# Patient Record
Sex: FEMALE | Race: WHITE | Employment: OTHER
[De-identification: names, ages, dates, MRNs, and addresses within clinical notes are randomized per-mention and may not be internally consistent; named-entity substitution may affect disease eponyms.]

---

## 2017-01-23 ENCOUNTER — OFFICE VISIT (OUTPATIENT)
Dept: FAMILY MEDICINE CLINIC | Facility: CLINIC | Age: 67
End: 2017-01-23

## 2017-01-23 VITALS
HEIGHT: 63 IN | BODY MASS INDEX: 26.4 KG/M2 | WEIGHT: 149 LBS | HEART RATE: 101 BPM | DIASTOLIC BLOOD PRESSURE: 73 MMHG | SYSTOLIC BLOOD PRESSURE: 114 MMHG | RESPIRATION RATE: 16 BRPM | TEMPERATURE: 97.8 F

## 2017-01-23 DIAGNOSIS — N39.0 RECURRENT UTI: ICD-10-CM

## 2017-01-23 DIAGNOSIS — C34.91 MALIGNANT NEOPLASM OF RIGHT LUNG, UNSPECIFIED PART OF LUNG (HCC): ICD-10-CM

## 2017-01-23 DIAGNOSIS — E03.9 HYPOTHYROIDISM, UNSPECIFIED TYPE: ICD-10-CM

## 2017-01-23 DIAGNOSIS — J42 CHRONIC BRONCHITIS, UNSPECIFIED CHRONIC BRONCHITIS TYPE (HCC): Primary | ICD-10-CM

## 2017-01-23 DIAGNOSIS — E78.00 HIGH CHOLESTEROL: ICD-10-CM

## 2017-01-23 PROCEDURE — 99214 OFFICE O/P EST MOD 30 MIN: CPT | Performed by: FAMILY MEDICINE

## 2017-01-23 RX ORDER — LEVOTHYROXINE SODIUM 0.1 MG/1
TABLET ORAL
COMMUNITY
Start: 2016-12-30 | End: 2018-01-30 | Stop reason: SDUPTHER

## 2017-03-07 ENCOUNTER — OFFICE VISIT (OUTPATIENT)
Dept: FAMILY MEDICINE CLINIC | Facility: CLINIC | Age: 67
End: 2017-03-07

## 2017-03-07 VITALS
DIASTOLIC BLOOD PRESSURE: 70 MMHG | WEIGHT: 146 LBS | RESPIRATION RATE: 16 BRPM | HEART RATE: 103 BPM | OXYGEN SATURATION: 98 % | BODY MASS INDEX: 25.86 KG/M2 | SYSTOLIC BLOOD PRESSURE: 96 MMHG | TEMPERATURE: 97.8 F

## 2017-03-07 DIAGNOSIS — J40 BRONCHITIS: Primary | ICD-10-CM

## 2017-03-07 PROCEDURE — 99213 OFFICE O/P EST LOW 20 MIN: CPT | Performed by: NURSE PRACTITIONER

## 2017-03-07 RX ORDER — AZITHROMYCIN 250 MG/1
TABLET, FILM COATED ORAL
COMMUNITY
Start: 2017-03-06 | End: 2017-11-07 | Stop reason: ALTCHOICE

## 2017-03-07 RX ORDER — METHYLPREDNISOLONE 4 MG/1
TABLET ORAL
COMMUNITY
Start: 2017-03-06 | End: 2020-07-27 | Stop reason: ALTCHOICE

## 2017-03-07 ASSESSMENT — ENCOUNTER SYMPTOMS
SINUS PRESSURE: 0
HEMOPTYSIS: 0
COUGH: 1
SHORTNESS OF BREATH: 0

## 2017-03-14 ENCOUNTER — HOSPITAL ENCOUNTER (OUTPATIENT)
Age: 67
Discharge: HOME | End: 2017-03-14
Payer: COMMERCIAL

## 2017-03-14 ENCOUNTER — OFFICE VISIT (OUTPATIENT)
Dept: FAMILY MEDICINE CLINIC | Age: 67
End: 2017-03-14
Payer: COMMERCIAL

## 2017-03-14 ENCOUNTER — HOSPITAL ENCOUNTER (OUTPATIENT)
Dept: GENERAL RADIOLOGY | Age: 67
Discharge: HOME | End: 2017-03-14
Payer: COMMERCIAL

## 2017-03-14 VITALS
SYSTOLIC BLOOD PRESSURE: 132 MMHG | RESPIRATION RATE: 14 BRPM | HEIGHT: 63 IN | TEMPERATURE: 98.2 F | WEIGHT: 145.94 LBS | BODY MASS INDEX: 25.86 KG/M2 | DIASTOLIC BLOOD PRESSURE: 85 MMHG | HEART RATE: 86 BPM

## 2017-03-14 DIAGNOSIS — J42 CHRONIC BRONCHITIS, UNSPECIFIED CHRONIC BRONCHITIS TYPE (HCC): ICD-10-CM

## 2017-03-14 DIAGNOSIS — R05.9 COUGH: ICD-10-CM

## 2017-03-14 DIAGNOSIS — Z13.820 SCREENING FOR OSTEOPOROSIS: ICD-10-CM

## 2017-03-14 DIAGNOSIS — Z13.220 SCREENING CHOLESTEROL LEVEL: ICD-10-CM

## 2017-03-14 DIAGNOSIS — Z23 NEED FOR STREPTOCOCCUS PNEUMONIAE VACCINATION: ICD-10-CM

## 2017-03-14 DIAGNOSIS — C34.91 MALIGNANT NEOPLASM OF RIGHT LUNG, UNSPECIFIED PART OF LUNG (HCC): ICD-10-CM

## 2017-03-14 DIAGNOSIS — Z12.39 SCREENING FOR BREAST CANCER: ICD-10-CM

## 2017-03-14 DIAGNOSIS — J40 BRONCHITIS: Primary | ICD-10-CM

## 2017-03-14 PROCEDURE — 71020 XR CHEST STANDARD TWO VW: CPT

## 2017-03-14 PROCEDURE — 90471 IMMUNIZATION ADMIN: CPT | Performed by: NURSE PRACTITIONER

## 2017-03-14 PROCEDURE — 90670 PCV13 VACCINE IM: CPT | Performed by: NURSE PRACTITIONER

## 2017-03-14 PROCEDURE — 99213 OFFICE O/P EST LOW 20 MIN: CPT | Performed by: NURSE PRACTITIONER

## 2017-03-14 RX ORDER — BENZONATATE 200 MG/1
200 CAPSULE ORAL 3 TIMES DAILY PRN
Qty: 30 CAPSULE | Refills: 0 | Status: SHIPPED | OUTPATIENT
Start: 2017-03-14 | End: 2017-03-24

## 2017-03-14 ASSESSMENT — ENCOUNTER SYMPTOMS
RHINORRHEA: 0
WHEEZING: 1
VOMITING: 0
SHORTNESS OF BREATH: 1
COUGH: 1
SORE THROAT: 0
SINUS PRESSURE: 0
TROUBLE SWALLOWING: 0
NAUSEA: 0
HEMOPTYSIS: 0

## 2017-03-28 ENCOUNTER — HOSPITAL ENCOUNTER (OUTPATIENT)
Dept: MAMMOGRAPHY | Age: 67
Discharge: HOME | End: 2017-03-28
Payer: COMMERCIAL

## 2017-03-28 ENCOUNTER — HOSPITAL ENCOUNTER (OUTPATIENT)
Dept: BONE DENSITY | Age: 67
Discharge: HOME | End: 2017-03-28
Payer: COMMERCIAL

## 2017-03-28 DIAGNOSIS — Z12.39 SCREENING FOR BREAST CANCER: ICD-10-CM

## 2017-03-28 DIAGNOSIS — Z13.820 SCREENING FOR OSTEOPOROSIS: ICD-10-CM

## 2017-03-28 PROBLEM — M85.80 OSTEOPENIA: Status: ACTIVE | Noted: 2017-03-28

## 2017-03-28 PROCEDURE — 77080 DXA BONE DENSITY AXIAL: CPT

## 2017-03-28 PROCEDURE — G0202 SCR MAMMO BI INCL CAD: HCPCS

## 2017-04-24 ENCOUNTER — OFFICE VISIT (OUTPATIENT)
Dept: PULMONOLOGY | Age: 67
End: 2017-04-24
Payer: COMMERCIAL

## 2017-04-24 VITALS
HEART RATE: 98 BPM | OXYGEN SATURATION: 94 % | DIASTOLIC BLOOD PRESSURE: 62 MMHG | BODY MASS INDEX: 27.23 KG/M2 | SYSTOLIC BLOOD PRESSURE: 110 MMHG | HEIGHT: 62 IN | RESPIRATION RATE: 16 BRPM | TEMPERATURE: 97.2 F | WEIGHT: 148 LBS

## 2017-04-24 DIAGNOSIS — Z90.2 S/P PNEUMONECTOMY: ICD-10-CM

## 2017-04-24 DIAGNOSIS — J44.9 COPD, SEVERITY TO BE DETERMINED (HCC): Primary | ICD-10-CM

## 2017-04-24 PROCEDURE — 99204 OFFICE O/P NEW MOD 45 MIN: CPT | Performed by: INTERNAL MEDICINE

## 2017-05-16 ENCOUNTER — HOSPITAL ENCOUNTER (OUTPATIENT)
Dept: PULMONOLOGY | Age: 67
Discharge: HOME | End: 2017-05-16
Payer: COMMERCIAL

## 2017-05-16 PROCEDURE — 94060 EVALUATION OF WHEEZING: CPT

## 2017-05-16 PROCEDURE — 94727 GAS DIL/WSHOT DETER LNG VOL: CPT

## 2017-05-16 PROCEDURE — 6360000002 HC RX W HCPCS: Performed by: INTERNAL MEDICINE

## 2017-05-16 PROCEDURE — 94726 PLETHYSMOGRAPHY LUNG VOLUMES: CPT

## 2017-05-16 PROCEDURE — 94729 DIFFUSING CAPACITY: CPT

## 2017-05-16 PROCEDURE — 94728 AIRWY RESIST BY OSCILLOMETRY: CPT

## 2017-05-16 RX ORDER — ALBUTEROL SULFATE 2.5 MG/3ML
2.5 SOLUTION RESPIRATORY (INHALATION) ONCE
Status: COMPLETED | OUTPATIENT
Start: 2017-05-16 | End: 2017-05-16

## 2017-05-16 RX ADMIN — ALBUTEROL SULFATE 2.5 MG: 2.5 SOLUTION RESPIRATORY (INHALATION) at 15:56

## 2017-11-07 ENCOUNTER — OFFICE VISIT (OUTPATIENT)
Dept: FAMILY MEDICINE CLINIC | Age: 67
End: 2017-11-07
Payer: COMMERCIAL

## 2017-11-07 VITALS
TEMPERATURE: 98.1 F | DIASTOLIC BLOOD PRESSURE: 70 MMHG | WEIGHT: 148 LBS | HEART RATE: 98 BPM | BODY MASS INDEX: 27.23 KG/M2 | RESPIRATION RATE: 16 BRPM | HEIGHT: 62 IN | SYSTOLIC BLOOD PRESSURE: 120 MMHG

## 2017-11-07 DIAGNOSIS — J42 CHRONIC BRONCHITIS, UNSPECIFIED CHRONIC BRONCHITIS TYPE (HCC): ICD-10-CM

## 2017-11-07 DIAGNOSIS — J02.9 SORE THROAT: ICD-10-CM

## 2017-11-07 DIAGNOSIS — J40 BRONCHITIS: Primary | ICD-10-CM

## 2017-11-07 PROCEDURE — 3017F COLORECTAL CA SCREEN DOC REV: CPT | Performed by: NURSE PRACTITIONER

## 2017-11-07 PROCEDURE — 1090F PRES/ABSN URINE INCON ASSESS: CPT | Performed by: NURSE PRACTITIONER

## 2017-11-07 PROCEDURE — 87880 STREP A ASSAY W/OPTIC: CPT | Performed by: NURSE PRACTITIONER

## 2017-11-07 PROCEDURE — 4040F PNEUMOC VAC/ADMIN/RCVD: CPT | Performed by: NURSE PRACTITIONER

## 2017-11-07 PROCEDURE — 1036F TOBACCO NON-USER: CPT | Performed by: NURSE PRACTITIONER

## 2017-11-07 PROCEDURE — G8399 PT W/DXA RESULTS DOCUMENT: HCPCS | Performed by: NURSE PRACTITIONER

## 2017-11-07 PROCEDURE — G8482 FLU IMMUNIZE ORDER/ADMIN: HCPCS | Performed by: NURSE PRACTITIONER

## 2017-11-07 PROCEDURE — 99213 OFFICE O/P EST LOW 20 MIN: CPT | Performed by: NURSE PRACTITIONER

## 2017-11-07 PROCEDURE — G8427 DOCREV CUR MEDS BY ELIG CLIN: HCPCS | Performed by: NURSE PRACTITIONER

## 2017-11-07 PROCEDURE — G8926 SPIRO NO PERF OR DOC: HCPCS | Performed by: NURSE PRACTITIONER

## 2017-11-07 PROCEDURE — 1123F ACP DISCUSS/DSCN MKR DOCD: CPT | Performed by: NURSE PRACTITIONER

## 2017-11-07 PROCEDURE — G8419 CALC BMI OUT NRM PARAM NOF/U: HCPCS | Performed by: NURSE PRACTITIONER

## 2017-11-07 PROCEDURE — 3023F SPIROM DOC REV: CPT | Performed by: NURSE PRACTITIONER

## 2017-11-07 PROCEDURE — 3014F SCREEN MAMMO DOC REV: CPT | Performed by: NURSE PRACTITIONER

## 2017-11-07 RX ORDER — AZITHROMYCIN 250 MG/1
TABLET, FILM COATED ORAL
Qty: 6 TABLET | Refills: 0 | Status: SHIPPED | OUTPATIENT
Start: 2017-11-07 | End: 2017-11-17

## 2017-11-07 ASSESSMENT — ENCOUNTER SYMPTOMS
ABDOMINAL PAIN: 0
SHORTNESS OF BREATH: 1
WHEEZING: 1
SINUS PRESSURE: 0
HEMOPTYSIS: 0
SINUS PAIN: 0
RHINORRHEA: 1
VOMITING: 0
NAUSEA: 0
SORE THROAT: 1
COUGH: 1

## 2017-11-07 NOTE — PATIENT INSTRUCTIONS
liquid (oral suspension) on an empty stomach, at least 1 hour before or 2 hours after a meal.  To use the oral suspension single dose packet: Open the packet and pour the medicine into 2 ounces of water. Stir this mixture and drink all of it right away. Do not save for later use. To make sure you get the entire dose, add a little more water to the same glass, swirl gently and drink right away. Throw away any mixed Zmax oral suspension that has not been used within 12 hours. Shake the oral suspension (liquid) well just before you measure a dose. Measure liquid medicine with the dosing syringe provided, or with a special dose-measuring spoon or medicine cup. If you do not have a dose-measuring device, ask your pharmacist for one. Use this medicine for the full prescribed length of time. Your symptoms may improve before the infection is completely cleared. Skipping doses may also increase your risk of further infection that is resistant to antibiotics. Azithromycin will not treat a viral infection such as the flu or a common cold. Store at room temperature away from moisture and heat. Throw away any unused liquid medicine after 10 days. What happens if I miss a dose? Take the missed dose as soon as you remember. Skip the missed dose if it is almost time for your next scheduled dose. Do not  take extra medicine to make up the missed dose. What happens if I overdose? Seek emergency medical attention or call the Poison Help line at 1-661.149.3228. What should I avoid while taking azithromycin? Do not take antacids that contain aluminum or magnesium within 2 hours before or after you take azithromycin. This includes Acid Gone, Aldroxicon, Alternagel, Di-Gel, Gaviscon, Gelusil, Genaton, Maalox, Maldroxal, Milk of Magnesia, Mintox, Mylagen, Mylanta, Pepcid Complete, Rolaids, Rulox, and others. These antacids can make azithromycin less effective when taken at the same time.   Antibiotic medicines can cause diarrhea, effects. If you have questions about the drugs you are taking, check with your doctor, nurse or pharmacist.  Copyright 0965-8652 [facility] [place]. Version: 17.04. Revision date: 7/5/2016. Care instructions adapted under license by [facility] ([facility]). If you have questions about a medical condition or this instruction, always ask your healthcare professional. [address] any warranty or liability for your use of this information.

## 2017-11-07 NOTE — PROGRESS NOTES
[address] [address]  Dept: [phone]  Dept Fax: [phone]    Nette Vilchis is a 78 y.o. female who presents today for her medical conditions/complaints as noted below. Nette Vilchis is c/o of URI and Pharyngitis        HPI:     Cough   This is a new problem. The current episode started in the past 7 days (5 days). The problem has been gradually worsening. The problem occurs every few minutes. The cough is productive of sputum. Associated symptoms include chills, rhinorrhea, a sore throat, shortness of breath (intermittent with exertion) and wheezing. Pertinent negatives include no chest pain, ear pain, fever, hemoptysis, nasal congestion, postnasal drip, rash or weight loss. The symptoms are aggravated by exercise. Risk factors for lung disease include smoking/tobacco exposure (former smoker). She has tried a beta-agonist inhaler for the symptoms. The treatment provided moderate relief. Her past medical history is significant for bronchitis and COPD. Lung CA       Past Medical History:   Diagnosis Date    Asthma     COPD (chronic obstructive pulmonary disease) ([facility] [address]     Hypothyroid     Seizures (HCC)       Past Surgical History:   Procedure Laterality Date    BREAST CYST EXCISION Bilateral     fibiritic    CHOLECYSTECTOMY      LUNG REMOVAL, TOTAL Right     OVARIAN CYST REMOVAL Bilateral     TONSILLECTOMY      TUBAL LIGATION         History reviewed. No pertinent family history. Social History   Substance Use Topics    Smoking status: Former Smoker     Packs/day: 0.50     Years: 23.00     Types: Cigarettes     Quit date: 10/7/1995    Smokeless tobacco: Never Used    Alcohol use No      Current Outpatient Prescriptions   Medication Sig Dispense Refill    azithromycin (ZITHROMAX) 250 MG tablet 2 tablets by mouth on day one.   Then, 1 tablet by mouth daily for days 2-5. 6 tablet 0    methylPREDNISolone (MEDROL DOSEPACK) 4 MG tablet       levothyroxine (SYNTHROID) 100 MCG tablet       SPIRIVA HANDIHALER 18 MCG inhalation capsule INHALE ONE CAPSULE BY MOUTH DAILY 30 capsule 3    albuterol sulfate HFA (PROAIR HFA) 108 (90 BASE) MCG/ACT inhaler Inhale 2 puffs into the lungs every 6 hours as needed for Wheezing 1 Inhaler 3    Cranberry 125 MG TABS Take by mouth      albuterol (PROVENTIL) (2.5 MG/3ML) 0.083% nebulizer solution Take 3 mLs by nebulization every 6 hours as needed for Wheezing. 120 each 3     Current Facility-Administered Medications   Medication Dose Route Frequency Provider Last Rate Last Dose    albuterol (ACCUNEB) nebulizer solution 0.63 mg  0.63 mg Nebulization Once Leonor Dawn MD        methylPREDNISolone acetate (DEPO-MEDROL) injection 80 mg  80 mg Intramuscular Once Leonor Dawn MD        methylPREDNISolone acetate (DEPO-MEDROL) injection 40 mg  40 mg Intramuscular Once Leonor Dawn MD         Allergies   Allergen Reactions    Benadryl [Diphenhydramine]     Codeine     Dye [Iodides]     Lorazepam     Prochlorperazine Edisylate     Sulfa Antibiotics     Zofran [Ondansetron Hcl] Nausea And Vomiting       Health Maintenance   Topic Date Due    Hepatitis C screen  1950    DTaP/Tdap/Td vaccine (1 - Tdap) 05/16/1970    Colon cancer screen colonoscopy  05/16/2001    Zostavax vaccine  05/16/2011    Diabetes screen  01/24/2015    Lipid screen  01/24/2017    Pneumococcal low/med risk (2 of 2 - PPSV23) 03/14/2018    Breast cancer screen  03/28/2019    DEXA (modify frequency per FRAX score)  Completed    Flu vaccine  Completed       Subjective:      Review of Systems   Constitutional: Positive for chills. Negative for fatigue, fever, unexpected weight change and weight loss. HENT: Positive for rhinorrhea and sore throat. Negative for congestion, ear pain, postnasal drip, sinus pain and sinus pressure.     Respiratory: Positive for cough, shortness of breath (intermittent with exertion) and wheezing. Negative for hemoptysis. Cardiovascular: Negative for chest pain, palpitations and leg swelling. Gastrointestinal: Negative for abdominal pain, nausea and vomiting. Musculoskeletal: Negative for neck stiffness. Skin: Negative for rash. Neurological: Negative for dizziness and syncope. Objective:     Physical Exam   Constitutional: She is oriented to person, place, and time. She appears well-developed and well-nourished. No distress. HENT:   Head: Normocephalic and atraumatic. Right Ear: Hearing, tympanic membrane, external ear and ear canal normal.   Left Ear: Hearing, tympanic membrane, external ear and ear canal normal.   Nose: Mucosal edema present. Right sinus exhibits no maxillary sinus tenderness and no frontal sinus tenderness. Left sinus exhibits no maxillary sinus tenderness and no frontal sinus tenderness. Mouth/Throat: Uvula is midline and mucous membranes are normal. No trismus in the jaw. Posterior oropharyngeal erythema (mild) present. No oropharyngeal exudate or posterior oropharyngeal edema. Eyes: EOM are normal. Pupils are equal, round, and reactive to light. Neck: Normal range of motion. Neck supple. Cardiovascular: Normal rate, regular rhythm and normal heart sounds. Exam reveals no gallop and no friction rub. No murmur heard. Pulmonary/Chest: Effort normal and breath sounds normal. No respiratory distress. She has no wheezes. She has no rales. Abdominal: There is no CVA tenderness. Musculoskeletal: Normal range of motion. Lymphadenopathy:     She has no cervical adenopathy. Neurological: She is alert and oriented to person, place, and time. No cranial nerve deficit. Skin: Skin is warm and dry. No rash noted. She is not diaphoretic. Psychiatric: She has a normal mood and affect. Her behavior is normal. Judgment and thought content normal.   Nursing note and vitals reviewed.     BP

## 2018-01-30 ENCOUNTER — OFFICE VISIT (OUTPATIENT)
Dept: FAMILY MEDICINE CLINIC | Age: 68
End: 2018-01-30
Payer: COMMERCIAL

## 2018-01-30 ENCOUNTER — HOSPITAL ENCOUNTER (OUTPATIENT)
Age: 68
Discharge: HOME | End: 2018-01-30
Payer: MEDICARE

## 2018-01-30 ENCOUNTER — HOSPITAL ENCOUNTER (OUTPATIENT)
Dept: GENERAL RADIOLOGY | Age: 68
Discharge: HOME | End: 2018-02-01
Payer: MEDICARE

## 2018-01-30 ENCOUNTER — HOSPITAL ENCOUNTER (OUTPATIENT)
Age: 68
Discharge: HOME | End: 2018-02-01
Payer: MEDICARE

## 2018-01-30 ENCOUNTER — TELEPHONE (OUTPATIENT)
Dept: FAMILY MEDICINE CLINIC | Age: 68
End: 2018-01-30

## 2018-01-30 VITALS
RESPIRATION RATE: 16 BRPM | TEMPERATURE: 97.6 F | BODY MASS INDEX: 27.56 KG/M2 | DIASTOLIC BLOOD PRESSURE: 71 MMHG | SYSTOLIC BLOOD PRESSURE: 117 MMHG | HEART RATE: 95 BPM | WEIGHT: 150.7 LBS

## 2018-01-30 DIAGNOSIS — J40 BRONCHITIS: ICD-10-CM

## 2018-01-30 DIAGNOSIS — J40 BRONCHITIS: Primary | ICD-10-CM

## 2018-01-30 DIAGNOSIS — R91.1 LUNG NODULE: ICD-10-CM

## 2018-01-30 LAB
HCT VFR BLD CALC: 47.6 % (ref 36.3–47.1)
HEMOGLOBIN: 14.6 G/DL (ref 11.9–15.1)
MCH RBC QN AUTO: 28 PG (ref 25.2–33.5)
MCHC RBC AUTO-ENTMCNC: 30.7 G/DL (ref 28.4–34.8)
MCV RBC AUTO: 91.2 FL (ref 82.6–102.9)
NRBC AUTOMATED: 0 PER 100 WBC
PDW BLD-RTO: 13.2 % (ref 11.8–14.4)
PLATELET # BLD: 245 K/UL (ref 138–453)
PMV BLD AUTO: 12.1 FL (ref 8.1–13.5)
RBC # BLD: 5.22 M/UL (ref 3.95–5.11)
WBC # BLD: 11 K/UL (ref 3.5–11.3)

## 2018-01-30 PROCEDURE — G8419 CALC BMI OUT NRM PARAM NOF/U: HCPCS | Performed by: FAMILY MEDICINE

## 2018-01-30 PROCEDURE — 1036F TOBACCO NON-USER: CPT | Performed by: FAMILY MEDICINE

## 2018-01-30 PROCEDURE — G8427 DOCREV CUR MEDS BY ELIG CLIN: HCPCS | Performed by: FAMILY MEDICINE

## 2018-01-30 PROCEDURE — 4040F PNEUMOC VAC/ADMIN/RCVD: CPT | Performed by: FAMILY MEDICINE

## 2018-01-30 PROCEDURE — 1123F ACP DISCUSS/DSCN MKR DOCD: CPT | Performed by: FAMILY MEDICINE

## 2018-01-30 PROCEDURE — 71046 X-RAY EXAM CHEST 2 VIEWS: CPT

## 2018-01-30 PROCEDURE — 1090F PRES/ABSN URINE INCON ASSESS: CPT | Performed by: FAMILY MEDICINE

## 2018-01-30 PROCEDURE — 3014F SCREEN MAMMO DOC REV: CPT | Performed by: FAMILY MEDICINE

## 2018-01-30 PROCEDURE — G8399 PT W/DXA RESULTS DOCUMENT: HCPCS | Performed by: FAMILY MEDICINE

## 2018-01-30 PROCEDURE — 36415 COLL VENOUS BLD VENIPUNCTURE: CPT

## 2018-01-30 PROCEDURE — G8482 FLU IMMUNIZE ORDER/ADMIN: HCPCS | Performed by: FAMILY MEDICINE

## 2018-01-30 PROCEDURE — 99213 OFFICE O/P EST LOW 20 MIN: CPT | Performed by: FAMILY MEDICINE

## 2018-01-30 PROCEDURE — 3017F COLORECTAL CA SCREEN DOC REV: CPT | Performed by: FAMILY MEDICINE

## 2018-01-30 PROCEDURE — 85027 COMPLETE CBC AUTOMATED: CPT

## 2018-01-30 RX ORDER — LEVOTHYROXINE SODIUM 0.1 MG/1
100 TABLET ORAL DAILY
Qty: 30 TABLET | Refills: 3 | Status: SHIPPED | OUTPATIENT
Start: 2018-01-30 | End: 2019-04-23 | Stop reason: SDUPTHER

## 2018-01-30 NOTE — PROGRESS NOTES
Subjective: Laura is in for a sick call. She was recently seen and treated in urgent care and diagnosed with bronchitis. She is currently on a steroid, Z-Silverio, and Tessalon Perles. She feels somewhat better. She continues to complain of fatigue. She denies any muscle aches or fever or other symptoms suggestive of influenza. Objective:  /71 (Site: Right Arm, Position: Sitting, Cuff Size: Medium Adult)   Pulse 95   Temp 97.6 °F (36.4 °C) (Oral)   Resp 16   Wt 150 lb 11.2 oz (68.4 kg)   Breastfeeding? No   BMI 27.56 kg/m²   Constitutional: She is oriented to person, place, and time. She appears well-developed and well-nourished and in no acute distress. Head: Normocephalic and atraumatic. Right Ear: External ear normal.  TM is normal in appearance. Left Ear: External ear normal.  TM is normal in appearance. Nose: pink, non-edematous mucosa. Sinuses: no sinus tenderness noted  Throat: no erythema, tonsillar hypertrophy or exudate  Neck: Normal range of motion. Neck supple. No tracheal deviation present. Pulmonary/Chest: Effort normal and breath sounds normal.  Diffuse wheezes and scattered crackles noted. No chest retraction. Assessment:  1. Bronchitis  XR CHEST STANDARD (2 VW)    CBC         Plan:  Medications, laboratory testing, imaging, consultation, and follow up as documented in this encounter. She seems to be improving. I recommended continuation of current treatment. Check studies as indicated above. Further evaluation and management will be dependent upon test results.

## 2018-01-30 NOTE — TELEPHONE ENCOUNTER
I called the patient and advised her of her chest x-ray results. There is no evidence of pneumonia. The x-ray is suggestive of bronchitis. The radiologist did comment on a 9 mm nodule and recommended a non-emergent CT scan. The patient does have a allergy to contrast dye. As such, I will order a non-contrasted CT scan of the chest for evaluation of lung nodule. The patient voices understanding and is expecting the order. Please mail the order to the patient's home and close the encounter.

## 2018-02-06 ENCOUNTER — TELEPHONE (OUTPATIENT)
Dept: FAMILY MEDICINE CLINIC | Age: 68
End: 2018-02-06

## 2018-02-08 ENCOUNTER — HOSPITAL ENCOUNTER (OUTPATIENT)
Dept: CT IMAGING | Age: 68
Discharge: HOME | End: 2018-02-10
Payer: MEDICARE

## 2018-02-08 ENCOUNTER — TELEPHONE (OUTPATIENT)
Dept: FAMILY MEDICINE CLINIC | Age: 68
End: 2018-02-08

## 2018-02-08 DIAGNOSIS — R91.1 LUNG NODULE: ICD-10-CM

## 2018-02-08 PROCEDURE — 71250 CT THORAX DX C-: CPT

## 2018-05-22 ENCOUNTER — OFFICE VISIT (OUTPATIENT)
Dept: PRIMARY CARE CLINIC | Age: 68
End: 2018-05-22
Payer: MEDICARE

## 2018-05-22 VITALS
RESPIRATION RATE: 16 BRPM | BODY MASS INDEX: 27.28 KG/M2 | OXYGEN SATURATION: 99 % | DIASTOLIC BLOOD PRESSURE: 78 MMHG | WEIGHT: 149.2 LBS | HEART RATE: 97 BPM | SYSTOLIC BLOOD PRESSURE: 112 MMHG

## 2018-05-22 DIAGNOSIS — M25.561 ACUTE PAIN OF RIGHT KNEE: Primary | ICD-10-CM

## 2018-05-22 PROCEDURE — G8427 DOCREV CUR MEDS BY ELIG CLIN: HCPCS | Performed by: FAMILY MEDICINE

## 2018-05-22 PROCEDURE — 4040F PNEUMOC VAC/ADMIN/RCVD: CPT | Performed by: FAMILY MEDICINE

## 2018-05-22 PROCEDURE — 1090F PRES/ABSN URINE INCON ASSESS: CPT | Performed by: FAMILY MEDICINE

## 2018-05-22 PROCEDURE — 3017F COLORECTAL CA SCREEN DOC REV: CPT | Performed by: FAMILY MEDICINE

## 2018-05-22 PROCEDURE — G8419 CALC BMI OUT NRM PARAM NOF/U: HCPCS | Performed by: FAMILY MEDICINE

## 2018-05-22 PROCEDURE — G8399 PT W/DXA RESULTS DOCUMENT: HCPCS | Performed by: FAMILY MEDICINE

## 2018-05-22 PROCEDURE — 96372 THER/PROPH/DIAG INJ SC/IM: CPT | Performed by: FAMILY MEDICINE

## 2018-05-22 PROCEDURE — 1036F TOBACCO NON-USER: CPT | Performed by: FAMILY MEDICINE

## 2018-05-22 PROCEDURE — 1123F ACP DISCUSS/DSCN MKR DOCD: CPT | Performed by: FAMILY MEDICINE

## 2018-05-22 PROCEDURE — 99213 OFFICE O/P EST LOW 20 MIN: CPT | Performed by: FAMILY MEDICINE

## 2018-05-22 RX ORDER — NAPROXEN 250 MG/1
250 TABLET ORAL 2 TIMES DAILY WITH MEALS
Qty: 60 TABLET | Refills: 1 | Status: SHIPPED | OUTPATIENT
Start: 2018-05-22 | End: 2018-07-09 | Stop reason: SDUPTHER

## 2018-05-22 RX ORDER — KETOROLAC TROMETHAMINE 30 MG/ML
60 INJECTION, SOLUTION INTRAMUSCULAR; INTRAVENOUS ONCE
Status: COMPLETED | OUTPATIENT
Start: 2018-05-22 | End: 2018-05-22

## 2018-05-22 RX ADMIN — KETOROLAC TROMETHAMINE 60 MG: 30 INJECTION, SOLUTION INTRAMUSCULAR; INTRAVENOUS at 11:25

## 2018-05-22 ASSESSMENT — PATIENT HEALTH QUESTIONNAIRE - PHQ9
SUM OF ALL RESPONSES TO PHQ9 QUESTIONS 1 & 2: 0
1. LITTLE INTEREST OR PLEASURE IN DOING THINGS: 0
2. FEELING DOWN, DEPRESSED OR HOPELESS: 0
SUM OF ALL RESPONSES TO PHQ QUESTIONS 1-9: 0

## 2018-05-29 ENCOUNTER — HOSPITAL ENCOUNTER (OUTPATIENT)
Dept: GENERAL RADIOLOGY | Age: 68
Discharge: HOME | End: 2018-05-31
Payer: MEDICARE

## 2018-05-29 ENCOUNTER — HOSPITAL ENCOUNTER (OUTPATIENT)
Age: 68
Discharge: HOME | End: 2018-05-31
Payer: MEDICARE

## 2018-05-29 DIAGNOSIS — M25.561 ACUTE PAIN OF RIGHT KNEE: ICD-10-CM

## 2018-05-29 PROCEDURE — 73562 X-RAY EXAM OF KNEE 3: CPT

## 2018-06-11 ENCOUNTER — TELEPHONE (OUTPATIENT)
Dept: PRIMARY CARE CLINIC | Age: 68
End: 2018-06-11

## 2018-07-09 DIAGNOSIS — M25.561 ACUTE PAIN OF RIGHT KNEE: ICD-10-CM

## 2018-07-09 RX ORDER — NAPROXEN 250 MG/1
250 TABLET ORAL 2 TIMES DAILY WITH MEALS
Qty: 60 TABLET | Refills: 1 | Status: SHIPPED | OUTPATIENT
Start: 2018-07-09 | End: 2019-06-20 | Stop reason: SINTOL

## 2018-07-18 ENCOUNTER — OFFICE VISIT (OUTPATIENT)
Dept: PRIMARY CARE CLINIC | Age: 68
End: 2018-07-18
Payer: MEDICARE

## 2018-07-18 VITALS
RESPIRATION RATE: 18 BRPM | BODY MASS INDEX: 27.36 KG/M2 | DIASTOLIC BLOOD PRESSURE: 74 MMHG | OXYGEN SATURATION: 99 % | WEIGHT: 149.6 LBS | HEART RATE: 67 BPM | SYSTOLIC BLOOD PRESSURE: 122 MMHG

## 2018-07-18 DIAGNOSIS — H66.001 ACUTE SUPPURATIVE OTITIS MEDIA OF RIGHT EAR WITHOUT SPONTANEOUS RUPTURE OF TYMPANIC MEMBRANE, RECURRENCE NOT SPECIFIED: Primary | ICD-10-CM

## 2018-07-18 DIAGNOSIS — R42 DIZZY: ICD-10-CM

## 2018-07-18 LAB
BILIRUBIN, POC: NORMAL
BLOOD URINE, POC: NEGATIVE
CLARITY, POC: NORMAL
COLOR, POC: NORMAL
GLUCOSE URINE, POC: NEGATIVE
KETONES, POC: NORMAL
LEUKOCYTE EST, POC: NORMAL
NITRITE, POC: NEGATIVE
PH, POC: 6
PROTEIN, POC: NORMAL
SPECIFIC GRAVITY, POC: 1.02
UROBILINOGEN, POC: 0.2

## 2018-07-18 PROCEDURE — 3017F COLORECTAL CA SCREEN DOC REV: CPT | Performed by: INTERNAL MEDICINE

## 2018-07-18 PROCEDURE — 4040F PNEUMOC VAC/ADMIN/RCVD: CPT | Performed by: INTERNAL MEDICINE

## 2018-07-18 PROCEDURE — 1036F TOBACCO NON-USER: CPT | Performed by: INTERNAL MEDICINE

## 2018-07-18 PROCEDURE — 81003 URINALYSIS AUTO W/O SCOPE: CPT | Performed by: INTERNAL MEDICINE

## 2018-07-18 PROCEDURE — 1123F ACP DISCUSS/DSCN MKR DOCD: CPT | Performed by: INTERNAL MEDICINE

## 2018-07-18 PROCEDURE — 1101F PT FALLS ASSESS-DOCD LE1/YR: CPT | Performed by: INTERNAL MEDICINE

## 2018-07-18 PROCEDURE — G8399 PT W/DXA RESULTS DOCUMENT: HCPCS | Performed by: INTERNAL MEDICINE

## 2018-07-18 PROCEDURE — G8419 CALC BMI OUT NRM PARAM NOF/U: HCPCS | Performed by: INTERNAL MEDICINE

## 2018-07-18 PROCEDURE — 1090F PRES/ABSN URINE INCON ASSESS: CPT | Performed by: INTERNAL MEDICINE

## 2018-07-18 PROCEDURE — 99214 OFFICE O/P EST MOD 30 MIN: CPT | Performed by: INTERNAL MEDICINE

## 2018-07-18 PROCEDURE — G8427 DOCREV CUR MEDS BY ELIG CLIN: HCPCS | Performed by: INTERNAL MEDICINE

## 2018-07-18 RX ORDER — MECLIZINE HCL 12.5 MG/1
12.5 TABLET ORAL 3 TIMES DAILY PRN
Qty: 30 TABLET | Refills: 2 | Status: SHIPPED | OUTPATIENT
Start: 2018-07-18 | End: 2018-07-28

## 2018-07-18 RX ORDER — AMOXICILLIN 500 MG/1
500 CAPSULE ORAL 2 TIMES DAILY
Qty: 20 CAPSULE | Refills: 0 | Status: SHIPPED | OUTPATIENT
Start: 2018-07-18 | End: 2018-07-28

## 2018-07-18 ASSESSMENT — ENCOUNTER SYMPTOMS
BACK PAIN: 0
COUGH: 1
SINUS PRESSURE: 1
EYE DISCHARGE: 0
TROUBLE SWALLOWING: 0
EYE REDNESS: 0
SORE THROAT: 0
NAUSEA: 0
ABDOMINAL PAIN: 0
SHORTNESS OF BREATH: 0
VOMITING: 0

## 2018-07-19 NOTE — PROGRESS NOTES
[facility]  [address] [place] [address] [address]  Rosalinda Brito [place] [address]  Dept: [phone]  Dept Fax: [phone]    Eleuterio Crouhc is a 80 y.o. female who presents today for her medical conditions/complaints as noted below. Eleuterio Crouch is c/o of   Chief Complaint   Patient presents with    Dizziness     patient states when she woke up today she was dizzy. Patient said that she tried to go home from work but she was not able to. HPI:     HPI    She had Hx of hearing impairment s/p hearing aids c/o dizziness since today morning and right ear plugged sensation associated with URI signs and symptoms - runny nose , sneezing , dry cough , sinus congestion. Denied fever/chills. Denied left ear symptoms. .    Hemoglobin A1C (%)   Date Value   01/24/2012 5.5             ( goal A1C is < 7)   No results found for: LABMICR  LDL Cholesterol (mg/dL)   Date Value   01/24/2012 150 (H)       (goal LDL is <100)   AST (U/L)   Date Value   01/24/2012 23     ALT (U/L)   Date Value   01/24/2012 26     BUN (mg/dL)   Date Value   04/17/2016 18     BP Readings from Last 3 Encounters:   07/18/18 122/74   05/22/18 112/78   01/30/18 117/71          (goal 120/80)    Past Medical History:   Diagnosis Date    Asthma     COPD (chronic obstructive pulmonary disease) (HCC)     Hypothyroid     Seizures (HCC)       Past Surgical History:   Procedure Laterality Date    BREAST CYST EXCISION Bilateral     fibiritic    CHOLECYSTECTOMY      LUNG REMOVAL, TOTAL Right     OVARIAN CYST REMOVAL Bilateral     TONSILLECTOMY      TUBAL LIGATION         History reviewed. No pertinent family history.     Social History   Substance Use Topics    Smoking status: Former Smoker     Packs/day: 0.50     Years: 23.00     Types: Cigarettes     Quit date: 10/7/1995    Smokeless tobacco: Never Used    Alcohol use No      Current Outpatient Prescriptions   Medication Sig Dispense Refill    amoxicillin (AMOXIL) 500 MG capsule Take 1 capsule by mouth 2 times daily for 10 days 20 capsule 0    meclizine (ANTIVERT) 12.5 MG tablet Take 1 tablet by mouth 3 times daily as needed for Dizziness 30 tablet 2    naproxen (NAPROSYN) 250 MG tablet Take 1 tablet by mouth 2 times daily (with meals) 60 tablet 1    levothyroxine (SYNTHROID) 100 MCG tablet Take 1 tablet by mouth daily 30 tablet 3    methylPREDNISolone (MEDROL DOSEPACK) 4 MG tablet       SPIRIVA HANDIHALER 18 MCG inhalation capsule INHALE ONE CAPSULE BY MOUTH DAILY 30 capsule 3    albuterol sulfate HFA (PROAIR HFA) 108 (90 BASE) MCG/ACT inhaler Inhale 2 puffs into the lungs every 6 hours as needed for Wheezing 1 Inhaler 3    Cranberry 125 MG TABS Take by mouth      albuterol (PROVENTIL) (2.5 MG/3ML) 0.083% nebulizer solution Take 3 mLs by nebulization every 6 hours as needed for Wheezing.  120 each 3     Current Facility-Administered Medications   Medication Dose Route Frequency Provider Last Rate Last Dose    albuterol (ACCUNEB) nebulizer solution 0.63 mg  0.63 mg Nebulization Once Rodrigo MD Annika        methylPREDNISolone acetate (DEPO-MEDROL) injection 80 mg  80 mg Intramuscular Once Rodrigo Roblero MD        methylPREDNISolone acetate (DEPO-MEDROL) injection 40 mg  40 mg Intramuscular Once Rodrigo MD Annika         Allergies   Allergen Reactions    Benadryl [Diphenhydramine]     Codeine     Dye [Iodides]     Lorazepam     Prochlorperazine Edisylate     Sulfa Antibiotics     Zofran [Ondansetron Hcl] Nausea And Vomiting       Health Maintenance   Topic Date Due    Hepatitis C screen  1950    DTaP/Tdap/Td vaccine (1 - Tdap) 05/16/1970    Shingles Vaccine (1 of 2 - 2 Dose Series) 05/16/2001    Colon cancer screen colonoscopy  05/16/2001    Diabetes screen  01/24/2015    Lipid screen  01/24/2017    Pneumococcal low/med risk (2 of 2 - PPSV23) 03/14/2018    Flu vaccine (1) 09/01/2018    Breast cancer Lymphadenopathy:     She has no cervical adenopathy. Neurological: She is alert and oriented to person, place, and time. Skin: Skin is warm and dry. No rash noted. She is not diaphoretic. Psychiatric: Judgment and thought content normal.   Nursing note and vitals reviewed. /74 (Site: Right Arm, Position: Sitting, Cuff Size: Medium Adult)   Pulse 67   Resp 18   Wt 149 lb 9.6 oz (67.9 kg)   SpO2 99%   BMI 27.36 kg/m²     Assessment:       Diagnosis Orders   1. Acute suppurative otitis media of right ear without spontaneous rupture of tympanic membrane, recurrence not specified  amoxicillin (AMOXIL) 500 MG capsule   2. Dizzy  POCT Urinalysis No Micro (Auto)    meclizine (ANTIVERT) 12.5 MG tablet             Plan:      Return if symptoms worsen or fail to improve. Orders Placed This Encounter   Procedures    POCT Urinalysis No Micro (Auto)     Orders Placed This Encounter   Medications    amoxicillin (AMOXIL) 500 MG capsule     Sig: Take 1 capsule by mouth 2 times daily for 10 days     Dispense:  20 capsule     Refill:  0    meclizine (ANTIVERT) 12.5 MG tablet     Sig: Take 1 tablet by mouth 3 times daily as needed for Dizziness     Dispense:  30 tablet     Refill:  2       Patient given educational materials - see patient instructions. Discussed use, benefit, and side effects of prescribed medications. All patient questions answered. Pt voiced understanding. Reviewed health maintenance. Instructed to continue current medications, diet and exercise. Patient agreed with treatment plan. Follow up as directed.      Electronically signed by Jaleel Hughes MD on 7/18/2018 at 9:06 PM

## 2018-07-24 ENCOUNTER — HOSPITAL ENCOUNTER (OUTPATIENT)
Age: 68
Setting detail: SPECIMEN
Discharge: HOME | End: 2018-07-24
Payer: MEDICARE

## 2018-07-24 ENCOUNTER — OFFICE VISIT (OUTPATIENT)
Dept: PRIMARY CARE CLINIC | Age: 68
End: 2018-07-24
Payer: MEDICARE

## 2018-07-24 VITALS
BODY MASS INDEX: 27.39 KG/M2 | SYSTOLIC BLOOD PRESSURE: 112 MMHG | DIASTOLIC BLOOD PRESSURE: 68 MMHG | HEART RATE: 91 BPM | OXYGEN SATURATION: 93 % | RESPIRATION RATE: 18 BRPM | WEIGHT: 149.8 LBS

## 2018-07-24 DIAGNOSIS — R35.0 URINARY FREQUENCY: ICD-10-CM

## 2018-07-24 DIAGNOSIS — N34.2 URETHRITIS: Primary | ICD-10-CM

## 2018-07-24 DIAGNOSIS — N34.2 URETHRITIS: ICD-10-CM

## 2018-07-24 LAB
-: NORMAL
AMORPHOUS: NORMAL
BACTERIA: NORMAL
BILIRUBIN URINE: NEGATIVE
BILIRUBIN, POC: NEGATIVE
BLOOD URINE, POC: NEGATIVE
CASTS UA: NORMAL /LPF (ref 0–8)
CLARITY, POC: ABNORMAL
COLOR, POC: YELLOW
COLOR: YELLOW
COMMENT UA: ABNORMAL
CRYSTALS, UA: NORMAL /HPF
EPITHELIAL CELLS UA: NORMAL /HPF (ref 0–5)
GLUCOSE URINE, POC: NEGATIVE
GLUCOSE URINE: NEGATIVE
KETONES, POC: NEGATIVE
KETONES, URINE: NEGATIVE
LEUKOCYTE EST, POC: ABNORMAL
LEUKOCYTE ESTERASE, URINE: ABNORMAL
MUCUS: NORMAL
NITRITE, POC: NEGATIVE
NITRITE, URINE: NEGATIVE
OTHER OBSERVATIONS UA: NORMAL
PH UA: 5.5 (ref 5–8)
PH, POC: 6
PROTEIN UA: NEGATIVE
PROTEIN, POC: ABNORMAL
RBC UA: NORMAL /HPF (ref 0–4)
RENAL EPITHELIAL, UA: NORMAL /HPF
SPECIFIC GRAVITY UA: 1.03 (ref 1–1.03)
SPECIFIC GRAVITY, POC: 1.02
TRICHOMONAS: NORMAL
TURBIDITY: ABNORMAL
URINE HGB: NEGATIVE
UROBILINOGEN, POC: 1
UROBILINOGEN, URINE: NORMAL
WBC UA: NORMAL /HPF (ref 0–5)
YEAST: NORMAL

## 2018-07-24 PROCEDURE — 4040F PNEUMOC VAC/ADMIN/RCVD: CPT | Performed by: INTERNAL MEDICINE

## 2018-07-24 PROCEDURE — 1036F TOBACCO NON-USER: CPT | Performed by: INTERNAL MEDICINE

## 2018-07-24 PROCEDURE — G8419 CALC BMI OUT NRM PARAM NOF/U: HCPCS | Performed by: INTERNAL MEDICINE

## 2018-07-24 PROCEDURE — 99214 OFFICE O/P EST MOD 30 MIN: CPT | Performed by: INTERNAL MEDICINE

## 2018-07-24 PROCEDURE — 81003 URINALYSIS AUTO W/O SCOPE: CPT | Performed by: INTERNAL MEDICINE

## 2018-07-24 PROCEDURE — 3017F COLORECTAL CA SCREEN DOC REV: CPT | Performed by: INTERNAL MEDICINE

## 2018-07-24 PROCEDURE — G8399 PT W/DXA RESULTS DOCUMENT: HCPCS | Performed by: INTERNAL MEDICINE

## 2018-07-24 PROCEDURE — 1090F PRES/ABSN URINE INCON ASSESS: CPT | Performed by: INTERNAL MEDICINE

## 2018-07-24 PROCEDURE — 1101F PT FALLS ASSESS-DOCD LE1/YR: CPT | Performed by: INTERNAL MEDICINE

## 2018-07-24 PROCEDURE — G8427 DOCREV CUR MEDS BY ELIG CLIN: HCPCS | Performed by: INTERNAL MEDICINE

## 2018-07-24 PROCEDURE — 1123F ACP DISCUSS/DSCN MKR DOCD: CPT | Performed by: INTERNAL MEDICINE

## 2018-07-24 RX ORDER — CIPROFLOXACIN 500 MG/1
500 TABLET, FILM COATED ORAL 2 TIMES DAILY
Qty: 10 TABLET | Refills: 0 | Status: SHIPPED | OUTPATIENT
Start: 2018-07-24 | End: 2018-07-29

## 2018-07-25 ASSESSMENT — ENCOUNTER SYMPTOMS
BACK PAIN: 0
COUGH: 0
TROUBLE SWALLOWING: 0
EYE DISCHARGE: 0
SORE THROAT: 0
NAUSEA: 0
EYE REDNESS: 0
SHORTNESS OF BREATH: 0
VOMITING: 0
ABDOMINAL PAIN: 0

## 2018-07-25 NOTE — PROGRESS NOTES
ciprofloxacin (CIPRO) 500 MG tablet Take 1 tablet by mouth 2 times daily for 5 days 10 tablet 0    amoxicillin (AMOXIL) 500 MG capsule Take 1 capsule by mouth 2 times daily for 10 days 20 capsule 0    meclizine (ANTIVERT) 12.5 MG tablet Take 1 tablet by mouth 3 times daily as needed for Dizziness 30 tablet 2    naproxen (NAPROSYN) 250 MG tablet Take 1 tablet by mouth 2 times daily (with meals) 60 tablet 1    levothyroxine (SYNTHROID) 100 MCG tablet Take 1 tablet by mouth daily 30 tablet 3    methylPREDNISolone (MEDROL DOSEPACK) 4 MG tablet       SPIRIVA HANDIHALER 18 MCG inhalation capsule INHALE ONE CAPSULE BY MOUTH DAILY 30 capsule 3    albuterol sulfate HFA (PROAIR HFA) 108 (90 BASE) MCG/ACT inhaler Inhale 2 puffs into the lungs every 6 hours as needed for Wheezing 1 Inhaler 3    Cranberry 125 MG TABS Take by mouth      albuterol (PROVENTIL) (2.5 MG/3ML) 0.083% nebulizer solution Take 3 mLs by nebulization every 6 hours as needed for Wheezing.  120 each 3     Current Facility-Administered Medications   Medication Dose Route Frequency Provider Last Rate Last Dose    albuterol (ACCUNEB) nebulizer solution 0.63 mg  0.63 mg Nebulization Once Petra Becerra MD        methylPREDNISolone acetate (DEPO-MEDROL) injection 80 mg  80 mg Intramuscular Once Petra Becerra MD        methylPREDNISolone acetate (DEPO-MEDROL) injection 40 mg  40 mg Intramuscular Once Petra Becerra MD         Allergies   Allergen Reactions    Benadryl [Diphenhydramine]     Codeine     Dye [Iodides]     Lorazepam     Prochlorperazine Edisylate     Sulfa Antibiotics     Zofran [Ondansetron Hcl] Nausea And Vomiting       Health Maintenance   Topic Date Due    Hepatitis C screen  1950    DTaP/Tdap/Td vaccine (1 - Tdap) 05/16/1970    Shingles Vaccine (1 of 2 - 2 Dose Series) 05/16/2001    Colon cancer screen colonoscopy  05/16/2001    Diabetes screen  01/24/2015    Lipid screen  01/24/2017    Pneumococcal low/med risk (2 of 2 - PPSV23) 03/14/2018    Flu vaccine (1) 09/01/2018    Breast cancer screen  03/28/2019    DEXA (modify frequency per FRAX score)  Completed       Subjective:      Review of Systems   Constitutional: Negative for activity change, appetite change, fatigue, fever and unexpected weight change. HENT: Negative for postnasal drip, sore throat and trouble swallowing. Eyes: Negative for discharge and redness. Respiratory: Negative for cough and shortness of breath. Cardiovascular: Negative for chest pain and palpitations. Gastrointestinal: Negative for abdominal pain, nausea and vomiting. Endocrine: Negative for cold intolerance and heat intolerance. Genitourinary: Positive for dysuria, frequency and urgency. Negative for difficulty urinating. Musculoskeletal: Negative for arthralgias, back pain and myalgias. Skin: Negative for rash and wound. Neurological: Negative for dizziness and headaches. Hematological: Negative for adenopathy. Psychiatric/Behavioral: Negative for behavioral problems. The patient is not nervous/anxious. Objective:     Physical Exam   Constitutional: She is oriented to person, place, and time. She appears well-developed and well-nourished. No distress. HENT:   Head: Normocephalic and atraumatic. Right Ear: External ear normal.   Left Ear: External ear normal.   Nose: Nose normal.   Mouth/Throat: Oropharynx is clear and moist. No oropharyngeal exudate. Eyes: Conjunctivae are normal. Right eye exhibits no discharge. Left eye exhibits no discharge. No scleral icterus. Neck: Neck supple. Cardiovascular: Normal rate, regular rhythm and normal heart sounds. No murmur heard. Pulmonary/Chest: Effort normal and breath sounds normal. No respiratory distress. She has no wheezes. Abdominal: Soft. Bowel sounds are normal. She exhibits no distension. There is tenderness.    No tenderness over flank area   Mild suprapubic tenderness

## 2018-07-26 LAB
CULTURE: ABNORMAL
Lab: ABNORMAL
ORGANISM: ABNORMAL
SPECIMEN DESCRIPTION: ABNORMAL
STATUS: ABNORMAL

## 2018-08-03 DIAGNOSIS — M25.561 ACUTE PAIN OF RIGHT KNEE: ICD-10-CM

## 2018-08-03 DIAGNOSIS — N34.2 URETHRITIS: Primary | ICD-10-CM

## 2018-08-03 RX ORDER — NITROFURANTOIN 25; 75 MG/1; MG/1
100 CAPSULE ORAL 2 TIMES DAILY
Qty: 14 CAPSULE | Refills: 0 | Status: SHIPPED | OUTPATIENT
Start: 2018-08-03 | End: 2018-08-10

## 2018-12-18 ENCOUNTER — OFFICE VISIT (OUTPATIENT)
Dept: PRIMARY CARE CLINIC | Age: 68
End: 2018-12-18
Payer: MEDICARE

## 2018-12-18 VITALS
HEIGHT: 62 IN | RESPIRATION RATE: 18 BRPM | HEART RATE: 82 BPM | DIASTOLIC BLOOD PRESSURE: 64 MMHG | SYSTOLIC BLOOD PRESSURE: 118 MMHG | WEIGHT: 150.2 LBS | TEMPERATURE: 97.7 F | BODY MASS INDEX: 27.64 KG/M2

## 2018-12-18 DIAGNOSIS — M25.512 CHRONIC LEFT SHOULDER PAIN: Primary | ICD-10-CM

## 2018-12-18 DIAGNOSIS — T14.8XXA PULLED MUSCLE: ICD-10-CM

## 2018-12-18 DIAGNOSIS — G62.9 NEUROPATHY: ICD-10-CM

## 2018-12-18 DIAGNOSIS — G89.29 CHRONIC LEFT SHOULDER PAIN: Primary | ICD-10-CM

## 2018-12-18 PROCEDURE — 99214 OFFICE O/P EST MOD 30 MIN: CPT | Performed by: INTERNAL MEDICINE

## 2018-12-18 PROCEDURE — 4040F PNEUMOC VAC/ADMIN/RCVD: CPT | Performed by: INTERNAL MEDICINE

## 2018-12-18 PROCEDURE — G8399 PT W/DXA RESULTS DOCUMENT: HCPCS | Performed by: INTERNAL MEDICINE

## 2018-12-18 PROCEDURE — G8427 DOCREV CUR MEDS BY ELIG CLIN: HCPCS | Performed by: INTERNAL MEDICINE

## 2018-12-18 PROCEDURE — 3017F COLORECTAL CA SCREEN DOC REV: CPT | Performed by: INTERNAL MEDICINE

## 2018-12-18 PROCEDURE — 1101F PT FALLS ASSESS-DOCD LE1/YR: CPT | Performed by: INTERNAL MEDICINE

## 2018-12-18 PROCEDURE — 1036F TOBACCO NON-USER: CPT | Performed by: INTERNAL MEDICINE

## 2018-12-18 PROCEDURE — G8482 FLU IMMUNIZE ORDER/ADMIN: HCPCS | Performed by: INTERNAL MEDICINE

## 2018-12-18 PROCEDURE — G8419 CALC BMI OUT NRM PARAM NOF/U: HCPCS | Performed by: INTERNAL MEDICINE

## 2018-12-18 PROCEDURE — 1090F PRES/ABSN URINE INCON ASSESS: CPT | Performed by: INTERNAL MEDICINE

## 2018-12-18 PROCEDURE — 1123F ACP DISCUSS/DSCN MKR DOCD: CPT | Performed by: INTERNAL MEDICINE

## 2018-12-18 RX ORDER — GABAPENTIN 300 MG/1
300 CAPSULE ORAL 2 TIMES DAILY
Qty: 60 CAPSULE | Refills: 3 | Status: SHIPPED | OUTPATIENT
Start: 2018-12-18 | End: 2020-07-27 | Stop reason: ALTCHOICE

## 2018-12-18 RX ORDER — CYCLOBENZAPRINE HCL 10 MG
10 TABLET ORAL NIGHTLY PRN
Qty: 30 TABLET | Refills: 3 | Status: SHIPPED | OUTPATIENT
Start: 2018-12-18 | End: 2018-12-28

## 2018-12-18 ASSESSMENT — ENCOUNTER SYMPTOMS
COUGH: 0
EYE DISCHARGE: 0
ABDOMINAL PAIN: 0
TROUBLE SWALLOWING: 0
SHORTNESS OF BREATH: 0
NAUSEA: 0
EYE REDNESS: 0
SORE THROAT: 0
VOMITING: 0
BACK PAIN: 0

## 2018-12-18 NOTE — PROGRESS NOTES
Diabetes screen  01/24/2015    Lipid screen  01/24/2017    DTaP/Tdap/Td vaccine (1 - Tdap) 09/18/2019 (Originally 5/16/1970)    Pneumococcal low/med risk (2 of 2 - PPSV23) 09/18/2019 (Originally 3/14/2018)    Shingles Vaccine (1 of 2 - 2 Dose Series) 09/18/2019 (Originally 5/16/2001)    Breast cancer screen  03/28/2019    DEXA (modify frequency per FRAX score)  Completed    Flu vaccine  Completed       Subjective:      Review of Systems   Constitutional: Negative for activity change, appetite change, fatigue, fever and unexpected weight change. HENT: Negative for postnasal drip, sore throat and trouble swallowing. Hearing impairment    Eyes: Negative for discharge and redness. Respiratory: Negative for cough and shortness of breath. Cardiovascular: Negative for chest pain and palpitations. Gastrointestinal: Negative for abdominal pain, nausea and vomiting. Endocrine: Negative for cold intolerance and heat intolerance. Genitourinary: Negative for difficulty urinating and dysuria. Musculoskeletal: Negative for arthralgias, back pain and myalgias. Left shoulder pain since last 3 - 4 wks    Skin: Negative for rash and wound. Neurological: Negative for dizziness and headaches. Hematological: Negative for adenopathy. Psychiatric/Behavioral: Negative for behavioral problems. The patient is not nervous/anxious. Objective:     Physical Exam   Constitutional: She is oriented to person, place, and time. She appears well-developed and well-nourished. No distress. HENT:   Head: Normocephalic and atraumatic. Right Ear: External ear normal.   Left Ear: External ear normal.   Nose: Nose normal.   Mouth/Throat: Oropharynx is clear and moist. No oropharyngeal exudate. Eyes: Conjunctivae are normal. Right eye exhibits no discharge. Left eye exhibits no discharge. No scleral icterus. Neck: Neck supple. Cardiovascular: Normal rate, regular rhythm and normal heart sounds.     No murmur heard. Pulmonary/Chest: Effort normal and breath sounds normal. No respiratory distress. She has no wheezes. Abdominal: Soft. Bowel sounds are normal. She exhibits no distension. There is no tenderness. Musculoskeletal: She exhibits no edema or tenderness. ROM at left shoulder full but with some discomfort    Lymphadenopathy:     She has no cervical adenopathy. Neurological: She is alert and oriented to person, place, and time. Skin: Skin is warm and dry. No rash noted. She is not diaphoretic. Psychiatric: Judgment and thought content normal.   Nursing note and vitals reviewed. /64   Pulse 82   Temp 97.7 °F (36.5 °C) (Tympanic)   Resp 18   Ht 5' 2.01\" (1.575 m)   Wt 150 lb 3.2 oz (68.1 kg)   BMI 27.46 kg/m²     Assessment:      1. Chronic left shoulder pain r/o OA , DD - adhesive capsulitis   - cyclobenzaprine (FLEXERIL) 10 MG tablet; Take 1 tablet by mouth nightly as needed for Muscle spasms  Dispense: 30 tablet; Refill: 3  - XR SHOULDER LEFT (MIN 2 VIEWS); Future  - [facility] - [place]  - Continue Aleve /Motrin po prn OTC   2. Pulled muscle    - cyclobenzaprine (FLEXERIL) 10 MG tablet; Take 1 tablet by mouth nightly as needed for Muscle spasms  Dispense: 30 tablet; Refill: 3    3. Neuropathy    - gabapentin (NEURONTIN) 300 MG capsule; Take 1 capsule by mouth 2 times daily for 31 days. .  Dispense: 60 capsule; Refill: 3           Plan:      Return if symptoms worsen or fail to improve.     F/u PT  Orders Placed This Encounter   Procedures    XR SHOULDER LEFT (MIN 2 VIEWS)     Standing Status:   Future     Standing Expiration Date:   12/18/2019     Order Specific Question:   Reason for exam:     Answer:   check [facility] - [place]     Referral Priority:   Routine     Referral Type:   Eval and Treat     Referral Reason:   Specialty Services Required     Requested Specialty:   Physical Therapy     Number of Visits Requested:   1

## 2018-12-18 NOTE — PATIENT INSTRUCTIONS
Patient Education          cyclobenzaprine  Pronunciation:  dion james SHMUEL za preen  Brand:  Amrix, Comfort Pac with Cyclobenzaprine, Fexmid  What is the most important information I should know about cyclobenzaprine? You should not use cyclobenzaprine if you have a thyroid disorder, heart block, congestive heart failure, a heart rhythm disorder, or you have recently had a heart attack. Do not use cyclobenzaprine if you have taken an MAO inhibitor in the past 14 days, such as isocarboxazid, linezolid, phenelzine, rasagiline, selegiline, or tranylcypromine. What is cyclobenzaprine? Cyclobenzaprine is a muscle relaxant. It works by blocking nerve impulses (or pain sensations) that are sent to your brain. Cyclobenzaprine is used together with rest and physical therapy to treat skeletal muscle conditions such as pain or injury. Cyclobenzaprine may also be used for purposes not listed in this medication guide. What should I discuss with my healthcare provider before taking cyclobenzaprine? Do not use cyclobenzaprine if you have taken an MAO inhibitor in the past 14 days. A dangerous drug interaction could occur. MAO inhibitors include isocarboxazid, linezolid, phenelzine, rasagiline, selegiline, and tranylcypromine. You should not use cyclobenzaprine if you are allergic to it, or if you have:  · a heart rhythm disorder, or you have recently had a heart attack;  · congestive heart failure;  · heart block; or  · a thyroid disorder. To make sure cyclobenzaprine is safe for you, tell your doctor if you have:  · liver disease;  · glaucoma;  · enlarged prostate; or  · problems with urination. Older adults may be more sensitive to the side effects of this medicine. Cyclobenzaprine is not expected to harm an unborn baby. Tell your doctor if you are pregnant or plan to become pregnant during treatment. It is not known whether cyclobenzaprine passes into breast milk or if it could harm a nursing baby.  Tell your doctor

## 2018-12-18 NOTE — PROGRESS NOTES
Visit Information  Pt complains of left shoulder pain for 3 weeks. Have you changed or started any medications since your last visit including any over-the-counter medicines, vitamins, or herbal medicines? no   Have you stopped taking any of your medications? Is so, why? -  no  Are you having any side effects from any of your medications? - no    Have you seen any other physician or provider since your last visit?  no   Have you had any other diagnostic tests since your last visit?  no   Have you been seen in the emergency room and/or had an admission in a hospital since we last saw you?  no   Have you had your routine dental cleaning in the past 6 months?  no     Do you have an active MyChart account? If no, what is the barrier? No:     Patient Care Team:  Mary Overton MD as PCP - General (Family Medicine)    Medical History Review  Past Medical, Family, and Social History reviewed and does contribute to the patient presenting condition    Health Maintenance   Topic Date Due    Hepatitis C screen  1950    DTaP/Tdap/Td vaccine (1 - Tdap) 05/16/1970    Shingles Vaccine (1 of 2 - 2 Dose Series) 05/16/2001    Colon cancer screen colonoscopy  05/16/2001    Lipid screen  01/24/2017    Pneumococcal low/med risk (2 of 2 - PPSV23) 03/14/2018    Flu vaccine (1) 09/01/2018    Breast cancer screen  03/28/2019    DEXA (modify frequency per FRAX score)  Completed     Patient/Caregiver verbalize understanding of medications.  yes

## 2019-04-23 RX ORDER — LEVOTHYROXINE SODIUM 0.1 MG/1
100 TABLET ORAL DAILY
Qty: 30 TABLET | Refills: 3 | Status: SHIPPED | OUTPATIENT
Start: 2019-04-23 | End: 2020-03-17 | Stop reason: SDUPTHER

## 2019-04-23 NOTE — TELEPHONE ENCOUNTER
Last OV 12/18/2019  Health Maintenance   Topic Date Due    Hepatitis C screen  1950    Colon cancer screen colonoscopy  05/16/2001    Diabetes screen  01/24/2015    Lipid screen  01/24/2017    Pneumococcal 65+ years Vaccine (2 of 2 - PPSV23) 03/14/2018    Breast cancer screen  03/28/2019    DTaP/Tdap/Td vaccine (1 - Tdap) 09/18/2019 (Originally 5/16/1970)    Shingles Vaccine (1 of 2) 09/18/2019 (Originally 5/16/2001)    DEXA (modify frequency per FRAX score)  Completed    Flu vaccine  Completed             (applicable per patient's age: Cancer Screenings, Depression Screening, Fall Risk Screening, Immunizations)    Hemoglobin A1C (%)   Date Value   01/24/2012 5.5     LDL Cholesterol (mg/dL)   Date Value   01/24/2012 150 (H)     AST (U/L)   Date Value   01/24/2012 23     ALT (U/L)   Date Value   01/24/2012 26     BUN (mg/dL)   Date Value   04/17/2016 18      (goal A1C is < 7)   (goal LDL is <100) need 30-50% reduction from baseline     BP Readings from Last 3 Encounters:   12/18/18 118/64   07/24/18 112/68   07/18/18 122/74    (goal /80)      All Future Testing planned in [facility]:  Lab Frequency Next Occurrence       Next Visit Date:  No future appointments.          Patient Active Problem List:     Need for prophylactic vaccination and inoculation against influenza     COPD (chronic obstructive pulmonary disease) ([facility] [address]     Cancer of lung ([facility] [address]     S/P lobectomy of lung     High cholesterol     Osteopenia     Lung nodule

## 2019-06-11 ENCOUNTER — HOSPITAL ENCOUNTER (EMERGENCY)
Age: 69
Discharge: HOME | End: 2019-06-11
Attending: EMERGENCY MEDICINE
Payer: MEDICARE

## 2019-06-11 VITALS
DIASTOLIC BLOOD PRESSURE: 62 MMHG | HEART RATE: 97 BPM | RESPIRATION RATE: 16 BRPM | WEIGHT: 146 LBS | SYSTOLIC BLOOD PRESSURE: 131 MMHG | OXYGEN SATURATION: 98 % | TEMPERATURE: 97.9 F | HEIGHT: 62 IN | BODY MASS INDEX: 26.87 KG/M2

## 2019-06-11 DIAGNOSIS — S01.01XA LACERATION OF SCALP, INITIAL ENCOUNTER: Primary | ICD-10-CM

## 2019-06-11 PROCEDURE — 99283 EMERGENCY DEPT VISIT LOW MDM: CPT

## 2019-06-11 ASSESSMENT — PAIN DESCRIPTION - LOCATION: LOCATION: HEAD

## 2019-06-12 ENCOUNTER — OFFICE VISIT (OUTPATIENT)
Dept: PRIMARY CARE CLINIC | Age: 69
End: 2019-06-12
Payer: MEDICARE

## 2019-06-12 VITALS
SYSTOLIC BLOOD PRESSURE: 126 MMHG | RESPIRATION RATE: 16 BRPM | OXYGEN SATURATION: 96 % | WEIGHT: 145.4 LBS | DIASTOLIC BLOOD PRESSURE: 78 MMHG | HEART RATE: 72 BPM | BODY MASS INDEX: 26.59 KG/M2
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
## 2019-06-12 DIAGNOSIS — M79.18 MUSCULOSKELETAL PAIN: ICD-10-CM

## 2019-06-12 DIAGNOSIS — J45.30 MILD PERSISTENT ASTHMA WITHOUT COMPLICATION: ICD-10-CM

## 2019-06-12 DIAGNOSIS — M54.2 ACUTE NECK PAIN: ICD-10-CM

## 2019-06-12 PROCEDURE — G8427 DOCREV CUR MEDS BY ELIG CLIN: HCPCS | Performed by: INTERNAL MEDICINE

## 2019-06-12 PROCEDURE — 1036F TOBACCO NON-USER: CPT | Performed by: INTERNAL MEDICINE

## 2019-06-12 PROCEDURE — 1090F PRES/ABSN URINE INCON ASSESS: CPT | Performed by: INTERNAL MEDICINE

## 2019-06-12 PROCEDURE — 1123F ACP DISCUSS/DSCN MKR DOCD: CPT | Performed by: INTERNAL MEDICINE

## 2019-06-12 PROCEDURE — G8419 CALC BMI OUT NRM PARAM NOF/U: HCPCS | Performed by: INTERNAL MEDICINE

## 2019-06-12 PROCEDURE — 4040F PNEUMOC VAC/ADMIN/RCVD: CPT | Performed by: INTERNAL MEDICINE

## 2019-06-12 PROCEDURE — 99214 OFFICE O/P EST MOD 30 MIN: CPT | Performed by: INTERNAL MEDICINE

## 2019-06-12 PROCEDURE — G8399 PT W/DXA RESULTS DOCUMENT: HCPCS | Performed by: INTERNAL MEDICINE

## 2019-06-12 PROCEDURE — 3017F COLORECTAL CA SCREEN DOC REV: CPT | Performed by: INTERNAL MEDICINE

## 2019-06-12 RX ORDER — ALBUTEROL SULFATE 90 UG/1
2 AEROSOL, METERED RESPIRATORY (INHALATION) EVERY 6 HOURS PRN
Qty: 1 INHALER | Refills: 3 | Status: SHIPPED | OUTPATIENT
Start: 2019-06-12 | End: 2020-07-27 | Stop reason: SDUPTHER

## 2019-06-12 RX ORDER — MONTELUKAST SODIUM 10 MG/1
10 TABLET ORAL NIGHTLY
Qty: 30 TABLET | Refills: 5 | Status: SHIPPED | OUTPATIENT
Start: 2019-06-12 | End: 2020-07-27

## 2019-06-12 ASSESSMENT — ENCOUNTER SYMPTOMS
SHORTNESS OF BREATH: 0
TROUBLE SWALLOWING: 0
NAUSEA: 0
ABDOMINAL PAIN: 0
EYE REDNESS: 0
EYE DISCHARGE: 0
SORE THROAT: 0
COUGH: 0
BACK PAIN: 1
VOMITING: 0

## 2019-06-12 ASSESSMENT — PATIENT HEALTH QUESTIONNAIRE - PHQ9
2. FEELING DOWN, DEPRESSED OR HOPELESS: 0
1. LITTLE INTEREST OR PLEASURE IN DOING THINGS: 0
SUM OF ALL RESPONSES TO PHQ QUESTIONS 1-9: 0
SUM OF ALL RESPONSES TO PHQ QUESTIONS 1-9: 0
SUM OF ALL RESPONSES TO PHQ9 QUESTIONS 1 & 2: 0

## 2019-06-12 NOTE — LETTER
[address]  [place] [facility] [address]  Phone: [phone]  Fax: [phone]    Rashad Polk MD        June 12, 2019     Patient: Vineet Sims   YOB: 1950   Date of Visit: 6/12/2019       To Whom it May Concern:    Zarina Kim was seen in my clinic on 6/12/2019. She need to be off from work for 5 - 7 days 6/12/19 till 6/18/19 to recover from acute medical illness . If you have any questions or concerns, please don't hesitate to call.     Sincerely,         Rashad Polk MD

## 2019-06-12 NOTE — PROGRESS NOTES
[facility]  [address] [place] [address] [address]  [facility] [address]  Dept: [phone]  Dept Fax: [phone]    Sheryle Shore is a 77 y.o. female who presents today for her medical conditions/complaints as noted below. Sheryle Shore is c/o of  Chief Complaint   Patient presents with    Follow-up     Patient fell on 6/10/19         HPI:     HPI   ED visit on 6/11/19 reviewed as below   The patient presents to the emergency department with a laceration to her forehead. She tripped on a sidewalk and fell, striking her forehead it at 2 PM today. She did not suffer loss of consciousness. He scraped her chin but has no dental injury. She denies any neck pain or weakness. She was not intoxicated. She says that it keeps bleeding slots why she came in. She does not take any blood thinners.      Today she c/o generalized musculoskeletal pain  And  neck pain , takes aleve po prn with some relief . She is allergic to codeine . Scalp laceration s/p band aid - healing well. She asks for letter to get excused from work - Provided       Hemoglobin A1C (%)   Date Value   01/24/2012 5.5             ( goal A1C is < 7)   No results found for: LABMICR  LDL Cholesterol (mg/dL)   Date Value   01/24/2012 150 (H)       (goal LDL is <100)   AST (U/L)   Date Value   01/24/2012 23     ALT (U/L)   Date Value   01/24/2012 26     BUN (mg/dL)   Date Value   04/17/2016 18     BP Readings from Last 3 Encounters:   06/12/19 126/78   06/11/19 131/62   12/18/18 118/64          (goal 120/80)    Past Medical History:   Diagnosis Date    Asthma     COPD (chronic obstructive pulmonary disease) (HCC)     ESBL (extended spectrum beta-lactamase) producing bacteria infection 07/24/2018    E.  Coli urine    Hypothyroid     Seizures (HCC)       Past Surgical History:   Procedure Laterality Date    BREAST CYST EXCISION Bilateral     fibiritic    CHOLECYSTECTOMY      LUNG REMOVAL, TOTAL Extended]     Dye [Iodides]     Lorazepam     Prochlorperazine Edisylate     Sulfa Antibiotics     Sulfamethoxazole-Trimethoprim     Zofran [Ondansetron Hcl] Nausea And Vomiting       Health Maintenance   Topic Date Due    Hepatitis C screen  1950    Colon cancer screen colonoscopy  05/16/2001    Diabetes screen  01/24/2015    Lipid screen  01/24/2017    Pneumococcal 65+ years Vaccine (2 of 2 - PPSV23) 03/14/2018    Breast cancer screen  03/28/2019    DTaP/Tdap/Td vaccine (1 - Tdap) 09/18/2019 (Originally 5/16/1970)    Shingles Vaccine (1 of 2) 09/18/2019 (Originally 5/16/2001)    DEXA (modify frequency per FRAX score)  Completed    Flu vaccine  Completed       Subjective:      Review of Systems   Constitutional: Negative for activity change, appetite change, fatigue, fever and unexpected weight change. HENT: Negative for postnasal drip, sore throat and trouble swallowing. Eyes: Negative for discharge and redness. Respiratory: Negative for cough and shortness of breath. Cardiovascular: Negative for chest pain and palpitations. Gastrointestinal: Negative for abdominal pain, nausea and vomiting. Endocrine: Negative for cold intolerance and heat intolerance. Genitourinary: Negative for difficulty urinating and dysuria. Musculoskeletal: Positive for arthralgias, back pain and neck pain. Negative for myalgias. Skin: Negative for rash and wound. Neurological: Negative for dizziness and headaches. Hematological: Negative for adenopathy. Psychiatric/Behavioral: Negative for behavioral problems. The patient is not nervous/anxious. Objective:     Physical Exam   Constitutional: She is oriented to person, place, and time. She appears well-developed and well-nourished. No distress. HENT:   Head: Normocephalic and atraumatic.    Right Ear: External ear normal.   Left Ear: External ear normal.   Nose: Nose normal.   Mouth/Throat: Oropharynx is clear and moist. No oropharyngeal exudate. Eyes: Conjunctivae are normal. Right eye exhibits no discharge. Left eye exhibits no discharge. No scleral icterus. Neck: Neck supple. Cardiovascular: Normal rate, regular rhythm and normal heart sounds. No murmur heard. Pulmonary/Chest: Effort normal and breath sounds normal. No respiratory distress. She has no wheezes. Abdominal: Soft. Bowel sounds are normal. She exhibits no distension. There is no tenderness. Musculoskeletal: She exhibits no edema or tenderness. Lymphadenopathy:     She has no cervical adenopathy. Neurological: She is alert and oriented to person, place, and time. Skin: Skin is warm and dry. No rash noted. She is not diaphoretic. Psychiatric: Judgment and thought content normal.   Nursing note and vitals reviewed. /78   Pulse 72   Resp 16   Wt 145 lb 6.4 oz (66 kg)   SpO2 96%   BMI 26.59 kg/m²     Assessment:      1. Fall, subsequent encounter    2. Musculoskeletal pain  - continue aleve po prn     3. Mild persistent asthma without complication  - albuterol inhaler prn   - montelukast (SINGULAIR) 10 MG tablet; Take 1 tablet by mouth nightly  Dispense: 30 tablet; Refill: 5    4. Acute neck pain  - aleve po prn   - XR CERVICAL SPINE (4-5 VIEWS); Future           Plan:      Return if symptoms worsen or fail to improve.     Orders Placed This Encounter   Procedures    XR CERVICAL SPINE (4-5 VIEWS)     Standing Status:   Future     Standing Expiration Date:   6/12/2020     Order Specific Question:   Reason for exam:     Answer:   neck pain s/p fall     Orders Placed This Encounter   Medications    albuterol sulfate HFA (PROAIR HFA) 108 (90 Base) MCG/ACT inhaler     Sig: Inhale 2 puffs into the lungs every 6 hours as needed for Wheezing     Dispense:  1 Inhaler     Refill:  3    montelukast (SINGULAIR) 10 MG tablet     Sig: Take 1 tablet by mouth nightly     Dispense:  30 tablet     Refill:  5        Patient given educational materials - see patient instructions. Discussed use, benefit, and side effects of prescribed medications. All patient questions answered. Pt voiced understanding. Reviewed healthmaintenance. Instructed to continue current medications, diet and exercise. Patient agreed with treatment plan. Follow up as directed.      Electronically signed by Heather Marcos MD on 6/12/2019 at 1:42 PM

## 2019-06-12 NOTE — ED NOTES
Pt to ER with family, ambulated to room, steady gait. Pt reports she fell and hit head about 1400 today, reports uneven step, denies dizziness, CP or SOB at time of fall, denies LOC, denies blood thinners. Pt rates pain 9/10, denies analgesics PTA.  Laceration noted to left side of forehead, bleeding controlled, pt calm, cooperative, respers even non labored, skin warm pink, no distress, here for eval.      Abhishek Reina RN  06/11/19 3182

## 2019-06-12 NOTE — PROGRESS NOTES
Visit Information    Have you changed or started any medications since your last visit including any over-the-counter medicines, vitamins, or herbal medicines? no   Are you having any side effects from any of your medications? -  no  Have you stopped taking any of your medications? Is so, why? -  no    Have you seen any other physician or provider since your last visit? Yes - Records Obtained  Have you had any other diagnostic tests since your last visit? No  Have you been seen in the emergency room and/or had an admission to a hospital since we last saw you? No  Have you had your routine dental cleaning in the past 6 months? no    Have you activated your K[facility] account? If not, what are your barriers?  No:     Patient Care Team:  Deny Short MD as PCP - General (Family Medicine)  Deny Short MD as PCP - [facility]    Medical History Review  Past Medical, Family, and Social History reviewed and does not contribute to the patient presenting condition    Health Maintenance   Topic Date Due    Hepatitis C screen  1950    Colon cancer screen colonoscopy  05/16/2001    Diabetes screen  01/24/2015    Lipid screen  01/24/2017    Pneumococcal 65+ years Vaccine (2 of 2 - PPSV23) 03/14/2018    Breast cancer screen  03/28/2019    DTaP/Tdap/Td vaccine (1 - Tdap) 09/18/2019 (Originally 5/16/1970)    Shingles Vaccine (1 of 2) 09/18/2019 (Originally 5/16/2001)    DEXA (modify frequency per FRAX score)  Completed    Flu vaccine  Completed

## 2019-06-12 NOTE — ED PROVIDER NOTES
[facility]  eMERGENCY dEPARTMENT eNCOUnter      Pt Name: Naa Ortiz  MRN: [id number]  [place] 1950  Date of evaluation: 6/11/2019      CHIEF COMPLAINT       Chief Complaint   Patient presents with    Head Laceration     fall around 14:00 today    Fall         HISTORY OF PRESENT ILLNESS      The patient presents to the emergency department with a laceration to her forehead. She tripped on a sidewalk and fell, striking her forehead it at 2 PM today. She did not suffer loss of consciousness. He scraped her chin but has no dental injury. She denies any neck pain or weakness. She was not intoxicated. She says that it keeps bleeding slots why she came in. She does not take any blood thinners. REVIEW OF SYSTEMS       All systems reviewed and negative unless noted in HPI. The patient denies fever or constitutional symptoms. Denies vision change. Trauma to forehead and chin. Denies any sore throat or rhinorrhea. Denies any neck pain or stiffness. Denies chest pain or shortness of breath. No nausea,  vomiting or diarrhea. Denies any dysuria. Denies urinary frequency or hematuria. Denies musculoskeletal injury other than to head. Denies any weakness, numbness or focal neurologic deficit. Denies any skin rash or edema. No recent psychiatric issues. No easy bruising or bleeding. Denies any polyuria, polydypsia or history of immunocompromise. PAST MEDICAL HISTORY    has a past medical history of Asthma, COPD (chronic obstructive pulmonary disease) ([facility] [address] ESBL (extended spectrum beta-lactamase) producing bacteria infection, Hypothyroid, and Seizures ([facility] [address] SURGICAL HISTORY      has a past surgical history that includes Cholecystectomy; Tonsillectomy; Tubal ligation; Breast cyst excision (Bilateral); ovarian cyst removal (Bilateral); and Lung removal, total (Right).     CURRENT MEDICATIONS       Previous Medications    ALBUTEROL (PROVENTIL) (2.5 MG/3ML) 0.083% NEBULIZER SOLUTION    Take 3 mLs by nebulization every 6 hours as needed for Wheezing. ALBUTEROL SULFATE HFA (PROAIR HFA) 108 (90 BASE) MCG/ACT INHALER    Inhale 2 puffs into the lungs every 6 hours as needed for Wheezing    CRANBERRY 125 MG TABS    Take by mouth    GABAPENTIN (NEURONTIN) 300 MG CAPSULE    Take 1 capsule by mouth 2 times daily for 31 days. Silver Forester LEVOTHYROXINE (SYNTHROID) 100 MCG TABLET    Take 1 tablet by mouth daily    METHYLPREDNISOLONE (MEDROL DOSEPACK) 4 MG TABLET        NAPROXEN (NAPROSYN) 250 MG TABLET    Take 1 tablet by mouth 2 times daily (with meals)    SPIRIVA HANDIHALER 18 MCG INHALATION CAPSULE    INHALE ONE CAPSULE BY MOUTH DAILY       ALLERGIES     is allergic to benadryl [diphenhydramine]; codeine; dilantin  [phenytoin sodium extended]; dye [iodides]; lorazepam; prochlorperazine edisylate; sulfa antibiotics; sulfamethoxazole-trimethoprim; and zofran [ondansetron hcl]. FAMILY HISTORY     has no family status information on file. family history is not on file. SOCIAL HISTORY      reports that she quit smoking about 23 years ago. Her smoking use included cigarettes. She has a 11.50 pack-year smoking history. She has never used smokeless tobacco. She reports that she does not drink alcohol or use drugs. PHYSICAL EXAM     INITIAL VITALS:  height is 5' 2\" (1.575 m) and weight is 66.2 kg (146 lb). Her oral temperature is 97.9 °F (36.6 °C). Her blood pressure is 131/62 and her pulse is 97. Her respiration is 16 and oxygen saturation is 98%. Patient is alert and oriented, in no apparent distress. HEENT:  2.1 cm horizontal lac to forehead. Reading is controlled. No deep structures were involved. Small underlying hematoma noted. Pupils are PERRL at 4 mm with normal extraocular motion. No crepitance around the orbits. No entrapment. Mucous membranes moist.    Neck is supple with no lymphadenopathy. No JVD. No meningismus.   No pain or step-off. Heart sounds regular rate and rhythm with no gallops, murmurs, or rubs. Lungs clear, no wheezes, rales or rhonchi. Abdomen: soft, nontender. Musculoskeletal exam shows no evidence of trauma, other than to forehead and chin. Equal  in all extremities. No extremity injury. Normal distal pulses in all extremities. Skin: no rash or edema. Neurological exam reveals cranial nerves 2 through 12 grossly intact. Patient has equal  and normal deep tendon reflexes. Psychiatric: no hallucinations or suicidal ideation. Lymphatics.:  No lymphadenopathy. DIFFERENTIAL DIAGNOSIS/ MDM:     Laceration, ICH, dental trauma    DIAGNOSTIC RESULTS         EMERGENCY DEPARTMENT COURSE:   Vitals:    Vitals:    06/11/19 2105 06/11/19 2119   BP: (!) 148/71 131/62   Pulse: 104 97   Resp: 16    Temp: 97.9 °F (36.6 °C)    TempSrc: Oral    SpO2: 99% 98%   Weight: 66.2 kg (146 lb)    Height: 5' 2\" (1.575 m)      -------------------------  BP: 131/62, Temp: 97.9 °F (36.6 °C), Pulse: 97, Resp: 16      Re-evaluation Notes    Wound care instructions were provided. The patient was also given head injury instructions. She is discharged in good condition. PROCEDURES:    Laceration repair: The patient's wound was cleansed with soap and water. No deep structures were found to be involved. The wound was closed with wound tissue adhesive. This was reinforced with Steri-Strips. FINAL IMPRESSION      1.  Laceration of scalp, initial encounter          DISPOSITION/PLAN   DISPOSITION Decision To Discharge 06/11/2019 09:15:30 PM      Condition on Disposition    good    PATIENT REFERRED TO:  Erlin Betancourt MD  [place] [address] [address]  [phone]    In 1 week  As needed      DISCHARGE MEDICATIONS:  New Prescriptions    No medications on file       (Please note that portions of this note were completed with a voice recognition program.  Efforts were made to edit the dictations but occasionally words are mis-transcribed.)    Grossman MD   Attending Emergency Physician         Анна Hatch MD  06/11/19 2120       Анна Hatch MD  06/11/19 2126

## 2019-06-13 ENCOUNTER — HOSPITAL ENCOUNTER (OUTPATIENT)
Age: 69
Discharge: HOME | End: 2019-06-15
Payer: MEDICARE

## 2019-06-13 ENCOUNTER — TELEPHONE (OUTPATIENT)
Dept: PRIMARY CARE CLINIC | Age: 69
End: 2019-06-13

## 2019-06-13 ENCOUNTER — HOSPITAL ENCOUNTER (OUTPATIENT)
Dept: GENERAL RADIOLOGY | Age: 69
Discharge: HOME | End: 2019-06-15
Payer: MEDICARE

## 2019-06-13 DIAGNOSIS — M54.2 NECK PAIN: Primary | ICD-10-CM

## 2019-06-13 DIAGNOSIS — M54.2 NECK PAIN: ICD-10-CM

## 2019-06-13 PROCEDURE — 72040 X-RAY EXAM NECK SPINE 2-3 VW: CPT

## 2019-06-13 NOTE — TELEPHONE ENCOUNTER
Artie Harris, from [employer], needs clarification of Xray of Cervical spine. Please call. Patient is there, now.

## 2019-06-13 NOTE — TELEPHONE ENCOUNTER
Called patient to notify her that Dr Nicol Mclaughlin placed and order for an xray and that I was going to mail to patient's home. Left detailed message.

## 2020-03-17 RX ORDER — LEVOTHYROXINE SODIUM 0.1 MG/1
100 TABLET ORAL DAILY
Qty: 30 TABLET | Refills: 1 | Status: SHIPPED | OUTPATIENT
Start: 2020-03-17 | End: 2020-07-27 | Stop reason: SDUPTHER

## 2020-07-27 ENCOUNTER — OFFICE VISIT (OUTPATIENT)
Dept: PRIMARY CARE CLINIC | Age: 70
End: 2020-07-27
Payer: MEDICARE

## 2020-07-27 VITALS
HEIGHT: 62 IN | TEMPERATURE: 98.3 F | SYSTOLIC BLOOD PRESSURE: 94 MMHG | DIASTOLIC BLOOD PRESSURE: 76 MMHG | BODY MASS INDEX: 27.46 KG/M2 | HEART RATE: 90 BPM | OXYGEN SATURATION: 96 % | WEIGHT: 149.2 LBS

## 2020-07-27 PROBLEM — J45.30 MILD PERSISTENT ASTHMA WITHOUT COMPLICATION: Status: ACTIVE | Noted: 2020-07-27

## 2020-07-27 PROCEDURE — G0009 ADMIN PNEUMOCOCCAL VACCINE: HCPCS | Performed by: NURSE PRACTITIONER

## 2020-07-27 PROCEDURE — G0438 PPPS, INITIAL VISIT: HCPCS | Performed by: NURSE PRACTITIONER

## 2020-07-27 PROCEDURE — 90732 PPSV23 VACC 2 YRS+ SUBQ/IM: CPT | Performed by: NURSE PRACTITIONER

## 2020-07-27 RX ORDER — LEVOTHYROXINE SODIUM 0.1 MG/1
100 TABLET ORAL DAILY
Qty: 30 TABLET | Refills: 1 | Status: SHIPPED | OUTPATIENT
Start: 2020-07-27 | End: 2020-10-29 | Stop reason: SDUPTHER

## 2020-07-27 RX ORDER — MELOXICAM 7.5 MG/1
7.5 TABLET ORAL DAILY
Qty: 90 TABLET | Refills: 1 | Status: SHIPPED | OUTPATIENT
Start: 2020-07-27 | End: 2020-08-21 | Stop reason: ALTCHOICE

## 2020-07-27 RX ORDER — ALBUTEROL SULFATE 90 UG/1
2 AEROSOL, METERED RESPIRATORY (INHALATION) EVERY 6 HOURS PRN
Qty: 1 INHALER | Refills: 3 | Status: SHIPPED | OUTPATIENT
Start: 2020-07-27

## 2020-07-27 ASSESSMENT — ENCOUNTER SYMPTOMS
VOMITING: 0
SHORTNESS OF BREATH: 0
RHINORRHEA: 0
SORE THROAT: 0
COLOR CHANGE: 0
NAUSEA: 0
DIARRHEA: 0
CHEST TIGHTNESS: 0
ABDOMINAL PAIN: 0

## 2020-07-27 ASSESSMENT — PATIENT HEALTH QUESTIONNAIRE - PHQ9
2. FEELING DOWN, DEPRESSED OR HOPELESS: 0
SUM OF ALL RESPONSES TO PHQ QUESTIONS 1-9: 0
SUM OF ALL RESPONSES TO PHQ QUESTIONS 1-9: 0
1. LITTLE INTEREST OR PLEASURE IN DOING THINGS: 0
SUM OF ALL RESPONSES TO PHQ9 QUESTIONS 1 & 2: 0

## 2020-07-27 NOTE — PROGRESS NOTES
[address] PRIMARY CARE  Laura Cicha 87   [address]  [address] [address]  Dept: [phone]  Dept Fax: [phone]    Royce Sanchez is a 72 y.o. female who presentstoday for her medical conditions/complaints as noted below. Royce Sanchez is c/o of  Chief Complaint   Patient presents with    Establish Care    Medication Refill     thyroid medicine and rescue inhaler    Knee Pain     left knee pain         HPI:     Here to establish care with new provider and for annual exam  PMH significant for asthma and COPD, hx of lung cancer and s/p right lung removal- using albuteral inhaler and nebulizer PRN, not using spiriva inhaler due to cost, notes that she does not use albuterol every day, uses more in summer when pollen exposure is high, no longer following with pulmonology due to personal preference, notes that symptoms are stable, hypothyroid well controlled with synthroid 100mcg daily  Has complaint of intermittent aching right knee pain, worse with activity, thinks she has arthritis 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 The incident occurred at home. There was no injury mechanism. The pain is present in the right knee. The quality of the pain is described as aching. The pain is at a severity of 5/10. The pain is moderate. The pain has been intermittent since onset. Pertinent negatives include no inability to bear weight, loss of sensation, numbness or tingling. She reports no foreign bodies present. The symptoms are aggravated by movement and weight bearing. She has tried rest and NSAIDs for the symptoms.  The treatment provided mild relief. Hemoglobin A1C (%)   Date Value   2012 5.5             ( goal A1C is < 7)   No results found for: LABMICR  LDL Cholesterol (mg/dL)   Date Value   2012 150 (H)       (goal LDL is <100)   AST (U/L)   Date Value   2012 23     ALT (U/L)   Date Value   2012 26     BUN (mg/dL)   Date Value   2016 18     BP Readings from Last 3 Encounters:   20 94/76   19 126/78   19 131/62          ([id number])    Past Medical History:   Diagnosis Date    Asthma     COPD (chronic obstructive pulmonary disease) (HCC)     ESBL (extended spectrum beta-lactamase) producing bacteria infection 2018    E. Coli urine    Hypothyroid     Seizures (HCC)       Past Surgical History:   Procedure Laterality Date    BREAST CYST EXCISION Bilateral     fibiritic    CHOLECYSTECTOMY      LUNG REMOVAL, TOTAL Right     OVARIAN CYST REMOVAL Bilateral     TONSILLECTOMY      TUBAL LIGATION         No family history on file. Social History     Tobacco Use    Smoking status: Former Smoker     Packs/day: 0.50     Years: 23.00     Pack years: 11.50     Types: Cigarettes     Last attempt to quit: 10/7/1995     Years since quittin.8    Smokeless tobacco: Never Used   Substance Use Topics    Alcohol use: No     Alcohol/week: 0.0 standard drinks      Current Outpatient Medications   Medication Sig Dispense Refill    levothyroxine (SYNTHROID) 100 MCG tablet Take 1 tablet by mouth daily 30 tablet 1    meloxicam (MOBIC) 7.5 MG tablet Take 1 tablet by mouth daily 90 tablet 1    albuterol sulfate HFA (PROAIR HFA) 108 (90 Base) MCG/ACT inhaler Inhale 2 puffs into the lungs every 6 hours as needed for Wheezing 1 Inhaler 3    albuterol (PROVENTIL) (2.5 MG/3ML) 0.083% nebulizer solution Take 3 mLs by nebulization every 6 hours as needed for Wheezing.  120 each 3    SPIRIVA HANDIHALER 18 MCG inhalation capsule INHALE ONE CAPSULE BY MOUTH DAILY (Patient not taking: Reported on 2020) 30 capsule 3     Current Facility-Administered Medications   Medication Dose Route Frequency Provider Last Rate Last Dose    albuterol (ACCUNEB) nebulizer solution 0.63 mg  0.63 mg Nebulization Once Sergio Colón MD        methylPREDNISolone acetate (DEPO-MEDROL) injection 80 mg  80 mg Intramuscular Once Sergio Colón MD        methylPREDNISolone acetate (DEPO-MEDROL) injection 40 mg  40 mg Intramuscular Once Sergio Colón MD         Allergies   Allergen Reactions    Benadryl [Diphenhydramine]     Codeine     Dilantin  [Phenytoin Sodium Extended]     Dye [Iodides]     Lorazepam     Prochlorperazine Edisylate     Sulfa Antibiotics     Sulfamethoxazole-Trimethoprim     Zofran [Ondansetron Hcl] Nausea And Vomiting       Health Maintenance   Topic Date Due    Hepatitis C screen  1950    Shingles Vaccine (1 of 2) 05/16/2001    Colon cancer screen colonoscopy  05/16/2001    Diabetes screen  01/24/2015    Lipid screen  01/24/2017    Breast cancer screen  03/28/2019    Annual Wellness Visit (AWV)  06/20/2019    Flu vaccine (1) 09/01/2020    DTaP/Tdap/Td vaccine (2 - Td) 03/25/2027    DEXA (modify frequency per FRAX score)  Completed    Pneumococcal 65+ years Vaccine  Completed    Hepatitis A vaccine  Aged Out    Hepatitis B vaccine  Aged Out    Hib vaccine  Aged Out    Meningococcal (ACWY) vaccine  Aged Out       Subjective:      Review of Systems   Constitutional: Negative for activity change, fatigue and fever. HENT: Negative for congestion, rhinorrhea and sore throat. Eyes: Negative for visual disturbance. Respiratory: Negative for chest tightness and shortness of breath. Cardiovascular: Negative for chest pain and palpitations. Gastrointestinal: Negative for abdominal pain, diarrhea, nausea and vomiting. Endocrine: Negative for polydipsia. Genitourinary: Negative for difficulty urinating. Musculoskeletal: Positive for arthralgias.  Negative for myalgias. Skin: Negative for color change. Neurological: Negative for tingling, weakness, numbness and headaches. Psychiatric/Behavioral: Negative for behavioral problems. The patient is not nervous/anxious. All other systems reviewed and are negative. Objective:   BP 94/76 (Site: Left Upper Arm, Position: Sitting)   Pulse 90   Temp 98.3 °F (36.8 °C)   Ht 5' 2\" (1.575 m)   Wt 149 lb 3.2 oz (67.7 kg)   SpO2 96%   BMI 27.29 kg/m²   Physical Exam  Vitals signs reviewed. Constitutional:       General: She is not in acute distress. Appearance: Normal appearance. HENT:      Head: Normocephalic. Eyes:      Pupils: Pupils are equal, round, and reactive to light. Cardiovascular:      Rate and Rhythm: Normal rate and regular rhythm. Pulses: Normal pulses. Heart sounds: Normal heart sounds. Pulmonary:      Effort: Pulmonary effort is normal.      Breath sounds: Normal breath sounds. Comments: Absent on right, s/p pneumonectomy  Abdominal:      General: There is no distension. Skin:     General: Skin is warm and dry. Capillary Refill: Capillary refill takes less than 2 seconds. Neurological:      Mental Status: She is alert and oriented to person, place, and time. Psychiatric:         Mood and Affect: Mood normal.         Behavior: Behavior normal.           :       Diagnosis Orders   1. Annual physical exam     2. Hypothyroidism, unspecified type  TSH with Reflex   3. Malignant neoplasm of right lung, unspecified part of lung (HCC)  albuterol sulfate HFA (PROAIR HFA) 108 (90 Base) MCG/ACT inhaler   4. Chronic bronchitis, unspecified chronic bronchitis type (HCC)  albuterol sulfate HFA (PROAIR HFA) 108 (90 Base) MCG/ACT inhaler   5. Encounter for screening mammogram for malignant neoplasm of breast  [facility] DIGITAL SCREEN W OR WO CAD BILATERAL   6. Mild persistent asthma without complication  albuterol sulfate HFA (PROAIR HFA) 108 (90 Base) MCG/ACT inhaler   7.  Arthritis of right knee  meloxicam (MOBIC) 7.5 MG tablet   8. Screening for colon cancer  Cologuard   9. Screening, anemia, deficiency, iron  CBC Auto Differential   10. Screening for diabetes mellitus  Comprehensive Metabolic Panel   11. Screening for cardiovascular condition  Lipid Panel   12. Need for hepatitis C screening test  Hepatitis C Antibody   13. Need for pneumococcal vaccination  Pneumococcal polysaccharide vaccine 23-valent greater than or equal to 1yo subcutaneous/IM   14. Osteopenia of multiple sites               :          1. Annual physical exam  HM discussed, updated Tdap and [id number]. Mammogram, cologuard and screening labs ordered. 2. Hypothyroidism, unspecified type  Stable, continue current dose of synthroid, will recheck TSH  - TSH with Reflex; Future    3. Malignant neoplasm of right lung, unspecified part of lung ([facility] [address]  S/p right pneumonectomy, stable. - albuterol sulfate HFA (PROAIR HFA) 108 (90 Base) MCG/ACT inhaler; Inhale 2 puffs into the lungs every 6 hours as needed for Wheezing  Dispense: 1 Inhaler; Refill: 3    4. Chronic bronchitis, unspecified chronic bronchitis type (HCC)  Stable, continue albuterol inhaler and nebulizer as needed. - albuterol sulfate HFA (PROAIR HFA) 108 (90 Base) MCG/ACT inhaler; Inhale 2 puffs into the lungs every 6 hours as needed for Wheezing  Dispense: 1 Inhaler; Refill: 3    5. Encounter for screening mammogram for malignant neoplasm of breast  - [facility] DIGITAL SCREEN W OR WO CAD BILATERAL; Future    6. Mild persistent asthma without complication  Stable, continue albuterol nebulizer and inhaler as needed. - albuterol sulfate HFA (PROAIR HFA) 108 (90 Base) MCG/ACT inhaler; Inhale 2 puffs into the lungs every 6 hours as needed for Wheezing  Dispense: 1 Inhaler; Refill: 3    7. Arthritis of right knee  New, declined XR, will trial mobic and reevaluate as needed.   Recommended ace wrap or knee sleeve for comfort and support, otc topical analgesic/muscle rub PRN  - meloxicam (MOBIC) 7.5 MG tablet; Take 1 tablet by mouth daily  Dispense: 90 tablet; Refill: 1    8. Osteopenia  Recommend calcium and vitamin D supplementation, moderate weight bearing acitivty, reviewed DEXA from 2017    Return in about 6 months (around 1/27/2021) for asthma/COPD. Patient given educational materials - see patient instructions. Discussed use, benefit, and side effects of prescribed medications. All patient questions answered. Pt voiced understanding. Reviewed health maintenance. Instructed to continue current medications, diet and exercise. Patient agreed with treatment plan. Follow up as directed.        Electronicallysigned by SORAYA García CNP on 7/27/2020 at 9:49 AM

## 2020-08-20 ENCOUNTER — TELEPHONE (OUTPATIENT)
Dept: PRIMARY CARE CLINIC | Age: 70
End: 2020-08-20

## 2020-08-21 ENCOUNTER — TELEPHONE (OUTPATIENT)
Dept: PRIMARY CARE CLINIC | Age: 70
End: 2020-08-21

## 2020-08-21 RX ORDER — MELOXICAM 15 MG/1
15 TABLET ORAL DAILY PRN
Qty: 30 TABLET | Refills: 0 | Status: SHIPPED | OUTPATIENT
Start: 2020-08-21 | End: 2020-10-29 | Stop reason: SDUPTHER

## 2020-10-14 ENCOUNTER — TELEPHONE (OUTPATIENT)
Dept: PRIMARY CARE CLINIC | Age: 70
End: 2020-10-14

## 2020-10-14 NOTE — TELEPHONE ENCOUNTER
Called and left detailed VM letting patient know she needs to move her 10/26 appt to different day as [facility] does not see patient in office on mondays, only virtuals.

## 2020-10-28 ENCOUNTER — OFFICE VISIT (OUTPATIENT)
Dept: PRIMARY CARE CLINIC | Age: 70
End: 2020-10-28
Payer: MEDICARE

## 2020-10-28 ENCOUNTER — HOSPITAL ENCOUNTER (OUTPATIENT)
Age: 70
Setting detail: SPECIMEN
Discharge: HOME | End: 2020-10-28
Payer: MEDICARE

## 2020-10-28 VITALS
OXYGEN SATURATION: 94 % | DIASTOLIC BLOOD PRESSURE: 68 MMHG | SYSTOLIC BLOOD PRESSURE: 122 MMHG | TEMPERATURE: 97.2 F | BODY MASS INDEX: 27.68 KG/M2 | HEIGHT: 62 IN | HEART RATE: 99 BPM | WEIGHT: 150.4 LBS

## 2020-10-28 LAB
ABSOLUTE EOS #: 0.14 K/UL (ref 0–0.44)
ABSOLUTE IMMATURE GRANULOCYTE: 0.03 K/UL (ref 0–0.3)
ABSOLUTE LYMPH #: 1.43 K/UL (ref 1.1–3.7)
ABSOLUTE MONO #: 0.59 K/UL (ref 0.1–1.2)
ALBUMIN SERPL-MCNC: 4.2 G/DL (ref 3.5–5.2)
ALBUMIN/GLOBULIN RATIO: 1.4 (ref 1–2.5)
ALP BLD-CCNC: 96 U/L (ref 35–104)
ALT SERPL-CCNC: 12 U/L (ref 5–33)
ANION GAP SERPL CALCULATED.3IONS-SCNC: 9 MMOL/L (ref 9–17)
AST SERPL-CCNC: 17 U/L
BASOPHILS # BLD: 1 % (ref 0–2)
BASOPHILS ABSOLUTE: 0.05 K/UL (ref 0–0.2)
BILIRUB SERPL-MCNC: 0.27 MG/DL (ref 0.3–1.2)
BUN BLDV-MCNC: 21 MG/DL (ref 8–23)
BUN/CREAT BLD: ABNORMAL (ref 9–20)
CALCIUM SERPL-MCNC: 9.6 MG/DL (ref 8.6–10.4)
CHLORIDE BLD-SCNC: 105 MMOL/L (ref 98–107)
CHOLESTEROL/HDL RATIO: 3.6
CHOLESTEROL: 214 MG/DL
CO2: 25 MMOL/L (ref 20–31)
CREAT SERPL-MCNC: 0.76 MG/DL (ref 0.5–0.9)
DIFFERENTIAL TYPE: ABNORMAL
EOSINOPHILS RELATIVE PERCENT: 2 % (ref 1–4)
GFR AFRICAN AMERICAN: >60 ML/MIN
GFR NON-AFRICAN AMERICAN: >60 ML/MIN
GFR SERPL CREATININE-BSD FRML MDRD: ABNORMAL ML/MIN/{1.73_M2}
GFR SERPL CREATININE-BSD FRML MDRD: ABNORMAL ML/MIN/{1.73_M2}
GLUCOSE BLD-MCNC: 84 MG/DL (ref 70–99)
HCT VFR BLD CALC: 43.5 % (ref 36.3–47.1)
HDLC SERPL-MCNC: 60 MG/DL
HEMOGLOBIN: 13.7 G/DL (ref 11.9–15.1)
HEPATITIS C ANTIBODY: NONREACTIVE
IMMATURE GRANULOCYTES: 1 %
LDL CHOLESTEROL: 133 MG/DL (ref 0–130)
LYMPHOCYTES # BLD: 25 % (ref 24–43)
MCH RBC QN AUTO: 28.1 PG (ref 25.2–33.5)
MCHC RBC AUTO-ENTMCNC: 31.5 G/DL (ref 28.4–34.8)
MCV RBC AUTO: 89.1 FL (ref 82.6–102.9)
MONOCYTES # BLD: 10 % (ref 3–12)
NRBC AUTOMATED: 0 PER 100 WBC
PDW BLD-RTO: 12.7 % (ref 11.8–14.4)
PLATELET # BLD: 223 K/UL (ref 138–453)
PLATELET ESTIMATE: ABNORMAL
PMV BLD AUTO: 12.8 FL (ref 8.1–13.5)
POTASSIUM SERPL-SCNC: 4.7 MMOL/L (ref 3.7–5.3)
RBC # BLD: 4.88 M/UL (ref 3.95–5.11)
RBC # BLD: ABNORMAL 10*6/UL
SEG NEUTROPHILS: 61 % (ref 36–65)
SEGMENTED NEUTROPHILS ABSOLUTE COUNT: 3.51 K/UL (ref 1.5–8.1)
SODIUM BLD-SCNC: 139 MMOL/L (ref 135–144)
THYROXINE, FREE: 1.28 NG/DL (ref 0.93–1.7)
TOTAL PROTEIN: 7.1 G/DL (ref 6.4–8.3)
TRIGL SERPL-MCNC: 105 MG/DL
TSH SERPL DL<=0.05 MIU/L-ACNC: 9.61 MIU/L (ref 0.3–5)
VLDLC SERPL CALC-MCNC: ABNORMAL MG/DL (ref 1–30)
WBC # BLD: 5.8 K/UL (ref 3.5–11.3)
WBC # BLD: ABNORMAL 10*3/UL

## 2020-10-28 PROCEDURE — G8427 DOCREV CUR MEDS BY ELIG CLIN: HCPCS | Performed by: NURSE PRACTITIONER

## 2020-10-28 PROCEDURE — 4040F PNEUMOC VAC/ADMIN/RCVD: CPT | Performed by: NURSE PRACTITIONER

## 2020-10-28 PROCEDURE — G8926 SPIRO NO PERF OR DOC: HCPCS | Performed by: NURSE PRACTITIONER

## 2020-10-28 PROCEDURE — 1036F TOBACCO NON-USER: CPT | Performed by: NURSE PRACTITIONER

## 2020-10-28 PROCEDURE — 99214 OFFICE O/P EST MOD 30 MIN: CPT | Performed by: NURSE PRACTITIONER

## 2020-10-28 PROCEDURE — G8399 PT W/DXA RESULTS DOCUMENT: HCPCS | Performed by: NURSE PRACTITIONER

## 2020-10-28 PROCEDURE — G8417 CALC BMI ABV UP PARAM F/U: HCPCS | Performed by: NURSE PRACTITIONER

## 2020-10-28 PROCEDURE — G8484 FLU IMMUNIZE NO ADMIN: HCPCS | Performed by: NURSE PRACTITIONER

## 2020-10-28 PROCEDURE — 1090F PRES/ABSN URINE INCON ASSESS: CPT | Performed by: NURSE PRACTITIONER

## 2020-10-28 PROCEDURE — 3023F SPIROM DOC REV: CPT | Performed by: NURSE PRACTITIONER

## 2020-10-28 PROCEDURE — 1123F ACP DISCUSS/DSCN MKR DOCD: CPT | Performed by: NURSE PRACTITIONER

## 2020-10-28 PROCEDURE — 3017F COLORECTAL CA SCREEN DOC REV: CPT | Performed by: NURSE PRACTITIONER

## 2020-10-28 ASSESSMENT — ENCOUNTER SYMPTOMS
SHORTNESS OF BREATH: 0
SORE THROAT: 0
ABDOMINAL PAIN: 0
VOMITING: 0
CHEST TIGHTNESS: 0
RHINORRHEA: 0
COLOR CHANGE: 0
DIARRHEA: 0
NAUSEA: 0

## 2020-10-28 NOTE — PATIENT INSTRUCTIONS
Patient Education        Hypothyroidism: Care Instructions  Your Care Instructions     When you have hypothyroidism, your body doesn't make enough thyroid hormone. This hormone helps your body use energy. If your thyroid level is low, you may feel tired, be constipated, have an increase in your blood pressure, or have dry skin or memory problems. You may also get cold easily, even when it is warm. Women with low thyroid levels may have heavy menstrual periods. A blood test to find your thyroid-stimulating hormone (TSH) level is used to check for hypothyroidism. A high TSH level may mean that you have it. The treatment for hypothyroidism is thyroid hormone pills. You should start to feel better in 1 to 2 weeks. Most people need treatment for the rest of their lives. You will need regular visits with your doctor to make sure you are doing well and that you have the right dose of medicine. Follow-up care is a key part of your treatment and safety. Be sure to make and go to all appointments, and call your doctor if you are having problems. It's also a good idea to know your test results and keep a list of the medicines you take. How can you care for yourself at home? · Take your thyroid hormone medicine exactly as prescribed. Call your doctor if you think you are having a problem with your medicine. Most people do not have side effects if they take the right amount of medicine regularly. ? Take the medicine 30 minutes before breakfast, and do not take it with calcium, vitamins, or iron. ? Do not take extra doses of your thyroid medicine. It will not help you get better any faster, and it may cause side effects. ? If you forget to take a dose, do NOT take a double dose of medicine. Take your usual dose the next day. · Tell your doctor about all prescription, herbal, or over-the-counter products you take. · Take care of yourself. Eat a healthy diet, get enough sleep, and get regular exercise.   When should you call for help? Call 911 anytime you think you may need emergency care. For example, call if:    · You passed out (lost consciousness).     · You have severe trouble breathing.     · You have a very slow heartbeat (less than 60 beats a minute).     · You have a low body temperature (95°F or below). Call your doctor now or seek immediate medical care if:    · You feel tired, sluggish, or weak.     · You have trouble remembering things or concentrating.     · You do not begin to feel better 2 weeks after starting your medicine. Watch closely for changes in your health, and be sure to contact your doctor if you have any problems. Where can you learn more? Go to [email or web address] and sign in to your [employer] account. Enter N365 in the theScore box to learn more about \"Hypothyroidism: Care Instructions. \"     If you do not have an account, please click on the \"Sign Up Now\" link. Current as of: March 31, 2020               Content Version: 12.6  © 1907-8909 [employer]. Care instructions adapted under license by [facility] ([facility]). If you have questions about a medical condition or this instruction, always ask your healthcare professional. [address] any warranty or liability for your use of this information.

## 2020-10-28 NOTE — PROGRESS NOTES
[address] PRIMARY CARE  Laura Cicrand 87   [address]  [address] [address]  Dept: [phone]  Dept Fax: [phone]    Tita Felder is a 72 y.o. female who presentstoday for her medical conditions/complaints as noted below. 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    Started mobic at last visit, arthritis pain is significantly improved on this, no GI distress, no NVD, no melena or hematochezia  Has open orders for labs, has had normal lipid panel in past and declines, will get thyroid and CMP to evaluate kidney and liver function    Thyroid historically well controlled on synthroid 100mcg, due for recheck  Feels well overall, no current complaints, no palpitations, fatigue, hair loss       Hemoglobin A1C (%)   Date Value   01/24/2012 5.5             ( goal A1C is < 7)   No results found for: LABMICR  LDL Cholesterol (mg/dL)   Date Value   01/24/2012 150 (H)       (goal LDL is <100)   AST (U/L)   Date Value   01/24/2012 23     ALT (U/L)   Date Value   01/24/2012 26     BUN (mg/dL)   Date Value   04/17/2016 18     BP Readings from Last 3 Encounters:   10/28/20 122/68   07/27/20 94/76   06/12/19 126/78          ([id number])    Past Medical History:   Diagnosis Date    Asthma     COPD (chronic obstructive pulmonary disease) (HCC)     ESBL (extended spectrum beta-lactamase) producing bacteria infection 07/24/2018    E.  Coli urine    Hypothyroid     Seizures (HCC)       Past Surgical History:   Procedure Laterality Date    BREAST CYST EXCISION Bilateral     fibiritic    CHOLECYSTECTOMY      LUNG REMOVAL, TOTAL Right     OVARIAN CYST REMOVAL 05/16/2001    Diabetes screen  01/24/2015    Lipid screen  01/24/2017    Breast cancer screen  03/28/2019    Annual Wellness Visit (AWV)  07/28/2021    Colon cancer screen fecal DNA test (Cologuard)  08/01/2023    DTaP/Tdap/Td vaccine (2 - Td) 03/25/2027    DEXA (modify frequency per FRAX score)  Completed    Flu vaccine  Completed    Pneumococcal 65+ years Vaccine  Completed    Hepatitis A vaccine  Aged Out    Hepatitis B vaccine  Aged Out    Hib vaccine  Aged Out    Meningococcal (ACWY) vaccine  Aged Out       Subjective:      Review of Systems   Constitutional: Negative for activity change, fatigue and fever. HENT: Negative for congestion, rhinorrhea and sore throat. Eyes: Negative for visual disturbance. Respiratory: Negative for chest tightness and shortness of breath. Cardiovascular: Negative for chest pain and palpitations. Gastrointestinal: Negative for abdominal pain, diarrhea, nausea and vomiting. Endocrine: Negative for polydipsia. Genitourinary: Negative for difficulty urinating. Musculoskeletal: Positive for arthralgias. Negative for myalgias. Skin: Negative for color change. Allergic/Immunologic: Positive for environmental allergies. Neurological: Negative for weakness and headaches. Psychiatric/Behavioral: Negative for behavioral problems, self-injury, sleep disturbance and suicidal ideas. The patient is not nervous/anxious. All other systems reviewed and are negative. Objective:   /68 (Site: Right Upper Arm, Position: Sitting)   Pulse 99   Temp 97.2 °F (36.2 °C)   Ht 5' 2\" (1.575 m)   Wt 150 lb 6.4 oz (68.2 kg)   SpO2 94%   BMI 27.51 kg/m²   Physical Exam  Vitals signs reviewed. Constitutional:       General: She is not in acute distress. Appearance: Normal appearance. HENT:      Head: Normocephalic. Eyes:      Pupils: Pupils are equal, round, and reactive to light.    Neck:      Musculoskeletal: Normal range of motion and neck SORAYA Mcmullen - CNP on 10/28/2020 at 2:22 PM

## 2020-10-29 RX ORDER — LEVOTHYROXINE SODIUM 0.15 MG/1
150 TABLET ORAL DAILY
Qty: 30 TABLET | Refills: 2 | Status: SHIPPED | OUTPATIENT
Start: 2020-10-29 | End: 2021-02-05

## 2020-10-29 RX ORDER — MELOXICAM 15 MG/1
15 TABLET ORAL DAILY PRN
Qty: 30 TABLET | Refills: 0 | Status: SHIPPED | OUTPATIENT
Start: 2020-10-29 | End: 2022-03-16

## 2020-10-29 NOTE — TELEPHONE ENCOUNTER
LOv 10/28/2020    Health Maintenance   Topic Date Due    Hepatitis C screen  1950    Shingles Vaccine (1 of 2) 05/16/2001    Lipid screen  01/24/2017    Breast cancer screen  03/28/2019    Annual Wellness Visit (AWV)  07/28/2021    Colon cancer screen fecal DNA test (Cologuard)  08/01/2023    DTaP/Tdap/Td vaccine (2 - Td) 03/25/2027    DEXA (modify frequency per FRAX score)  Completed    Flu vaccine  Completed    Pneumococcal 65+ years Vaccine  Completed    Hepatitis A vaccine  Aged Out    Hepatitis B vaccine  Aged Out    Hib vaccine  Aged Out    Meningococcal (ACWY) vaccine  Aged Out             (applicable per patient's age: Cancer Screenings, Depression Screening, Fall Risk Screening, Immunizations)    Hemoglobin A1C (%)   Date Value   01/24/2012 5.5     LDL Cholesterol (mg/dL)   Date Value   10/28/2020 133 (H)     AST (U/L)   Date Value   10/28/2020 17     ALT (U/L)   Date Value   10/28/2020 12     BUN (mg/dL)   Date Value   10/28/2020 21      (goal A1C is < 7)   (goal LDL is <100) need 30-50% reduction from baseline     BP Readings from Last 3 Encounters:   10/28/20 122/68   07/27/20 94/76   06/12/19 126/78    (goal /80)      All Future Testing planned in [facility]:  Lab Frequency Next Occurrence   [facility] DIGITAL SCREEN W OR WO CAD BILATERAL Once 11/18/2020   TSH with Reflex Once 12/10/2020       Next Visit Date:  Future Appointments   Date Time Provider Augustus Robison   11/17/2020  3:30 PM [facility] [place] [facility] [place]   4/28/2021  2:00 PM Flor Mcmullen, APRN - CNP [id number]            Patient Active Problem List:     Need for prophylactic vaccination and inoculation against influenza     COPD (chronic obstructive pulmonary disease) ([facility] [address]     Cancer of lung ([facility] [address]     S/P lobectomy of lung     High cholesterol     Osteopenia     Lung nodule     Mild persistent asthma without complication

## 2020-11-17 ENCOUNTER — HOSPITAL ENCOUNTER (OUTPATIENT)
Dept: MAMMOGRAPHY | Age: 70
Discharge: HOME | End: 2020-11-19
Payer: MEDICARE

## 2020-11-17 PROCEDURE — 77063 BREAST TOMOSYNTHESIS BI: CPT

## 2021-01-14 ENCOUNTER — TELEPHONE (OUTPATIENT)
Dept: PRIMARY CARE CLINIC | Age: 71
End: 2021-01-14

## 2021-01-14 NOTE — TELEPHONE ENCOUNTER
Pt called in stating she has asthma, COPD & only 1/2 a lung, should she still get the COVID19 vaccine? Please advise.

## 2021-01-25 ENCOUNTER — TELEPHONE (OUTPATIENT)
Dept: PRIMARY CARE CLINIC | Age: 71
End: 2021-01-25

## 2021-01-25 DIAGNOSIS — U07.1 COVID-19: Primary | ICD-10-CM

## 2021-01-25 RX ORDER — AZITHROMYCIN 250 MG/1
250 TABLET, FILM COATED ORAL SEE ADMIN INSTRUCTIONS
Qty: 6 TABLET | Refills: 0 | Status: SHIPPED | OUTPATIENT
Start: 2021-01-25 | End: 2021-01-30

## 2021-01-25 NOTE — TELEPHONE ENCOUNTER
PT called and said she tested positive for covid on Saturday and asked if she could get a zpack and antibiotic sent to the [address] [place] [address].  Please advice, thank you

## 2021-02-01 ENCOUNTER — TELEPHONE (OUTPATIENT)
Dept: PRIMARY CARE CLINIC | Age: 71
End: 2021-02-01

## 2021-02-01 DIAGNOSIS — Z86.16 PERSONAL HISTORY OF COVID-19: Primary | ICD-10-CM

## 2021-02-04 DIAGNOSIS — E03.9 HYPOTHYROIDISM, UNSPECIFIED TYPE: ICD-10-CM

## 2021-02-05 RX ORDER — LEVOTHYROXINE SODIUM 150 UG/1
TABLET ORAL
Qty: 30 TABLET | Refills: 0 | Status: SHIPPED | OUTPATIENT
Start: 2021-02-05 | End: 2021-02-09

## 2021-02-08 ENCOUNTER — HOSPITAL ENCOUNTER (OUTPATIENT)
Age: 71
Setting detail: SPECIMEN
Discharge: HOME | End: 2021-02-08
Payer: COMMERCIAL

## 2021-02-08 DIAGNOSIS — R79.89 ELEVATED TSH: ICD-10-CM

## 2021-02-08 DIAGNOSIS — E03.9 HYPOTHYROIDISM, UNSPECIFIED TYPE: ICD-10-CM

## 2021-02-08 LAB
THYROXINE, FREE: 2.07 NG/DL (ref 0.93–1.7)
TSH SERPL DL<=0.05 MIU/L-ACNC: 0.06 MIU/L (ref 0.3–5)

## 2021-02-09 DIAGNOSIS — E03.9 HYPOTHYROIDISM, UNSPECIFIED TYPE: ICD-10-CM

## 2021-02-09 RX ORDER — LEVOTHYROXINE SODIUM 0.12 MG/1
125 TABLET ORAL DAILY
Qty: 30 TABLET | Refills: 1 | Status: SHIPPED | OUTPATIENT
Start: 2021-02-09 | End: 2021-04-29 | Stop reason: SDUPTHER

## 2021-02-10 ENCOUNTER — OFFICE VISIT (OUTPATIENT)
Dept: PRIMARY CARE CLINIC | Age: 71
End: 2021-02-10
Payer: MEDICARE

## 2021-02-10 ENCOUNTER — HOSPITAL ENCOUNTER (OUTPATIENT)
Age: 71
Setting detail: SPECIMEN
Discharge: HOME | End: 2021-02-10
Payer: MEDICARE

## 2021-02-10 VITALS
BODY MASS INDEX: 27.25 KG/M2 | OXYGEN SATURATION: 99 % | WEIGHT: 149 LBS | HEART RATE: 106 BPM | DIASTOLIC BLOOD PRESSURE: 84 MMHG | SYSTOLIC BLOOD PRESSURE: 142 MMHG

## 2021-02-10 DIAGNOSIS — Z20.822 EXPOSURE TO COVID-19 VIRUS: Primary | ICD-10-CM

## 2021-02-10 DIAGNOSIS — E03.9 HYPOTHYROIDISM, UNSPECIFIED TYPE: ICD-10-CM

## 2021-02-10 DIAGNOSIS — Z90.2 S/P LOBECTOMY OF LUNG: ICD-10-CM

## 2021-02-10 DIAGNOSIS — C34.91 MALIGNANT NEOPLASM OF RIGHT LUNG, UNSPECIFIED PART OF LUNG (HCC): ICD-10-CM

## 2021-02-10 DIAGNOSIS — J45.30 MILD PERSISTENT ASTHMA WITHOUT COMPLICATION: ICD-10-CM

## 2021-02-10 DIAGNOSIS — J42 CHRONIC BRONCHITIS, UNSPECIFIED CHRONIC BRONCHITIS TYPE (HCC): ICD-10-CM

## 2021-02-10 DIAGNOSIS — Z86.16 PERSONAL HISTORY OF COVID-19: ICD-10-CM

## 2021-02-10 LAB — SARS-COV-2 ANTIBODY, TOTAL: NEGATIVE

## 2021-02-10 PROCEDURE — G8926 SPIRO NO PERF OR DOC: HCPCS | Performed by: NURSE PRACTITIONER

## 2021-02-10 PROCEDURE — G8484 FLU IMMUNIZE NO ADMIN: HCPCS | Performed by: NURSE PRACTITIONER

## 2021-02-10 PROCEDURE — G8417 CALC BMI ABV UP PARAM F/U: HCPCS | Performed by: NURSE PRACTITIONER

## 2021-02-10 PROCEDURE — G8399 PT W/DXA RESULTS DOCUMENT: HCPCS | Performed by: NURSE PRACTITIONER

## 2021-02-10 PROCEDURE — 1036F TOBACCO NON-USER: CPT | Performed by: NURSE PRACTITIONER

## 2021-02-10 PROCEDURE — 1123F ACP DISCUSS/DSCN MKR DOCD: CPT | Performed by: NURSE PRACTITIONER

## 2021-02-10 PROCEDURE — 1090F PRES/ABSN URINE INCON ASSESS: CPT | Performed by: NURSE PRACTITIONER

## 2021-02-10 PROCEDURE — G8427 DOCREV CUR MEDS BY ELIG CLIN: HCPCS | Performed by: NURSE PRACTITIONER

## 2021-02-10 PROCEDURE — 3017F COLORECTAL CA SCREEN DOC REV: CPT | Performed by: NURSE PRACTITIONER

## 2021-02-10 PROCEDURE — 3023F SPIROM DOC REV: CPT | Performed by: NURSE PRACTITIONER

## 2021-02-10 PROCEDURE — 99214 OFFICE O/P EST MOD 30 MIN: CPT | Performed by: NURSE PRACTITIONER

## 2021-02-10 PROCEDURE — 4040F PNEUMOC VAC/ADMIN/RCVD: CPT | Performed by: NURSE PRACTITIONER

## 2021-02-10 ASSESSMENT — PATIENT HEALTH QUESTIONNAIRE - PHQ9
2. FEELING DOWN, DEPRESSED OR HOPELESS: 0
SUM OF ALL RESPONSES TO PHQ QUESTIONS 1-9: 0
SUM OF ALL RESPONSES TO PHQ QUESTIONS 1-9: 0

## 2021-02-10 NOTE — PROGRESS NOTES
 Breast cancer screen  11/17/2022    Colon cancer screen fecal DNA test (Cologuard)  08/01/2023    Lipid screen  10/28/2025    DTaP/Tdap/Td vaccine (2 - Td) 03/25/2027    DEXA (modify frequency per FRAX score)  Completed    Flu vaccine  Completed    Pneumococcal 65+ years Vaccine  Completed    Hepatitis C screen  Completed    Hepatitis A vaccine  Aged Out    Hepatitis B vaccine  Aged Out    Hib vaccine  Aged Out    Meningococcal (ACWY) vaccine  Aged Out       Subjective:      Review of Systems   Constitutional: Negative for activity change, fatigue and fever. HENT: Negative for congestion, rhinorrhea and sore throat. Eyes: Negative for visual disturbance. Respiratory: Negative for chest tightness and shortness of breath. Cardiovascular: Negative for chest pain and palpitations. Gastrointestinal: Negative for abdominal pain, diarrhea, nausea and vomiting. Endocrine: Negative for polydipsia. Genitourinary: Negative for difficulty urinating. Musculoskeletal: Negative for arthralgias and myalgias. Skin: Negative for color change. Neurological: Negative for weakness and headaches. Psychiatric/Behavioral: Negative for behavioral problems. The patient is not nervous/anxious. All other systems reviewed and are negative. Objective:   BP (!) 142/84 (Site: Right Upper Arm, Position: Sitting)   Pulse 106   Wt 149 lb (67.6 kg)   SpO2 99%   BMI 27.25 kg/m²   Physical Exam  Vitals signs reviewed. Constitutional:       General: She is not in acute distress. Appearance: Normal appearance. HENT:      Head: Normocephalic. Eyes:      Pupils: Pupils are equal, round, and reactive to light. Neck:      Musculoskeletal: Neck supple. Vascular: No carotid bruit. Cardiovascular:      Rate and Rhythm: Normal rate and regular rhythm. Pulses: Normal pulses. Heart sounds: Normal heart sounds.    Pulmonary:      Effort: Pulmonary effort is normal.      Comments: Absent breath sounds right  Abdominal:      General: There is no distension. Musculoskeletal: Normal range of motion. Right lower leg: No edema. Left lower leg: No edema. Lymphadenopathy:      Cervical: No cervical adenopathy. Skin:     General: Skin is warm and dry. Capillary Refill: Capillary refill takes less than 2 seconds. Neurological:      General: No focal deficit present. Mental Status: She is alert and oriented to person, place, and time. Psychiatric:         Mood and Affect: Mood normal.         Behavior: Behavior normal.           :       Diagnosis Orders   1. Exposure to COVID-19 virus     2. Hypothyroidism, unspecified type  TSH with Reflex   3. Chronic bronchitis, unspecified chronic bronchitis type ([address]     4. Malignant neoplasm of right lung, unspecified part of lung ([address]     5. Mild persistent asthma without complication     6. S/P lobectomy of lung               :          1. Exposure to COVID-19 virus  Will test for antibody again so she can appropriately decide when to get covid-19 vaccine    2. Hypothyroidism, unspecified type  Waxing and waning, levothyroxine decreased to 125mcg daily, will recheck lab 6 weeks, asymptomatic  - TSH with Reflex; Future    3. Chronic bronchitis, unspecified chronic bronchitis type ([address]  4. Malignant neoplasm of right lung, unspecified part of lung ([address]  5. Mild persistent asthma without complication  6. S/P lobectomy of lung  Well controlled with PRN albuterol, not following with pulm and no longer using spiriva. Lengthy discussion with patient and  regarding current co morbidities and risk/benefit to covid vaccination. All questions answered to pt's satisfaction. Return in about 6 months (around 8/10/2021) for thyroid, asthma, COPD. Patient given educational materials - see patient instructions. Discussed use, benefit, and side effects of prescribed medications. All patient questions answered. Pt voiced understanding. Reviewed health maintenance. Instructed to continue current medications, diet and exercise. Patient agreed with treatment plan. Follow up as directed.        Electronicallysigned by SORAYA Loja CNP on 2/11/2021 at 9:01 AM

## 2021-02-10 NOTE — PATIENT INSTRUCTIONS
Schedule a Vaccine  When you qualify to receive the vaccine, call the [facility]) COVID-19 Vaccination Hotline to schedule your appointment or to get additional information about the [facility]) locations which are offering the COVID-19 vaccine. To be 94% effective, it's important that you receive two doses of one of the COVID-19 vaccines. -If you are receiving the Verdin Peter vaccine, your second shot will be scheduled as close to 21 days after the first shot as possible. -If you are receiving the Moderna vaccine, your second shot will be scheduled as close to 28 days after the first shot as possible. [facility]) COVID-19 Vaccination Hotline: [phone]    Links to [facility]) website and [facility] website:    [email or web address]    [email or web address]

## 2021-02-11 ASSESSMENT — ENCOUNTER SYMPTOMS
CHEST TIGHTNESS: 0
COLOR CHANGE: 0
SHORTNESS OF BREATH: 0
ABDOMINAL PAIN: 0
SORE THROAT: 0
VOMITING: 0
DIARRHEA: 0
RHINORRHEA: 0
NAUSEA: 0

## 2021-04-28 ENCOUNTER — HOSPITAL ENCOUNTER (OUTPATIENT)
Age: 71
Setting detail: SPECIMEN
Discharge: HOME | End: 2021-04-28
Payer: MEDICARE

## 2021-04-28 ENCOUNTER — OFFICE VISIT (OUTPATIENT)
Dept: PRIMARY CARE CLINIC | Age: 71
End: 2021-04-28
Payer: MEDICARE

## 2021-04-28 VITALS
HEART RATE: 107 BPM | DIASTOLIC BLOOD PRESSURE: 70 MMHG | SYSTOLIC BLOOD PRESSURE: 94 MMHG | OXYGEN SATURATION: 99 % | BODY MASS INDEX: 27.98 KG/M2 | WEIGHT: 153 LBS

## 2021-04-28 DIAGNOSIS — E03.9 HYPOTHYROIDISM, UNSPECIFIED TYPE: ICD-10-CM

## 2021-04-28 DIAGNOSIS — E86.0 DEHYDRATION: ICD-10-CM

## 2021-04-28 DIAGNOSIS — C34.91 MALIGNANT NEOPLASM OF RIGHT LUNG, UNSPECIFIED PART OF LUNG (HCC): ICD-10-CM

## 2021-04-28 DIAGNOSIS — J45.30 MILD PERSISTENT ASTHMA WITHOUT COMPLICATION: Primary | ICD-10-CM

## 2021-04-28 DIAGNOSIS — R52 BURNING PAIN: ICD-10-CM

## 2021-04-28 DIAGNOSIS — J41.0 SIMPLE CHRONIC BRONCHITIS (HCC): ICD-10-CM

## 2021-04-28 DIAGNOSIS — G56.92 NEUROPATHY OF LEFT UPPER EXTREMITY: ICD-10-CM

## 2021-04-28 PROCEDURE — G8427 DOCREV CUR MEDS BY ELIG CLIN: HCPCS | Performed by: NURSE PRACTITIONER

## 2021-04-28 PROCEDURE — 1090F PRES/ABSN URINE INCON ASSESS: CPT | Performed by: NURSE PRACTITIONER

## 2021-04-28 PROCEDURE — G8417 CALC BMI ABV UP PARAM F/U: HCPCS | Performed by: NURSE PRACTITIONER

## 2021-04-28 PROCEDURE — G8926 SPIRO NO PERF OR DOC: HCPCS | Performed by: NURSE PRACTITIONER

## 2021-04-28 PROCEDURE — 1123F ACP DISCUSS/DSCN MKR DOCD: CPT | Performed by: NURSE PRACTITIONER

## 2021-04-28 PROCEDURE — G8399 PT W/DXA RESULTS DOCUMENT: HCPCS | Performed by: NURSE PRACTITIONER

## 2021-04-28 PROCEDURE — 99214 OFFICE O/P EST MOD 30 MIN: CPT | Performed by: NURSE PRACTITIONER

## 2021-04-28 PROCEDURE — 4040F PNEUMOC VAC/ADMIN/RCVD: CPT | Performed by: NURSE PRACTITIONER

## 2021-04-28 PROCEDURE — 3023F SPIROM DOC REV: CPT | Performed by: NURSE PRACTITIONER

## 2021-04-28 PROCEDURE — 1036F TOBACCO NON-USER: CPT | Performed by: NURSE PRACTITIONER

## 2021-04-28 PROCEDURE — 3017F COLORECTAL CA SCREEN DOC REV: CPT | Performed by: NURSE PRACTITIONER

## 2021-04-28 RX ORDER — GABAPENTIN 100 MG/1
100 CAPSULE ORAL NIGHTLY
Qty: 30 CAPSULE | Refills: 1 | Status: SHIPPED | OUTPATIENT
Start: 2021-04-28 | End: 2022-06-13 | Stop reason: SDUPTHER

## 2021-04-28 ASSESSMENT — ENCOUNTER SYMPTOMS
COUGH: 0
VOMITING: 0
CHEST TIGHTNESS: 0
SORE THROAT: 0
DIARRHEA: 0
NAUSEA: 0
COLOR CHANGE: 0
SHORTNESS OF BREATH: 0
WHEEZING: 0
RHINORRHEA: 0
ABDOMINAL PAIN: 0

## 2021-04-28 NOTE — PROGRESS NOTES
bacteria infection 2018    E. Coli urine    Hypothyroid     Seizures (HCC)       Past Surgical History:   Procedure Laterality Date    BREAST CYST EXCISION Bilateral     fibiritic    CHOLECYSTECTOMY      LUNG REMOVAL, TOTAL Right     OVARIAN CYST REMOVAL Bilateral     TONSILLECTOMY      TUBAL LIGATION         No family history on file. Social History     Tobacco Use    Smoking status: Former Smoker     Packs/day: 0.50     Years: 23.00     Pack years: 11.50     Types: Cigarettes     Quit date: 10/7/1995     Years since quittin.5    Smokeless tobacco: Never Used   Substance Use Topics    Alcohol use: No     Alcohol/week: 0.0 standard drinks      Current Outpatient Medications   Medication Sig Dispense Refill    gabapentin (NEURONTIN) 100 MG capsule Take 1 capsule by mouth nightly for 30 days. 30 capsule 1    levothyroxine (EUTHYROX) 125 MCG tablet Take 1 tablet by mouth Daily 30 tablet 1    meloxicam (MOBIC) 15 MG tablet Take 1 tablet by mouth daily as needed for Pain 30 tablet 0    albuterol sulfate HFA (PROAIR HFA) 108 (90 Base) MCG/ACT inhaler Inhale 2 puffs into the lungs every 6 hours as needed for Wheezing 1 Inhaler 3    albuterol (PROVENTIL) (2.5 MG/3ML) 0.083% nebulizer solution Take 3 mLs by nebulization every 6 hours as needed for Wheezing.  120 each 3     Current Facility-Administered Medications   Medication Dose Route Frequency Provider Last Rate Last Admin    albuterol (ACCUNEB) nebulizer solution 0.63 mg  0.63 mg Nebulization Once Kemar Membreno MD        methylPREDNISolone acetate (DEPO-MEDROL) injection 80 mg  80 mg Intramuscular Once Kemar Membreno MD        methylPREDNISolone acetate (DEPO-MEDROL) injection 40 mg  40 mg Intramuscular Once Kemar Membreno MD         Allergies   Allergen Reactions    Benadryl [Diphenhydramine]     Codeine     Dilantin  [Phenytoin Sodium Extended]     Dye [Iodides]     Lorazepam     Prochlorperazine Edisylate  Sulfa Antibiotics     Sulfamethoxazole-Trimethoprim     Zofran [Ondansetron Hcl] Nausea And Vomiting       Health Maintenance   Topic Date Due    Shingles Vaccine (1 of 2) Never done   [employer] Visit (AWV)  07/28/2021    Breast cancer screen  11/17/2022    Colon cancer screen fecal DNA test (Cologuard)  08/01/2023    Lipid screen  10/28/2025    DTaP/Tdap/Td vaccine (2 - Td) 03/25/2027    DEXA (modify frequency per FRAX score)  Completed    Flu vaccine  Completed    Pneumococcal 65+ years Vaccine  Completed    COVID-19 Vaccine  Completed    Hepatitis C screen  Completed    Hepatitis A vaccine  Aged Out    Hepatitis B vaccine  Aged Out    Hib vaccine  Aged Out    Meningococcal (ACWY) vaccine  Aged Out       Subjective:      Review of Systems   Constitutional: Negative for activity change, fatigue and fever. HENT: Negative for congestion, rhinorrhea and sore throat. Eyes: Negative for visual disturbance. Respiratory: Negative for cough, chest tightness, shortness of breath and wheezing. Cardiovascular: Negative for chest pain and palpitations. Gastrointestinal: Negative for abdominal pain, diarrhea, nausea and vomiting. Endocrine: Negative for polydipsia. Genitourinary: Negative for difficulty urinating. Musculoskeletal: Positive for myalgias. Negative for arthralgias. Skin: Negative for color change. Allergic/Immunologic: Positive for environmental allergies. Neurological: Negative for weakness and headaches. Psychiatric/Behavioral: Negative for behavioral problems. The patient is not nervous/anxious. All other systems reviewed and are negative. Objective:   BP 94/70 (Site: Left Upper Arm, Position: Sitting)   Pulse 107   Wt 153 lb (69.4 kg)   SpO2 99%   BMI 27.98 kg/m²   Physical Exam  Vitals signs reviewed. Constitutional:       General: She is not in acute distress. Appearance: Normal appearance. HENT:      Head: Normocephalic.    Eyes: Pupils: Pupils are equal, round, and reactive to light. Neck:      Musculoskeletal: Neck supple. Cardiovascular:      Rate and Rhythm: Normal rate and regular rhythm. Pulses: Normal pulses. Heart sounds: Normal heart sounds. Pulmonary:      Effort: Pulmonary effort is normal.      Breath sounds: Normal breath sounds. Comments: Absent on right   Abdominal:      General: There is no distension. Musculoskeletal: Normal range of motion. Right lower leg: No edema. Left lower leg: No edema. Lymphadenopathy:      Cervical: No cervical adenopathy. Skin:     General: Skin is warm and dry. Capillary Refill: Capillary refill takes less than 2 seconds. Neurological:      General: No focal deficit present. Mental Status: She is alert and oriented to person, place, and time. Psychiatric:         Mood and Affect: Mood normal.         Behavior: Behavior normal.           :       Diagnosis Orders   1. Mild persistent asthma without complication     2. Simple chronic bronchitis ([address]     3. Malignant neoplasm of right lung, unspecified part of lung ([address]     4. Hypothyroidism, unspecified type     5. Neuropathy of left upper extremity  gabapentin (NEURONTIN) 100 MG capsule   6. Burning pain  gabapentin (NEURONTIN) 100 MG capsule   7. Dehydration               :          1. Mild persistent asthma without complication  Stable, albuterol PRN, avoidance of triggers    2. Simple chronic bronchitis ([address]  3. Malignant neoplasm of right lung, unspecified part of lung ([address]  S/p right pneumonectomy, stable    4. Hypothyroidism, unspecified type  Chronic, due for lab recheck today, continue levothyroxine 125mcg, will adjust as needed    5. Neuropathy of left upper extremity  6. Burning pain  New, trial neurontin 100mg nightly, continue mobic daily as needed, RICE. If pain persists, will consider imaging of neck or shoulder.  - gabapentin (NEURONTIN) 100 MG capsule;  Take 1 capsule by mouth

## 2021-04-29 DIAGNOSIS — E03.9 HYPOTHYROIDISM, UNSPECIFIED TYPE: ICD-10-CM

## 2021-04-29 LAB — TSH SERPL DL<=0.05 MIU/L-ACNC: 0.99 MIU/L (ref 0.3–5)

## 2021-04-29 RX ORDER — LEVOTHYROXINE SODIUM 0.12 MG/1
125 TABLET ORAL DAILY
Qty: 30 TABLET | Refills: 1 | Status: SHIPPED | OUTPATIENT
Start: 2021-04-29 | End: 2021-06-30 | Stop reason: SDUPTHER

## 2021-06-30 DIAGNOSIS — E03.9 HYPOTHYROIDISM, UNSPECIFIED TYPE: ICD-10-CM

## 2021-06-30 RX ORDER — LEVOTHYROXINE SODIUM 0.12 MG/1
125 TABLET ORAL DAILY
Qty: 30 TABLET | Refills: 1 | Status: SHIPPED | OUTPATIENT
Start: 2021-06-30 | End: 2021-09-01

## 2021-08-16 DIAGNOSIS — J41.0 SIMPLE CHRONIC BRONCHITIS (HCC): Primary | ICD-10-CM

## 2021-08-16 DIAGNOSIS — J45.30 MILD PERSISTENT ASTHMA WITHOUT COMPLICATION: ICD-10-CM

## 2021-08-16 RX ORDER — ALBUTEROL SULFATE 2.5 MG/3ML
2.5 SOLUTION RESPIRATORY (INHALATION) EVERY 6 HOURS PRN
Qty: 120 EACH | Refills: 3 | Status: SHIPPED | OUTPATIENT
Start: 2021-08-16 | End: 2021-08-16 | Stop reason: SDUPTHER

## 2021-08-16 RX ORDER — ALBUTEROL SULFATE 2.5 MG/3ML
2.5 SOLUTION RESPIRATORY (INHALATION) EVERY 6 HOURS PRN
Qty: 120 EACH | Refills: 3 | Status: SHIPPED | OUTPATIENT
Start: 2021-08-16

## 2021-08-17 ENCOUNTER — TELEPHONE (OUTPATIENT)
Dept: PRIMARY CARE CLINIC | Age: 71
End: 2021-08-17

## 2021-08-17 NOTE — TELEPHONE ENCOUNTER
----- Message from Micah Adorno sent at 8/17/2021 10:39 AM EDT -----  Subject: Message to Provider    QUESTIONS  Information for Provider? Pt is calling back to check on prescription   refill for Albuterol Sulfate 2.5 mg Nebulization EVERY 6 HOURS PRN. Please   call pt to advise.  ---------------------------------------------------------------------------  --------------  CALL BACK INFO  What is the best way for the office to contact you? Do not leave any   message, patient will call back for answer  Preferred Call Back Phone Number? [phone]  ---------------------------------------------------------------------------  --------------  SCRIPT ANSWERS  Relationship to Patient?  Self

## 2021-10-03 ENCOUNTER — APPOINTMENT (OUTPATIENT)
Dept: GENERAL RADIOLOGY | Age: 71
End: 2021-10-03
Payer: MEDICARE

## 2021-10-03 ENCOUNTER — HOSPITAL ENCOUNTER (EMERGENCY)
Age: 71
Discharge: HOME | End: 2021-10-03
Attending: EMERGENCY MEDICINE
Payer: MEDICARE

## 2021-10-03 VITALS
HEART RATE: 115 BPM | HEIGHT: 62 IN | RESPIRATION RATE: 20 BRPM | WEIGHT: 147 LBS | TEMPERATURE: 97.9 F | OXYGEN SATURATION: 99 % | BODY MASS INDEX: 27.05 KG/M2 | DIASTOLIC BLOOD PRESSURE: 87 MMHG | SYSTOLIC BLOOD PRESSURE: 156 MMHG

## 2021-10-03 DIAGNOSIS — J44.1 COPD EXACERBATION (HCC): Primary | ICD-10-CM

## 2021-10-03 PROCEDURE — 99284 EMERGENCY DEPT VISIT MOD MDM: CPT

## 2021-10-03 PROCEDURE — 71045 X-RAY EXAM CHEST 1 VIEW: CPT

## 2021-10-03 RX ORDER — PREDNISONE 10 MG/1
40 TABLET ORAL DAILY
Qty: 20 TABLET | Refills: 0 | Status: SHIPPED | OUTPATIENT
Start: 2021-10-03 | End: 2021-10-08

## 2021-10-03 ASSESSMENT — ENCOUNTER SYMPTOMS
EYE REDNESS: 0
ABDOMINAL PAIN: 0
SORE THROAT: 0
CHEST TIGHTNESS: 0
CONSTIPATION: 0
COUGH: 0
NAUSEA: 0
SHORTNESS OF BREATH: 1
EYE PAIN: 0
BACK PAIN: 0
DIARRHEA: 0
VOMITING: 0

## 2021-10-03 NOTE — ED PROVIDER NOTES
[address] ED  eMERGENCY dEPARTMENT eNCOUnter    Pt Name: Eva Villeda  MRN: [id number]  [place] 1950  Date of evaluation: 10/3/21  CHIEF COMPLAINT       Chief Complaint   Patient presents with    Asthma    Shortness of Breath     HISTORY OF PRESENT ILLNESS   HPI   Patient presenting with a resolved episode of shortness of breath prior to arrival. Her chest felt tight and wheezy, she took a breathing treatment and felt like she was suffocating and could not breath. Sx improved in car ride with [facility]. She has a PMH of COPD and frequently has flares during humid rainy weather like today. She is currently feeling much better. She has inhalers and breathing tx at home for her COPD. REVIEW OF SYSTEMS     Review of Systems   Constitutional: Negative for chills and fever. HENT: Negative for congestion, ear pain and sore throat. Eyes: Negative for pain, redness and visual disturbance. Respiratory: Positive for shortness of breath. Negative for cough and chest tightness. Cardiovascular: Negative for chest pain and palpitations. Gastrointestinal: Negative for abdominal pain, constipation, diarrhea, nausea and vomiting. Genitourinary: Negative for dysuria and vaginal discharge. Musculoskeletal: Negative for back pain and neck pain. Skin: Negative for rash and wound. Neurological: Negative for seizures, syncope and headaches. PASTMEDICAL HISTORY     Past Medical History:   Diagnosis Date    Asthma     COPD (chronic obstructive pulmonary disease) (HCC)     ESBL (extended spectrum beta-lactamase) producing bacteria infection 07/24/2018    E.  Coli urine    Hypothyroid     [facility]ures ([facility]      SURGICAL HISTORY       Past Surgical History:   Procedure Laterality Date    BREAST CYST EXCISION Bilateral     fibiritic    CHOLECYSTECTOMY      LUNG REMOVAL, TOTAL Right     OVARIAN CYST REMOVAL Bilateral     TONSILLECTOMY      TUBAL LIGATION       CURRENT MEDICATIONS       Discharge Medication List as of 10/3/2021 11:26 AM      CONTINUE these medications which have NOT CHANGED    Details   EUTHYROX 125 MCG tablet Take 1 tablet by mouth once daily, Disp-30 tablet, R-2Normal      albuterol (PROVENTIL) (2.5 MG/3ML) 0.083% nebulizer solution Take 3 mLs by nebulization every 6 hours as needed for Wheezing, Disp-120 each, R-3Normal      gabapentin (NEURONTIN) 100 MG capsule Take 1 capsule by mouth nightly for 30 days. , Disp-30 capsule, R-1Normal      meloxicam (MOBIC) 15 MG tablet Take 1 tablet by mouth daily as needed for Pain, Disp-30 tablet, R-0Normal      albuterol sulfate HFA (PROAIR HFA) 108 (90 Base) MCG/ACT inhaler Inhale 2 puffs into the lungs every 6 hours as needed for Wheezing, Disp-1 Inhaler,R-3Normal           ALLERGIES     is allergic to benadryl [diphenhydramine], codeine, dilantin  [phenytoin sodium extended], dye [iodides], lorazepam, prochlorperazine edisylate, sulfa antibiotics, sulfamethoxazole-trimethoprim, and zofran [ondansetron hcl]. FAMILY HISTORY     has no family status information on file. SOCIALHISTORY      reports that she quit smoking about 26 years ago. Her smoking use included cigarettes. She has a 11.50 pack-year smoking history. She has never used smokeless tobacco. She reports that she does not drink alcohol and does not use drugs. PHYSICAL EXAM     INITIAL VITALS: BP (!) 156/87   Pulse 115   Temp 97.9 °F (36.6 °C) (Oral)   Resp 20   Ht 5' 2\" (1.575 m)   Wt 147 lb (66.7 kg)   SpO2 99%   BMI 26.89 kg/m²    Physical Exam  Vitals and nursing note reviewed. Constitutional:       Appearance: She is well-developed. HENT:      Head: Normocephalic and atraumatic. Right Ear: External ear normal.      Left Ear: External ear normal.   Eyes:      General:         Left eye: No discharge. Conjunctiva/sclera: Conjunctivae normal.      Pupils: Pupils are equal, round, and reactive to light. Neck:      Vascular: No JVD.       Trachea: No tracheal deviation. Cardiovascular:      Rate and Rhythm: Normal rate and regular rhythm. Heart sounds: Normal heart sounds. Pulmonary:      Effort: Pulmonary effort is normal. No respiratory distress. Breath sounds: Normal breath sounds. No stridor. Comments: Clear lung sounds left, diminished right (prior pneumonectomy)  Abdominal:      General: Bowel sounds are normal.      Palpations: Abdomen is soft. Tenderness: There is no abdominal tenderness. Musculoskeletal:         General: No tenderness or deformity. Normal range of motion. Cervical back: Normal range of motion and neck supple. Skin:     General: Skin is warm and dry. Neurological:      Mental Status: She is alert and oriented to person, place, and time. Cranial Nerves: No cranial nerve deficit. Coordination: Coordination normal.         MEDICAL DECISION MAKING:   Assessment:  Sara hBeth Garza is a 80 y.o. female who presents with a resolved episode of SOB. ED Course/MDM:   Patient arrived hemodynamically stable and in no acute distress. Patient's previous imaging and studies reviewed. CXR unremarkable. Feels similar to previous COPD exacerbations. I recommended cardiac workup and patient refused EKG and blood work because she feels sure her symptoms are related to COPD. DC home with steroids and PCP f/up. Procedures    DIAGNOSTIC RESULTS   EKG: All EKG's are interpreted by the Emergency Department Physician who either signs or Co-signs this chart inthe absence of a cardiologist.      RADIOLOGY:All plain film, CT, MRI, and formal ultrasound images (except ED bedside ultrasound) are read by the radiologist, see reports below, unless otherwise noted in MDM or here. XR CHEST PORTABLE   Final Result   The left lung is clear in a patient with a prior history of right   pneumonectomy. LABS: All lab results were reviewed by myself, and all abnormals are listed below.   Labs Reviewed - No data to display  EMERGENCY DEPARTMENT COURSE:   Vitals:    Vitals:    10/03/21 0953   BP: (!) 156/87   Pulse: 115   Resp: 20   Temp: 97.9 °F (36.6 °C)   TempSrc: Oral   SpO2: 99%   Weight: 147 lb (66.7 kg)   Height: 5' 2\" (1.575 m)       The patient was given the following medications while in the emergency department:  Orders Placed This Encounter   Medications    predniSONE (DELTASONE) 10 MG tablet     Sig: Take 4 tablets by mouth daily for 5 days     Dispense:  20 tablet     Refill:  0     CONSULTS:  None    FINAL IMPRESSION      1. COPD exacerbation ([facility]          DISPOSITION/PLAN   DISPOSITION Decision To Discharge 10/03/2021 11:26:12 AM      PATIENT REFERRED TO:  SORAYA Menon - CNP  [address]  Dr. Walter Jessica [address] [place] [id number]          DISCHARGE MEDICATIONS:  Discharge Medication List as of 10/3/2021 11:26 AM      START taking these medications    Details   predniSONE (DELTASONE) 10 MG tablet Take 4 tablets by mouth daily for 5 days, Disp-20 tablet, R-0Print           Morgan Salinas MD  [facility]endingEmer[facility] Physician                        Sukumar Rider MD  10/03/21 4005

## 2021-10-03 NOTE — ED TRIAGE NOTES
Mode of arrival (squad #, walk in, police, etc) : walk in        Chief complaint(s): SOB        Arrival Note (brief scenario, treatment PTA, etc). : Pt states she had an asthma exacerbation this morning that was not relived by her home nebulizer. Pt states she uses albuterol nebs and inhalers. Pt was able to ambulate to the room without assistance and is able to speak in full sentences without distress. C= \"Have you ever felt that you should Cut down on your drinking? \"  No  A= \"Have people Annoyed you by criticizing your drinking? \"  No  G= \"Have you ever felt bad or Guilty about your drinking? \"  No  E= \"Have you ever had a drink as an Eye-opener first thing in the morning to steady your nerves or to help a hangover? \"  No      Deferred []      Reason for deferring: N/A    *If yes to two or more: probable alcohol abuse. *

## 2021-10-03 NOTE — ED NOTES
During IV insertion pt stated she felt like she was going to have a seizure, pt then had a blank stare and was not responsive to stimuli. This episode was brief and when it ended the pt stated \"I had a seizure\". Pt states she has been having seizures for 30 years. MD notified.       Dominic Tsai  10/03/21 1014

## 2021-10-08 ENCOUNTER — TELEPHONE (OUTPATIENT)
Dept: PRIMARY CARE CLINIC | Age: 71
End: 2021-10-08

## 2021-10-14 ENCOUNTER — OFFICE VISIT (OUTPATIENT)
Dept: PRIMARY CARE CLINIC | Age: 71
End: 2021-10-14
Payer: MEDICARE

## 2021-10-14 VITALS
HEIGHT: 62 IN | HEART RATE: 118 BPM | OXYGEN SATURATION: 94 % | RESPIRATION RATE: 16 BRPM | SYSTOLIC BLOOD PRESSURE: 122 MMHG | WEIGHT: 146.6 LBS | DIASTOLIC BLOOD PRESSURE: 82 MMHG | BODY MASS INDEX: 26.98 KG/M2

## 2021-10-14 DIAGNOSIS — J45.30 MILD PERSISTENT ASTHMA WITHOUT COMPLICATION: Primary | ICD-10-CM

## 2021-10-14 DIAGNOSIS — J41.0 SIMPLE CHRONIC BRONCHITIS (HCC): ICD-10-CM

## 2021-10-14 DIAGNOSIS — C34.91 MALIGNANT NEOPLASM OF RIGHT LUNG, UNSPECIFIED PART OF LUNG (HCC): ICD-10-CM

## 2021-10-14 DIAGNOSIS — Z90.2 S/P LOBECTOMY OF LUNG: ICD-10-CM

## 2021-10-14 PROCEDURE — 3023F SPIROM DOC REV: CPT | Performed by: PHYSICIAN ASSISTANT

## 2021-10-14 PROCEDURE — 99214 OFFICE O/P EST MOD 30 MIN: CPT | Performed by: PHYSICIAN ASSISTANT

## 2021-10-14 PROCEDURE — 1090F PRES/ABSN URINE INCON ASSESS: CPT | Performed by: PHYSICIAN ASSISTANT

## 2021-10-14 PROCEDURE — G8484 FLU IMMUNIZE NO ADMIN: HCPCS | Performed by: PHYSICIAN ASSISTANT

## 2021-10-14 PROCEDURE — 1123F ACP DISCUSS/DSCN MKR DOCD: CPT | Performed by: PHYSICIAN ASSISTANT

## 2021-10-14 PROCEDURE — 1036F TOBACCO NON-USER: CPT | Performed by: PHYSICIAN ASSISTANT

## 2021-10-14 PROCEDURE — 4040F PNEUMOC VAC/ADMIN/RCVD: CPT | Performed by: PHYSICIAN ASSISTANT

## 2021-10-14 PROCEDURE — G8926 SPIRO NO PERF OR DOC: HCPCS | Performed by: PHYSICIAN ASSISTANT

## 2021-10-14 PROCEDURE — G8417 CALC BMI ABV UP PARAM F/U: HCPCS | Performed by: PHYSICIAN ASSISTANT

## 2021-10-14 PROCEDURE — G8399 PT W/DXA RESULTS DOCUMENT: HCPCS | Performed by: PHYSICIAN ASSISTANT

## 2021-10-14 PROCEDURE — G8427 DOCREV CUR MEDS BY ELIG CLIN: HCPCS | Performed by: PHYSICIAN ASSISTANT

## 2021-10-14 PROCEDURE — 3017F COLORECTAL CA SCREEN DOC REV: CPT | Performed by: PHYSICIAN ASSISTANT

## 2021-10-14 SDOH — ECONOMIC STABILITY: TRANSPORTATION INSECURITY
IN THE PAST 12 MONTHS, HAS LACK OF TRANSPORTATION KEPT YOU FROM MEETINGS, WORK, OR FROM GETTING THINGS NEEDED FOR DAILY LIVING?: NO

## 2021-10-14 SDOH — ECONOMIC STABILITY: FOOD INSECURITY: WITHIN THE PAST 12 MONTHS, YOU WORRIED THAT YOUR FOOD WOULD RUN OUT BEFORE YOU GOT MONEY TO BUY MORE.: NEVER TRUE

## 2021-10-14 SDOH — ECONOMIC STABILITY: FOOD INSECURITY: WITHIN THE PAST 12 MONTHS, THE FOOD YOU BOUGHT JUST DIDN'T LAST AND YOU DIDN'T HAVE MONEY TO GET MORE.: NEVER TRUE

## 2021-10-14 SDOH — ECONOMIC STABILITY: TRANSPORTATION INSECURITY
IN THE PAST 12 MONTHS, HAS THE LACK OF TRANSPORTATION KEPT YOU FROM MEDICAL APPOINTMENTS OR FROM GETTING MEDICATIONS?: NO

## 2021-10-14 ASSESSMENT — ENCOUNTER SYMPTOMS
SHORTNESS OF BREATH: 1
NAUSEA: 0
COUGH: 1
DIARRHEA: 0
CHEST TIGHTNESS: 1
SINUS PAIN: 0
CONSTIPATION: 0
ABDOMINAL PAIN: 0
VOMITING: 0
RHINORRHEA: 0
BACK PAIN: 0

## 2021-10-14 ASSESSMENT — SOCIAL DETERMINANTS OF HEALTH (SDOH): HOW HARD IS IT FOR YOU TO PAY FOR THE VERY BASICS LIKE FOOD, HOUSING, MEDICAL CARE, AND HEATING?: NOT VERY HARD

## 2021-10-14 NOTE — PROGRESS NOTES
[address] PRIMARY CARE  [facility] [address] [facility] [address]  [address] [address]  Dept: [phone]  Dept Fax: [phone]    Krystal Martinez is a 80 y.o. female who presents today for her medical conditions/complaints as noted below. Chief Complaint   Patient presents with    ED Follow-up     COPD exacerbation, seen at [facility] on 10/03/2021 given Prednisone 40 mg no relief having chest tightness       HPI:     Patient presents to the office for ED follow-up. She was evaluated at [facility] ED about 10 days ago for COPD exacerbation. She was discharged with prednisone. Chest x-ray at the time revealed known right sided lobectomy and clear left lung. She declined any significant work-up at the ED due to cost.  Patient has longstanding history of COPD and asthma. She has history of lung cancer and is status post lobectomy on the right. She is not currently following with pulmonology as they moved locations. She is not currently on controller inhaler. She is using albuterol multiple times per week. Today, primary concern is recurrent chest tightness, shortness of breath, cough. She states the prednisone significantly improved symptoms temporarily. However as soon as she stops symptoms return. At this time she is only interested in working up her lungs. She denies any chest pain, radicular pain, numbness, tingling, lightheadedness, dizziness, syncope. BP stable. Weight stable.           Hemoglobin A1C (%)   Date Value   2012 5.5             ( goal A1C is < 7)   No results found for: LABMICR  LDL Cholesterol (mg/dL)   Date Value   10/28/2020 133 (H)   2012 150 (H)       (goal LDL is <100)   AST (U/L)   Date Value   10/28/2020 17     ALT (U/L)   Date Value   10/28/2020 12     BUN (mg/dL)   Date Value   10/28/2020 21     BP Readings from Last 3 Encounters:   10/14/21 122/82   10/03/21 (!) 156/87   21 94/70          (goal 120/80)    Past Medical History:   Diagnosis Date    Asthma     COPD (chronic obstructive pulmonary disease) ([facility]     ESBL (extended spectrum beta-lactamase) producing bacteria infection 2018    E. Coli urine    Hypothyroid     Seizures (HCC)       Past Surgical History:   Procedure Laterality Date    BREAST CYST EXCISION Bilateral     fibiritic    CHOLECYSTECTOMY      LUNG REMOVAL, TOTAL Right     OVARIAN CYST REMOVAL Bilateral     TONSILLECTOMY      TUBAL LIGATION         History reviewed. No pertinent family history. Social History     Tobacco Use    Smoking status: Former Smoker     Packs/day: 0.50     Years: 23.00     Pack years: 11.50     Types: Cigarettes     Quit date: 10/7/1995     Years since quittin.0    Smokeless tobacco: Never Used   Substance Use Topics    Alcohol use: No     Alcohol/week: 0.0 standard drinks      Current Outpatient Medications   Medication Sig Dispense Refill    Budeson-Glycopyrrol-Formoterol 160-9-4.8 MCG/ACT AERO Inhale 1 puff into the lungs 2 times daily 1 each 0    EUTHYROX 125 MCG tablet Take 1 tablet by mouth once daily 30 tablet 2    albuterol (PROVENTIL) (2.5 MG/3ML) 0.083% nebulizer solution Take 3 mLs by nebulization every 6 hours as needed for Wheezing 120 each 3    meloxicam (MOBIC) 15 MG tablet Take 1 tablet by mouth daily as needed for Pain 30 tablet 0    albuterol sulfate HFA (PROAIR HFA) 108 (90 Base) MCG/ACT inhaler Inhale 2 puffs into the lungs every 6 hours as needed for Wheezing 1 Inhaler 3    gabapentin (NEURONTIN) 100 MG capsule Take 1 capsule by mouth nightly for 30 days.  30 capsule 1     Current Facility-Administered Medications   Medication Dose Route Frequency Provider Last Rate Last Admin    albuterol (ACCUNEB) nebulizer solution 0.63 mg  0.63 mg Nebulization Once Javad Rollins MD        methylPREDNISolone acetate (DEPO-MEDROL) injection 80 mg  80 mg IntraMUSCular Once Javad Rollins MD        methylPREDNISolone acetate (DEPO-MEDROL) injection 40 mg  40 mg IntraMUSCular Once Candelaria Meza MD         Allergies   Allergen Reactions    Benadryl [Diphenhydramine]     Codeine     Dilantin  [Phenytoin Sodium Extended]     Dye [Iodides]     Lorazepam     Prochlorperazine Edisylate     Sulfa Antibiotics     Sulfamethoxazole-Trimethoprim     Zofran [Ondansetron Hcl] Nausea And Vomiting       Health Maintenance   Topic Date Due    Shingles Vaccine (1 of 2) Never done   [employer] Visit (AWV)  07/28/2021    Flu vaccine (1) 09/01/2021    Breast cancer screen  11/17/2022    Colon cancer screen fecal DNA test (Cologuard)  08/01/2023    Lipid screen  10/28/2025    DTaP/Tdap/Td vaccine (2 - Td or Tdap) 03/25/2027    DEXA (modify frequency per FRAX score)  Completed    Pneumococcal 65+ years Vaccine  Completed    COVID-19 Vaccine  Completed    Hepatitis C screen  Completed    Hepatitis A vaccine  Aged Out    Hepatitis B vaccine  Aged Out    Hib vaccine  Aged Out    Meningococcal (ACWY) vaccine  Aged Out       Subjective:      Review of Systems   Constitutional: Negative for chills, fatigue and fever. HENT: Negative for congestion, rhinorrhea and sinus pain. Respiratory: Positive for cough, chest tightness and shortness of breath. Cardiovascular: Negative for chest pain and leg swelling. Gastrointestinal: Negative for abdominal pain, constipation, diarrhea, nausea and vomiting. Genitourinary: Negative for difficulty urinating, frequency and urgency. Musculoskeletal: Negative for arthralgias, back pain and myalgias. Neurological: Negative for dizziness and headaches. Psychiatric/Behavioral: Negative for confusion, dysphoric mood and sleep disturbance. The patient is not nervous/anxious. All other systems reviewed and are negative. Objective:     Physical Exam  Vitals and nursing note reviewed. Constitutional:       General: She is not in acute distress.      Appearance: Normal appearance. She is normal weight. HENT:      Head: Normocephalic. Mouth/Throat:      Mouth: Mucous membranes are moist.   Eyes:      Extraocular Movements: Extraocular movements intact. Conjunctiva/sclera: Conjunctivae normal.      Pupils: Pupils are equal, round, and reactive to light. Cardiovascular:      Rate and Rhythm: Normal rate and regular rhythm. Pulses: Normal pulses. Heart sounds: Normal heart sounds. Pulmonary:      Effort: Pulmonary effort is normal.      Breath sounds: Normal breath sounds. Comments: Diminished breath sounds on the right status post lobectomy. Left lung sounds clear without wheezing. Abdominal:      General: Abdomen is flat. Bowel sounds are normal.      Palpations: Abdomen is soft. Tenderness: There is no abdominal tenderness. There is no right CVA tenderness, left CVA tenderness, guarding or rebound. Musculoskeletal:      Cervical back: Normal range of motion. Right lower leg: No edema. Left lower leg: No edema. Lymphadenopathy:      Cervical: No cervical adenopathy. Skin:     General: Skin is warm. Capillary Refill: Capillary refill takes less than 2 seconds. Neurological:      General: No focal deficit present. Mental Status: She is alert and oriented to person, place, and time. Psychiatric:         Mood and Affect: Mood normal.         Behavior: Behavior normal.       /82 (Site: Left Upper Arm, Position: Sitting, Cuff Size: Medium Adult)   Pulse 118   Resp 16   Ht 5' 2\" (1.575 m)   Wt 146 lb 9.6 oz (66.5 kg)   SpO2 94%   Breastfeeding No   BMI 26.81 kg/m²     Assessment:       ICD-10-CM    1. Mild persistent asthma without complication  U23.55 Tiffanie Fay MD, Pulmonology, [place]   2. Simple chronic [facility] ([facility]  J41.0 Budeson-Glycopyrrol-Formoterol 160-9-4.8 MCG/ACT Theresa Diaz MD, Pulmonology, [place]   3.  Malignant neoplasm of right lung, unspecified part of lung ([facility]  C34.91    4. S/P lobectomy of lung  Z90.2 Lambert Hicks MD, Pulmonology, [place]            Plan:       1-4. Patient with uncontrolled asthma/COPD. She has significant past medical history including lung cancer status post lobectomy. She will be started on registry for controller inhaler. She is agreeable to referral to pulmonology for further evaluation and management. She declines any other further work-up at this time. She is to follow-up with PCP as previously scheduled. Return if symptoms worsen or fail to improve, for with [facility] for COPD follow-up. Orders Placed This Encounter   Procedures   Lambert Hicks MD, Pulmonology, [facility]     Referral Priority:   Routine     Referral Type:   Eval and Treat     Referral Reason:   Specialty Services Required     Referred to Provider:   Joseluis Gabriel MD     Requested Specialty:   Pulmonology     Number of Visits Requested:   1         Patient given educational materials - see patient instructions. Discussed use, benefit, and side effects of prescribedmedications. All patient questions answered. Pt voiced understanding. Reviewed health maintenance. Instructed to continue current medications, diet and exercise. Patient agreed with treatment plan. Follow up as directed.         Electronically signed by Terra Laughlin PA-C on 10/14/2021 at 2:31 PM

## 2021-10-28 ENCOUNTER — OFFICE VISIT (OUTPATIENT)
Dept: PRIMARY CARE CLINIC | Age: 71
End: 2021-10-28
Payer: MEDICARE

## 2021-10-28 VITALS
SYSTOLIC BLOOD PRESSURE: 134 MMHG | WEIGHT: 150 LBS | RESPIRATION RATE: 14 BRPM | DIASTOLIC BLOOD PRESSURE: 80 MMHG | OXYGEN SATURATION: 95 % | HEART RATE: 68 BPM | BODY MASS INDEX: 27.6 KG/M2 | HEIGHT: 62 IN

## 2021-10-28 DIAGNOSIS — Z23 NEED FOR INFLUENZA VACCINATION: ICD-10-CM

## 2021-10-28 DIAGNOSIS — Z00.00 ROUTINE GENERAL MEDICAL EXAMINATION AT A HEALTH CARE FACILITY: Primary | ICD-10-CM

## 2021-10-28 DIAGNOSIS — J45.901 ASTHMA EXACERBATION, MILD: ICD-10-CM

## 2021-10-28 PROCEDURE — 4040F PNEUMOC VAC/ADMIN/RCVD: CPT | Performed by: NURSE PRACTITIONER

## 2021-10-28 PROCEDURE — 1123F ACP DISCUSS/DSCN MKR DOCD: CPT | Performed by: NURSE PRACTITIONER

## 2021-10-28 PROCEDURE — G0439 PPPS, SUBSEQ VISIT: HCPCS | Performed by: NURSE PRACTITIONER

## 2021-10-28 PROCEDURE — G0008 ADMIN INFLUENZA VIRUS VAC: HCPCS | Performed by: NURSE PRACTITIONER

## 2021-10-28 PROCEDURE — 90694 VACC AIIV4 NO PRSRV 0.5ML IM: CPT | Performed by: NURSE PRACTITIONER

## 2021-10-28 PROCEDURE — G8484 FLU IMMUNIZE NO ADMIN: HCPCS | Performed by: NURSE PRACTITIONER

## 2021-10-28 PROCEDURE — 3017F COLORECTAL CA SCREEN DOC REV: CPT | Performed by: NURSE PRACTITIONER

## 2021-10-28 RX ORDER — TIOTROPIUM BROMIDE 18 UG/1
18 CAPSULE ORAL; RESPIRATORY (INHALATION) DAILY
Qty: 90 CAPSULE | Refills: 5 | Status: SHIPPED | OUTPATIENT
Start: 2021-10-28 | End: 2022-09-20 | Stop reason: SDUPTHER

## 2021-10-28 RX ORDER — CEFDINIR 300 MG/1
CAPSULE ORAL
COMMUNITY
Start: 2021-10-21 | End: 2022-06-13

## 2021-10-28 ASSESSMENT — PATIENT HEALTH QUESTIONNAIRE - PHQ9
1. LITTLE INTEREST OR PLEASURE IN DOING THINGS: 0
SUM OF ALL RESPONSES TO PHQ9 QUESTIONS 1 & 2: 0
SUM OF ALL RESPONSES TO PHQ QUESTIONS 1-9: 0
SUM OF ALL RESPONSES TO PHQ QUESTIONS 1-9: 0
2. FEELING DOWN, DEPRESSED OR HOPELESS: 0
2. FEELING DOWN, DEPRESSED OR HOPELESS: 0
1. LITTLE INTEREST OR PLEASURE IN DOING THINGS: 0
SUM OF ALL RESPONSES TO PHQ QUESTIONS 1-9: 0
SUM OF ALL RESPONSES TO PHQ9 QUESTIONS 1 & 2: 0
SUM OF ALL RESPONSES TO PHQ QUESTIONS 1-9: 0

## 2021-10-28 ASSESSMENT — LIFESTYLE VARIABLES
AUDIT-C TOTAL SCORE: INCOMPLETE
HOW OFTEN DO YOU HAVE A DRINK CONTAINING ALCOHOL: 0
HOW OFTEN DO YOU HAVE A DRINK CONTAINING ALCOHOL: NEVER
AUDIT TOTAL SCORE: INCOMPLETE

## 2021-10-28 NOTE — PROGRESS NOTES
Medicare Annual Wellness Visit  Name: Sanjiv Redman Date: 10/28/2021   MRN: [id number] Sex: Female   Age: 80 y.o. Ethnicity: Non- / Non    : 1950 Race: White (non-)      Kyree Khan is here for 2 Week Follow-Up (COPD Exacberation ) and Medicare AWV    Screenings for behavioral, psychosocial and functional/safety risks, and cognitive dysfunction are all negative except as indicated below. These results, as well as other patient data from the [address] form, are documented in Flowsheets linked to this Encounter. Allergies   Allergen Reactions    Benactyzine     Benadryl [Diphenhydramine]     Ciprofloxacin     Codeine     Dilantin  [Phenytoin Sodium Extended]     Dye [Iodides]     Lorazepam     Prochlorperazine Edisylate     Sulfa Antibiotics     Sulfamethoxazole-Trimethoprim     Zofran [Ondansetron Hcl] Nausea And Vomiting         Prior to Visit Medications    Medication Sig Taking? Authorizing Provider   cefdinir (OMNICEF) 300 MG capsule  Yes Historical Provider, MD   tiotropium (SPIRIVA HANDIHALER) 18 MCG inhalation capsule Inhale 1 capsule into the lungs daily Yes SORAYA Montgomery CNP   EUTHYROX 125 MCG tablet Take 1 tablet by mouth once daily Yes SORAYA Montgomery CNP   albuterol (PROVENTIL) (2.5 MG/3ML) 0.083% nebulizer solution Take 3 mLs by nebulization every 6 hours as needed for Wheezing Yes SORAYA Montgomery CNP   meloxicam (MOBIC) 15 MG tablet Take 1 tablet by mouth daily as needed for Pain Yes SORAYA Montgomery CNP   albuterol sulfate HFA (PROAIR HFA) 108 (90 Base) MCG/ACT inhaler Inhale 2 puffs into the lungs every 6 hours as needed for Wheezing Yes SORAYA Montgomery CNP   gabapentin (NEURONTIN) 100 MG capsule Take 1 capsule by mouth nightly for 30 days.   SORAYA Menon CNP         Past Medical History:   Diagnosis Date    Asthma     [facility]     ESBL (extended spectrum beta-lactamase) producing bacteria infection 07/24/2018    E. Coli urine    Hypothyroid     Seizures (HCC)        Past Surgical History:   Procedure Laterality Date    BREAST CYST EXCISION Bilateral     fibiritic    CHOLECYSTECTOMY      LUNG REMOVAL, TOTAL Right     OVARIAN CYST REMOVAL Bilateral     TONSILLECTOMY      TUBAL LIGATION         No family history on file. CareTeam (Including outside providers/suppliers regularly involved in providing care):   Patient Care Team:  SORAYA Adams CNP as PCP - General (Nurse Practitioner)  SORAYA Adams CNP as PCP - [facility] Empaneled Provider    Wt Readings from Last 3 Encounters:   10/28/21 150 lb (68 kg)   10/14/21 146 lb 9.6 oz (66.5 kg)   10/03/21 147 lb (66.7 kg)     Vitals:    10/28/21 1504   BP: 134/80   Pulse: 68   Resp: 14   SpO2: 95%   Weight: 150 lb (68 kg)   Height: 5' 2\" (1.575 m)     Body mass index is 27.44 kg/m². Based upon direct observation of the patient, evaluation of cognition reveals recent and remote memory intact. Pulmonary/Chest: clear to auscultation bilaterally- no wheezes, rales or rhonchi, normal air movement, no respiratory distress  Cardiovascular- S1,S2, normal heart sounds, no murmur, rub or gallops    Patient's complete Health Risk Assessment and screening values have been reviewed and are found in Flowsheets. The following problems were reviewed today and where indicated follow up appointments were made and/or referrals ordered. Positive Risk Factor Screenings with Interventions:            General Health and ACP:  General  In general, how would you say your health is?: Fair  In the past 7 days, have you experienced any of the following?  New or Increased Pain, New or Increased Fatigue, Loneliness, Social Isolation, Stress or Anger?: None of These  Do you get the social and emotional support that you need?: Yes  Do you have a Living Will?: (!) No  Advance Directives Power of  Living Will ACP-Advance Directive ACP-Power of     Not on File Not on File Not on File Not on File      General Health Risk Interventions:  · no living will, declines assistance for this    Health Habits/Nutrition:  Health Habits/Nutrition  Do you exercise for at least 20 minutes 2-3 times per week?: (!) No  Have you lost any weight without trying in the past 3 months?: No  Do you eat only one meal per day?: No  Have you seen the dentist within the past year?: Yes  Body mass index: (!) 27.43      Declines need, limited by chronic conditions, remains active   Personalized Preventive Plan   Current Health Maintenance Status  Immunization History   Administered Date(s) Administered    COVID-19, [place] Wendy, Primary or Immunocompromised, PF, 100mcg/0.5mL 02/26/2021, 03/26/2021    Influenza A ([id number]) Vaccine PF IM 11/24/2009    Influenza Vaccine, unspecified formulation 10/05/2017    Influenza Virus Vaccine 10/05/2017    Influenza Whole 10/30/2015    Influenza, High Dose (Fluzone 65 yrs and older) 10/16/2018    Influenza, Intradermal, Preservative free 10/21/2013    Influenza, Quadv, adjuvanted, 65 yrs +, IM, PF (Fluad) 09/24/2020, 10/28/2021    Influenza, Triv, inactivated, subunit, adjuvanted, IM (Fluad 65 yrs and older) 10/19/2019    Pneumococcal Conjugate 13-valent ([id number]) 03/14/2017    Pneumococcal Polysaccharide ([id number]) 07/27/2020    Tdap (Boostrix, Adacel) 03/25/2017        Health Maintenance   Topic Date Due    Shingles Vaccine (1 of 2) Never done   [employer] Visit (AWV)  07/28/2021    Breast cancer screen  11/17/2022    Colon cancer screen fecal DNA test (Cologuard)  08/01/2023    Lipid screen  10/28/2025    DTaP/Tdap/Td vaccine (2 - Td or Tdap) 03/25/2027    DEXA (modify frequency per FRAX score)  Completed    Flu vaccine  Completed    Pneumococcal 65+ years Vaccine  Completed    COVID-19 Vaccine  Completed    Hepatitis C screen  Completed    Hepatitis A vaccine  Aged Out    Hepatitis B vaccine  Aged Out    Hib vaccine  Aged Out    Meningococcal (ACWY) vaccine  Aged Out     Recommendations for [employer] Due: see orders and patient instructions/AVS.  . Recommended screening schedule for the next 5-10 years is provided to the patient in written form: see Patient Instructions/AVS.    Estephania Lopez was seen today for 2 week follow-up and medicare awv. Diagnoses and all orders for this visit:    Routine general medical examination at a health care facility    Asthma exacerbation, mild  Recent asthma exacerbation after camping for 2 weekends, weather was rainy and they had AC on inside, she feels that this is likely trigger. Improved with steroid and cefdinir. Would like Symbicort Rx to use mail order pharmacy that is cheaper than local pharmacy. Denies any cough or shortness of breath presently, endurance is improved and fatigue is resolved, no fever or chills. Has pulmonology appointment scheduled for COPD and history of lung cancer/status post lobectomy. -     tiotropium (SPIRIVA HANDIHALER) 18 MCG inhalation capsule;  Inhale 1 capsule into the lungs daily    Need for influenza vaccination  -     INFLUENZA, QUADV, ADJUVANTED, 65 YRS =, IM, PF, PREFILL SYR, 0.5ML (FLUAD)

## 2021-10-28 NOTE — PATIENT INSTRUCTIONS
Personalized Preventive Plan for Chel Cobb - 10/28/2021  Medicare offers a range of preventive health benefits. Some of the tests and screenings are paid in full while other may be subject to a deductible, co-insurance, and/or copay. Some of these benefits include a comprehensive review of your medical history including lifestyle, illnesses that may run in your family, and various assessments and screenings as appropriate. After reviewing your medical record and screening and assessments performed today your provider may have ordered immunizations, labs, imaging, and/or referrals for you. A list of these orders (if applicable) as well as your Preventive Care list are included within your After Visit Summary for your review. Other Preventive Recommendations:    · A preventive eye exam performed by an eye specialist is recommended every 1-2 years to screen for glaucoma; cataracts, macular degeneration, and other eye disorders. · A preventive dental visit is recommended every 6 months. · Try to get at least 150 minutes of exercise per week or 10,000 steps per day on a pedometer . · Order or download the FREE \"Exercise & Physical Activity: Your Everyday Guide\" from [employer]. Call 3-576.843.2463 or search [employer] online. · You need 7988-5392 mg of calcium and 5614-5943 IU of vitamin D per day. It is possible to meet your calcium requirement with diet alone, but a vitamin D supplement is usually necessary to meet this goal.  · When exposed to the sun, use a sunscreen that protects against both UVA and UVB radiation with an SPF of 30 or greater. Reapply every 2 to 3 hours or after sweating, drying off with a towel, or swimming. · Always wear a seat belt when traveling in a car. Always wear a helmet when riding a bicycle or motorcycle.

## 2021-11-11 ENCOUNTER — OFFICE VISIT (OUTPATIENT)
Dept: PULMONOLOGY | Age: 71
End: 2021-11-11
Payer: MEDICARE

## 2021-11-11 VITALS
HEIGHT: 61 IN | SYSTOLIC BLOOD PRESSURE: 132 MMHG | HEART RATE: 114 BPM | RESPIRATION RATE: 17 BRPM | OXYGEN SATURATION: 93 % | DIASTOLIC BLOOD PRESSURE: 69 MMHG | TEMPERATURE: 97.4 F | WEIGHT: 142 LBS | BODY MASS INDEX: 26.81 KG/M2

## 2021-11-11 DIAGNOSIS — Z98.890 H/O PNEUMONECTOMY: ICD-10-CM

## 2021-11-11 DIAGNOSIS — J44.9 CHRONIC OBSTRUCTIVE PULMONARY DISEASE, UNSPECIFIED COPD TYPE (HCC): Primary | ICD-10-CM

## 2021-11-11 DIAGNOSIS — Z90.2 H/O PNEUMONECTOMY: ICD-10-CM

## 2021-11-11 DIAGNOSIS — J98.4 RESTRICTIVE LUNG DISEASE: ICD-10-CM

## 2021-11-11 PROCEDURE — 99204 OFFICE O/P NEW MOD 45 MIN: CPT | Performed by: INTERNAL MEDICINE

## 2021-11-11 PROCEDURE — G8417 CALC BMI ABV UP PARAM F/U: HCPCS | Performed by: INTERNAL MEDICINE

## 2021-11-11 PROCEDURE — 4040F PNEUMOC VAC/ADMIN/RCVD: CPT | Performed by: INTERNAL MEDICINE

## 2021-11-11 PROCEDURE — 1123F ACP DISCUSS/DSCN MKR DOCD: CPT | Performed by: INTERNAL MEDICINE

## 2021-11-11 PROCEDURE — 3023F SPIROM DOC REV: CPT | Performed by: INTERNAL MEDICINE

## 2021-11-11 PROCEDURE — 3017F COLORECTAL CA SCREEN DOC REV: CPT | Performed by: INTERNAL MEDICINE

## 2021-11-11 PROCEDURE — G8427 DOCREV CUR MEDS BY ELIG CLIN: HCPCS | Performed by: INTERNAL MEDICINE

## 2021-11-11 PROCEDURE — G8484 FLU IMMUNIZE NO ADMIN: HCPCS | Performed by: INTERNAL MEDICINE

## 2021-11-11 PROCEDURE — G8399 PT W/DXA RESULTS DOCUMENT: HCPCS | Performed by: INTERNAL MEDICINE

## 2021-11-11 PROCEDURE — 1090F PRES/ABSN URINE INCON ASSESS: CPT | Performed by: INTERNAL MEDICINE

## 2021-11-11 PROCEDURE — G8926 SPIRO NO PERF OR DOC: HCPCS | Performed by: INTERNAL MEDICINE

## 2021-11-11 PROCEDURE — 1036F TOBACCO NON-USER: CPT | Performed by: INTERNAL MEDICINE

## 2021-11-11 NOTE — PROGRESS NOTES
fracture is 11.9% and for hip fracture is 2.1%.  S/P lobectomy of lung 1/27/2014    Seizures ([address]          Family History:   History reviewed. No pertinent family history. SURGICAL HISTORY:   Past Surgical History:   Procedure Laterality Date    BREAST CYST EXCISION Bilateral     fibiritic    CHOLECYSTECTOMY      LUNG REMOVAL, TOTAL Right     OVARIAN CYST REMOVAL Bilateral     TONSILLECTOMY      TUBAL LIGATION             SOCIAL AND OCCUPATIONAL HEALTH:      There  no history of TB or TB exposure. There  no asbestos or silica dust exposure. The patient reports  no coal, foundry, quarry or [employer] exposure. Travel history reveals no. There  no history of recreational or IV drug use. There  no hot tube exposure. Pets  no    Occupational history nurses aid     TOBACCO:   reports that she quit smoking about 26 years ago. Her smoking use included cigarettes. She has a 11.50 pack-year smoking history. She has never used smokeless tobacco.  ETOH:   reports no history of alcohol use.   Immunization History   Administered Date(s) Administered    COVID-19, Moderna, Primary or Immunocompromised, PF, 100mcg/0.5mL 02/26/2021, 03/26/2021    Influenza A ([id number]) Vaccine PF IM 11/24/2009    Influenza Vaccine, unspecified formulation 10/05/2017    Influenza Virus Vaccine 10/05/2017    Influenza Whole 10/30/2015    Influenza, High Dose (Fluzone 65 yrs and older) 10/16/2018    Influenza, Intradermal, Preservative free 10/21/2013    Influenza, Quadv, adjuvanted, 65 yrs +, IM, PF (Fluad) 09/24/2020, 10/28/2021    Influenza, Triv, inactivated, subunit, adjuvanted, IM (Fluad 65 yrs and older) 10/19/2019    Pneumococcal Conjugate 13-valent ([id number]) 03/14/2017    Pneumococcal Polysaccharide ([id number]) 07/27/2020    Tdap (Boostrix, Adacel) 03/25/2017        ALLERGIES:      Allergies   Allergen Reactions    Benactyzine     Benadryl [Diphenhydramine]     Ciprofloxacin     Codeine     Dilantin  [Phenytoin Sodium Extended]     Dye [Iodides]     Lorazepam     Prochlorperazine Edisylate     Sulfa Antibiotics     Sulfamethoxazole-Trimethoprim     Zofran [Ondansetron Hcl] Nausea And Vomiting         Home Meds:   Prior to Admission medications    Medication Sig Start Date End Date Taking? Authorizing Provider   cefdinir (OMNICEF) 300 MG capsule  10/21/21  Yes Historical Provider, MD   tiotropium (Katelyn Balo) 18 MCG inhalation capsule Inhale 1 capsule into the lungs daily 10/28/21  Yes SORAYA Montgomery CNP   EUTHYROX 125 MCG tablet Take 1 tablet by mouth once daily 9/1/21  Yes SORAYA Montgomery CNP   albuterol (PROVENTIL) (2.5 MG/3ML) 0.083% nebulizer solution Take 3 mLs by nebulization every 6 hours as needed for Wheezing 8/16/21  Yes SORAYA Montgomery CNP   meloxicam (MOBIC) 15 MG tablet Take 1 tablet by mouth daily as needed for Pain 10/29/20  Yes SORAYA Montgomery CNP   albuterol sulfate HFA (PROAIR HFA) 108 (90 Base) MCG/ACT inhaler Inhale 2 puffs into the lungs every 6 hours as needed for Wheezing 7/27/20  Yes SORAYA Montgomery CNP   gabapentin (NEURONTIN) 100 MG capsule Take 1 capsule by mouth nightly for 30 days.  4/28/21 5/28/21  SORAYA Avalos CNP             REVIEW OF SYSTEMS:  CONSTITUTIONAL:  negative for  fevers, chills, sweats, fatigue, malaise, anorexia and weight loss  EYES:  negative for  double vision, blurred vision, dry eyes, eye discharge and redness  HEENT:  negative for  hearing loss, tinnitus, ear drainage, earaches and nasal congestion  RESPIRATORY:  See hpi  CARDIOVASCULAR:  negative for  chest pain palpitations, orthopnea, PND  GASTROINTESTINAL:  negative for nausea, vomiting, change in bowel habits, diarrhea, constipation, abdominal pain, pruritus, abdominal mass and abdominal distention      HEMATOLOGIC/LYMPHATIC:  negative for easy bruising, bleeding, lymphadenopathy, petechiae and swelling/edema  ALLERGIC/IMMUNOLOGIC:  negative for recurrent infections, urticaria and drug reactions  ENDOCRINE:  negative for heat intolerance, cold intolerance, tremor, weight changes and change in bowel habits  MUSCULOSKELETAL:  negative for  myalgias, arthralgias, pain, joint swelling, stiff joints and decreased range of motion  NEUROLOGICAL:  negative for headaches, dizziness, seizures, memory problems, speech problems, visual disturbance and coordination problems  BEHAVIOR/PSYCH:  negative for poor appetite, increased appetite, decreased sleep, increased sleep, decreased energy level, increased energy level and poor concentration  Skin no rash no dermatitis    Vitals:  /69 (Site: Right Upper Arm, Position: Sitting)   Pulse 114   Temp 97.4 °F (36.3 °C) (Temporal)   Resp 17   Ht 5' 1\" (1.549 m)   Wt 142 lb (64.4 kg)   SpO2 93%   BMI 26.83 kg/m²     PHYSICAL EXAM:  General Appearance:    Alert, cooperative, no distress, appears stated age   Head:    Normocephalic, without obvious abnormality, atraumatic      Eyes:    PERRL, conjunctiva/corneas clear, EOM's intact   Ears:    Normal TM's and external ear canals, both ears   Nose:   Nares normal, septum midline, mucosa normal, no drainage        or sinus tenderness   Throat:   Lips, mucosa, and tongue normal; teeth and gums normal   Neck:   Supple, symmetrical, trachea midline, no adenopathy;     thyroid:  no enlargement/tenderness/nodules; no carotid    bruit no JVP , no HJR   Back:     Symmetric, no curvature, ROM normal, no CVA tenderness   Lungs:       Prolonged expiration on the left lung.   No rales rhonchi no dullness   Chest Wall:    No tenderness or deformity      Heart:    Regular rate and rhythm, S1 and S2 normal, no murmur, rub        or gallop no rvh                           Abdomen:                                                 Pulses:                              Skin:                  Lymph nodes:                    Neurologic: Soft, non-tender, bowel sounds active all four quadrants,     no masses, no organomegaly         2+ and symmetric all extremities     Skin color, texture, turgor normal, no rashes or lesions       Cervical, supraclavicular not enlarged or matted or tender      CNII-XII intact, normal strength 5/5 . Sensation grossly normal  and reflexes normal 2+  throughout     Clubbing No  Lower ext edema No1+   [] , 2 +  [] , 3+   []  Upper ext edema No               IMPRESSION:     1. Status post right pneumonectomy 2. Secondary Hyperexpansion left lung. 3. Right pleural calcification or other mineral such as talc. 4. No evidence of recurrent or metastatic malignancy. Report Electronically signed by Lisa Galicia M.D. on 4/11/2014 2:40 PM Transcribed by: [facility] on Apr 11 2014  2:42P Read by:  Denisa Thayer M.D.  [id number] on Apr 11 2014  2:42P Electronically Signed by:  DR. Denisa Thayer M.D. on:  Apr 11 2014   2:42P   Acuity: Acute   Type of Exam: Initial       FINDINGS:   Stable findings compatible with right pneumonectomy with volume loss on the   right and rightward mediastinal shift and complete opacification of right   hemithorax. Left lung remains clear without focal consolidation, pleural   effusion or pulmonary edema. No pneumothoraces. Limited evaluation of   cardiac and mediastinal silhouettes secondary to right hemithorax findings   but cardiac and mediastinal silhouettes appear grossly stable. No acute bony   abnormalities are seen. Impression   Stable exam without evidence for acute cardiopulmonary process. IMPRESSION:     Diagnosis Orders   1. Chronic obstructive pulmonary disease, unspecified COPD type [facility])  [address]    Full PFT Study With Bronchodilator   2. H/O pneumonectomy right   [facility]    Full PFT Study With Bronchodilator   3.  Restrictive lung disease          :                PLAN:      History of right pneumonectomy post chemo and radiation for lung cancer. Chest x-ray 10/3/2021 does not show acute changes in the left lung, no nodule. Will obtain complete PFT. Start pulmonary rehab at [address] Use albuterol as needed. I hope this updates you on my evaluation and clinical thinking. Thank you for allowing me to participate in his care. Sincerely,      Electronically signed by Chanelle Lemus MD on 11/12/2021 at 7:55 PM       Please note that this chart was generated using voice recognition Dragon dictation software. Although every effort was made to ensure the accuracy of this automated transcription, some errors in transcription may have occurred.

## 2021-11-24 ENCOUNTER — HOSPITAL ENCOUNTER (OUTPATIENT)
Dept: PULMONOLOGY | Age: 71
Discharge: HOME | End: 2021-11-24
Payer: MEDICARE

## 2021-11-24 DIAGNOSIS — J44.9 CHRONIC OBSTRUCTIVE PULMONARY DISEASE, UNSPECIFIED COPD TYPE (HCC): ICD-10-CM

## 2021-11-24 DIAGNOSIS — Z98.890 H/O PNEUMONECTOMY: ICD-10-CM

## 2021-11-24 DIAGNOSIS — Z90.2 H/O PNEUMONECTOMY: ICD-10-CM

## 2021-11-24 LAB
DLCO %PRED: NORMAL
DLCO PRED: NORMAL
DLCO/VA %PRED: NORMAL
DLCO/VA PRED: NORMAL
DLCO/VA: NORMAL
DLCO: NORMAL
EXPIRATORY TIME: NORMAL
FEF 25-75% %PRED-PRE: NORMAL
FEF 25-75% PRED: NORMAL
FEF 25-75%-PRE: NORMAL
FEV1 %PRED-PRE: NORMAL
FEV1 PRED: NORMAL
FEV1/FVC %PRED-PRE: NORMAL
FEV1/FVC PRED: NORMAL
FEV1/FVC: NORMAL
FEV1: NORMAL
FVC %PRED-PRE: NORMAL
FVC PRED: NORMAL
FVC: NORMAL
GAW %PRED: NORMAL
GAW PRED: NORMAL
GAW: NORMAL
IC %PRED: NORMAL
IC PRED: NORMAL
IC: NORMAL
MVV %PRED-PRE: NORMAL
MVV PRED: NORMAL
MVV-PRE: NORMAL
PEF %PRED-PRE: NORMAL
PEF PRED: NORMAL
PEF-PRE: NORMAL
RAW %PRED: NORMAL
RAW PRED: NORMAL
RAW: NORMAL
RV %PRED: NORMAL
RV PRED: NORMAL
RV: NORMAL
SVC %PRED: NORMAL
SVC PRED: NORMAL
SVC: NORMAL
TLC %PRED: NORMAL
TLC PRED: NORMAL
TLC: NORMAL
VA %PRED: NORMAL
VA PRED: NORMAL
VA: NORMAL
VTG %PRED: NORMAL
VTG PRED: NORMAL
VTG: NORMAL

## 2021-11-24 PROCEDURE — 94726 PLETHYSMOGRAPHY LUNG VOLUMES: CPT

## 2021-11-24 PROCEDURE — 94664 DEMO&/EVAL PT USE INHALER: CPT

## 2021-11-24 PROCEDURE — 94060 EVALUATION OF WHEEZING: CPT

## 2021-11-24 PROCEDURE — 94375 RESPIRATORY FLOW VOLUME LOOP: CPT

## 2021-11-24 PROCEDURE — 94729 DIFFUSING CAPACITY: CPT

## 2021-11-24 NOTE — PROCEDURES
Told him that showed adequate effort tracings. Spirometry and static lung volumes revealed a moderate restrictive defect. The defect by ATS criteria does not improve with bronchodilator therapy. The DLCO normalizes with respect to alveolar volume    In summary, there is a moderate restrictive defect. The presence of a normalization of DLCO suggests that the Restrictive defect may be non Pulmonary related. Clinical correlation recommended.

## 2022-01-12 ENCOUNTER — TELEPHONE (OUTPATIENT)
Dept: PRIMARY CARE CLINIC | Age: 72
End: 2022-01-12

## 2022-01-12 DIAGNOSIS — J01.40 ACUTE NON-RECURRENT PANSINUSITIS: Primary | ICD-10-CM

## 2022-01-12 RX ORDER — AMOXICILLIN AND CLAVULANATE POTASSIUM 875; 125 MG/1; MG/1
1 TABLET, FILM COATED ORAL 2 TIMES DAILY
Qty: 14 TABLET | Refills: 0 | Status: SHIPPED | OUTPATIENT
Start: 2022-01-12 | End: 2022-01-19

## 2022-01-12 NOTE — TELEPHONE ENCOUNTER
Pt stated she is having sinus issues that is causing a headache. She is also having nasal congestion with drainage going down her throat. She stated she has a history of this and when she didn't treat it before, it got into her chest and got worse for her since she has half a lung and COPD. Pt requesting a prescription to be sent into the pharmacy. Please advise.     Preferred pharmacy: [facility] in [place]

## 2022-01-18 ENCOUNTER — HOSPITAL ENCOUNTER (OUTPATIENT)
Age: 72
Setting detail: SPECIMEN
Discharge: HOME | End: 2022-01-18

## 2022-01-18 ENCOUNTER — HOSPITAL ENCOUNTER (OUTPATIENT)
Age: 72
Discharge: HOME | End: 2022-01-20
Payer: MEDICARE

## 2022-01-18 ENCOUNTER — OFFICE VISIT (OUTPATIENT)
Dept: FAMILY MEDICINE CLINIC | Age: 72
End: 2022-01-18
Payer: MEDICARE

## 2022-01-18 ENCOUNTER — HOSPITAL ENCOUNTER (OUTPATIENT)
Dept: GENERAL RADIOLOGY | Age: 72
Discharge: HOME | End: 2022-01-20
Payer: MEDICARE

## 2022-01-18 VITALS
BODY MASS INDEX: 27 KG/M2 | OXYGEN SATURATION: 98 % | HEART RATE: 101 BPM | TEMPERATURE: 97.2 F | HEIGHT: 61 IN | WEIGHT: 143 LBS | SYSTOLIC BLOOD PRESSURE: 120 MMHG | DIASTOLIC BLOOD PRESSURE: 80 MMHG

## 2022-01-18 DIAGNOSIS — Z11.52 ENCOUNTER FOR SCREENING FOR COVID-19: ICD-10-CM

## 2022-01-18 DIAGNOSIS — S46.912A MUSCLE STRAIN OF LEFT UPPER ARM, INITIAL ENCOUNTER: ICD-10-CM

## 2022-01-18 DIAGNOSIS — J01.91 ACUTE RECURRENT SINUSITIS, UNSPECIFIED LOCATION: Primary | ICD-10-CM

## 2022-01-18 PROCEDURE — 1090F PRES/ABSN URINE INCON ASSESS: CPT

## 2022-01-18 PROCEDURE — 3017F COLORECTAL CA SCREEN DOC REV: CPT

## 2022-01-18 PROCEDURE — 99214 OFFICE O/P EST MOD 30 MIN: CPT

## 2022-01-18 PROCEDURE — 4040F PNEUMOC VAC/ADMIN/RCVD: CPT

## 2022-01-18 PROCEDURE — G8484 FLU IMMUNIZE NO ADMIN: HCPCS

## 2022-01-18 PROCEDURE — 73030 X-RAY EXAM OF SHOULDER: CPT

## 2022-01-18 PROCEDURE — 1123F ACP DISCUSS/DSCN MKR DOCD: CPT

## 2022-01-18 PROCEDURE — 1036F TOBACCO NON-USER: CPT

## 2022-01-18 PROCEDURE — G8417 CALC BMI ABV UP PARAM F/U: HCPCS

## 2022-01-18 PROCEDURE — G8399 PT W/DXA RESULTS DOCUMENT: HCPCS

## 2022-01-18 PROCEDURE — G8427 DOCREV CUR MEDS BY ELIG CLIN: HCPCS

## 2022-01-18 RX ORDER — PREDNISONE 20 MG/1
20 TABLET ORAL DAILY
Qty: 5 TABLET | Refills: 0 | Status: SHIPPED | OUTPATIENT
Start: 2022-01-18 | End: 2022-01-23

## 2022-01-18 RX ORDER — CETIRIZINE HYDROCHLORIDE, PSEUDOEPHEDRINE HYDROCHLORIDE 5; 120 MG/1; MG/1
1 TABLET, FILM COATED, EXTENDED RELEASE ORAL DAILY
Qty: 14 TABLET | Refills: 0 | Status: SHIPPED | OUTPATIENT
Start: 2022-01-18 | End: 2022-02-01

## 2022-01-18 RX ORDER — IBUPROFEN 600 MG/1
600 TABLET ORAL 4 TIMES DAILY PRN
Qty: 120 TABLET | Refills: 0 | Status: SHIPPED | OUTPATIENT
Start: 2022-01-18 | End: 2022-04-29 | Stop reason: SDUPTHER

## 2022-01-18 ASSESSMENT — ENCOUNTER SYMPTOMS
COUGH: 1
RHINORRHEA: 1
SINUS COMPLAINT: 1
SORE THROAT: 1
SHORTNESS OF BREATH: 0
CHEST TIGHTNESS: 0
EYE DISCHARGE: 0
SINUS PRESSURE: 1

## 2022-01-18 NOTE — PATIENT INSTRUCTIONS
Finish the entire course of antibiotic previously prescribed. Complete short course of oral steroid treatment. Use Zyrtec-D daily for 1 week. Imaging ordered. Use Motrin as needed for arm pain. Recommend OTC Voltaren gel. Avoid overhead lifting. Follow-up with PCP. Patient Education        Sinusitis: Care Instructions  Your Care Instructions     Sinusitis is an infection of the lining of the sinus cavities in your head. Sinusitis often follows a cold. It causes pain and pressure in your head and face. In most cases, sinusitis gets better on its own in 1 to 2 weeks. But some mild symptoms may last for several weeks. Sometimes antibiotics are needed. Follow-up care is a key part of your treatment and safety. Be sure to make and go to all appointments, and call your doctor if you are having problems. It's also a good idea to know your test results and keep a list of the medicines you take. How can you care for yourself at home? · Take an over-the-counter pain medicine, such as acetaminophen (Tylenol), ibuprofen (Advil, Motrin), or naproxen (Aleve). Read and follow all instructions on the label. · If the doctor prescribed antibiotics, take them as directed. Do not stop taking them just because you feel better. You need to take the full course of antibiotics. · Be careful when taking over-the-counter cold or flu medicines and Tylenol at the same time. Many of these medicines have acetaminophen, which is Tylenol. Read the labels to make sure that you are not taking more than the recommended dose. Too much acetaminophen (Tylenol) can be harmful. · Breathe warm, moist air from a steamy shower, a hot bath, or a sink filled with hot water. Avoid cold, dry air. Using a humidifier in your home may help. Follow the directions for cleaning the machine. · Use saline (saltwater) nasal washes. This can help keep your nasal passages open and wash out mucus and bacteria.  You can buy saline nose drops at a grocery store or drugstore. Or you can make your own at home by adding 1 teaspoon of salt and 1 teaspoon of baking soda to 2 cups of distilled water. If you make your own, fill a bulb syringe with the solution, insert the tip into your nostril, and squeeze gently. Shan Like your nose. · Put a hot, wet towel or a warm gel pack on your face 3 or 4 times a day for 5 to 10 minutes each time. · Try a decongestant nasal spray like oxymetazoline (Afrin). Do not use it for more than 3 days in a row. Using it for more than 3 days can make your congestion worse. When should you call for help? Call your doctor now or seek immediate medical care if:    · You have new or worse swelling or redness in your face or around your eyes.     · You have a new or higher fever. Watch closely for changes in your health, and be sure to contact your doctor if:    · You have new or worse facial pain.     · The mucus from your nose becomes thicker (like pus) or has new blood in it.     · You are not getting better as expected. Where can you learn more? Go to https://Hum.RelayRides. org and sign in to your [employer] account. Enter D803 in the Tungle.me box to learn more about \"Sinusitis: Care Instructions. \"     If you do not have an account, please click on the \"Sign Up Now\" link. Current as of: September 8, 2021               Content Version: 13.1  © 2006-2021 Healthwise, Incorporated. Care instructions adapted under license by [facility] ([facility]). If you have questions about a medical condition or this instruction, always ask your healthcare professional. [place] [address] any warranty or liability for your use of this information.

## 2022-01-18 NOTE — PROGRESS NOTES
[address] [facility] WALK-IN  [address] [facility] 15 WALK-IN  [address] [facility] 15[address]  [address] [address]  Dept: [phone]    Timothy Larose is a 80 y.o. female Established patient, who presents to the walk-in clinic today with conditions/complaints as noted below:    Chief Complaint   Patient presents with    Other     pt is on antibiotics for sinus infection for 1 week, she does not feel any better her nose just runs all day and night. Sinus drainage is thick in her throat         HPI:     Sinus Problem  This is a recurrent problem. The current episode started 1 to 4 weeks ago (9 days ago). The problem is unchanged. There has been no fever. Associated symptoms include congestion, coughing (dry), headaches, sinus pressure, sneezing and a sore throat. Pertinent negatives include no chills, ear pain or shortness of breath. Past treatments include antibiotics (Mucinex). The treatment provided mild relief. Arm Pain   The incident occurred more than 1 week ago (ongoing for over 1 month). There was no injury mechanism. Pain location: left arm. The quality of the pain is described as aching. The pain does not radiate. The pain is at a severity of 10/10. The pain has been intermittent since the incident. Associated symptoms include tingling. Pertinent negatives include no muscle weakness or numbness. The symptoms are aggravated by movement. She has tried nothing for the symptoms. Patient has received both doses of the Moderna COVID-19 vaccine. She contacted her PCP on  for sinus issues and was started on an antibiotic.     Past Medical History:   Diagnosis Date    Asthma     Cancer of lung ([facility] [address] 2014    Diagnosed in  Check annual chest x-ray    COPD (chronic obstructive pulmonary disease) (HCC)     ESBL (extended spectrum beta-lactamase) producing bacteria infection 07/24/2018    E. Coli urine    High cholesterol 1/23/2017    Hypothyroid     Lung nodule 1/30/2018    CT scan ordered    Mild persistent asthma without complication 3/66/0842    Need for prophylactic vaccination and inoculation against influenza 10/21/2013    Osteopenia 3/28/2017    the patient's 10-year risk for major osteoporotic fracture is 11.9% and for hip fracture is 2.1%.  S/P lobectomy of lung 1/27/2014    Seizures (HCC)        Current Outpatient Medications   Medication Sig Dispense Refill    ibuprofen (ADVIL;MOTRIN) 600 MG tablet Take 1 tablet by mouth 4 times daily as needed for Pain 120 tablet 0    cetirizine-psuedoephedrine (ZYRTEC-D ALLERGY & CONGESTION) 5-120 MG per extended release tablet Take 1 tablet by mouth daily for 14 days 14 tablet 0    predniSONE (DELTASONE) 20 MG tablet Take 1 tablet by mouth daily for 5 days 5 tablet 0    amoxicillin-clavulanate (AUGMENTIN) 875-125 MG per tablet Take 1 tablet by mouth 2 times daily for 7 days 14 tablet 0    EUTHYROX 125 MCG tablet Take 1 tablet by mouth once daily 30 tablet 3    cefdinir (OMNICEF) 300 MG capsule       tiotropium (SPIRIVA HANDIHALER) 18 MCG inhalation capsule Inhale 1 capsule into the lungs daily 90 capsule 5    albuterol (PROVENTIL) (2.5 MG/3ML) 0.083% nebulizer solution Take 3 mLs by nebulization every 6 hours as needed for Wheezing 120 each 3    gabapentin (NEURONTIN) 100 MG capsule Take 1 capsule by mouth nightly for 30 days.  30 capsule 1    meloxicam (MOBIC) 15 MG tablet Take 1 tablet by mouth daily as needed for Pain 30 tablet 0    albuterol sulfate HFA (PROAIR HFA) 108 (90 Base) MCG/ACT inhaler Inhale 2 puffs into the lungs every 6 hours as needed for Wheezing 1 Inhaler 3     Current Facility-Administered Medications   Medication Dose Route Frequency Provider Last Rate Last Admin    albuterol (ACCUNEB) nebulizer solution 0.63 mg  0.63 mg Nebulization Once Marshae Knife MD [place]        methylPREDNISolone acetate (DEPO-MEDROL) injection 80 mg  80 mg IntraMUSCular Once Adam De MD        methylPREDNISolone acetate (DEPO-MEDROL) injection 40 mg  40 mg IntraMUSCular Once Adam De MD           Allergies   Allergen Reactions    Benactyzine     Benadryl [Diphenhydramine]     Ciprofloxacin     Codeine     Dilantin  [Phenytoin Sodium Extended]     Dye [Iodides]     Lorazepam     Prochlorperazine Edisylate     Sulfa Antibiotics     Sulfamethoxazole-Trimethoprim     Zofran [Ondansetron Hcl] Nausea And Vomiting       :     Review of Systems   Constitutional: Negative for chills, fatigue and fever. HENT: Positive for congestion, postnasal drip, rhinorrhea, sinus pressure, sneezing and sore throat. Negative for ear pain. Eyes: Negative for discharge. Respiratory: Positive for cough (dry). Negative for chest tightness and shortness of breath. Musculoskeletal: Positive for myalgias (left arm). Negative for joint swelling. Skin: Negative for rash. Neurological: Positive for tingling and headaches. Negative for numbness.       :     /80   Pulse 101   Temp 97.2 °F (36.2 °C) (Temporal)   Ht 5' 1\" (1.549 m)   Wt 143 lb (64.9 kg)   SpO2 98%   BMI 27.02 kg/m²     Physical Exam  Vitals reviewed. Constitutional:       General: She is not in acute distress. Appearance: Normal appearance. HENT:      Head: Normocephalic and atraumatic. Right Ear: Tympanic membrane, ear canal and external ear normal.      Left Ear: Tympanic membrane, ear canal and external ear normal.      Nose: Congestion present. No rhinorrhea. Right Sinus: No maxillary sinus tenderness or frontal sinus tenderness. Left Sinus: No maxillary sinus tenderness or frontal sinus tenderness. Mouth/Throat:      Mouth: Mucous membranes are moist.      Pharynx: Oropharynx is clear. No posterior oropharyngeal erythema.    Eyes:      Conjunctiva/sclera: Conjunctivae normal.   Cardiovascular:      Rate and Rhythm: Normal rate and regular rhythm. Heart sounds: Normal heart sounds. Pulmonary:      Effort: Pulmonary effort is normal.      Breath sounds: Normal breath sounds. Musculoskeletal:         General: No signs of injury. Left shoulder: No swelling, deformity, tenderness or bony tenderness. Decreased range of motion (extension/abduction). Normal pulse. Cervical back: Neck supple. Lymphadenopathy:      Cervical: No cervical adenopathy. Skin:     General: Skin is warm and dry. Findings: No erythema or rash. Neurological:      General: No focal deficit present. Mental Status: She is alert and oriented to person, place, and time. Psychiatric:         Attention and Perception: Attention normal.         Mood and Affect: Mood normal.         Speech: Speech normal.         Behavior: Behavior normal. Behavior is cooperative.           :          1. Acute recurrent sinusitis, unspecified location  -     cetirizine-psuedoephedrine (ZYRTEC-D ALLERGY & CONGESTION) 5-120 MG per extended release tablet; Take 1 tablet by mouth daily for 14 days, Disp-14 tablet, R-0Normal  -     predniSONE (DELTASONE) 20 MG tablet; Take 1 tablet by mouth daily for 5 days, Disp-5 tablet, R-0Normal  2. Muscle strain of left upper arm, initial encounter  -     XR SHOULDER LEFT (MIN 2 VIEWS); Future  -     ibuprofen (ADVIL;MOTRIN) 600 MG tablet; Take 1 tablet by mouth 4 times daily as needed for Pain, Disp-120 tablet, R-0Normal  3. Encounter for screening for COVID-19  -     COVID-19; Future       :      Return if symptoms worsen or fail to improve.     Orders Placed This Encounter   Medications    ibuprofen (ADVIL;MOTRIN) 600 MG tablet     Sig: Take 1 tablet by mouth 4 times daily as needed for Pain     Dispense:  120 tablet     Refill:  0    cetirizine-psuedoephedrine (ZYRTEC-D ALLERGY & CONGESTION) 5-120 MG per extended release tablet     Sig: Take 1 tablet by mouth daily for 14 days     Dispense:  14 tablet     Refill:  0    predniSONE (DELTASONE) 20 MG tablet     Sig: Take 1 tablet by mouth daily for 5 days     Dispense:  5 tablet     Refill:  0      Due to coinciding symptoms, advised testing for COVID-19. Patient agrees, instructed to quarantine pending COVID-19 test results. Advised to finish the entire course of antibiotic previously prescribed. Complete short course of oral steroid treatment. Recommend using Zyrtec-D daily for 1 week. Imaging ordered. Instructed to follow RICE treatment and avoid any overhead lifting. Use Motrin as needed for pain. May apply ice/heat as needed for comfort. Follow-up with PCP. Patient and/or parent given educational materials - see patient instructions. Discussed use, benefit, and side effects of prescribed medications. All patient questions answered. Patient and/or parent voiced understanding.       Electronically signed by SORAYA Lewis 1/18/2022 at 10:19 AM

## 2022-01-19 DIAGNOSIS — Z11.52 ENCOUNTER FOR SCREENING FOR COVID-19: ICD-10-CM

## 2022-01-20 LAB
SARS-COV-2: ABNORMAL
SARS-COV-2: DETECTED
SOURCE: ABNORMAL

## 2022-02-24 ENCOUNTER — OFFICE VISIT (OUTPATIENT)
Dept: PULMONOLOGY | Age: 72
End: 2022-02-24
Payer: MEDICARE

## 2022-02-24 VITALS
DIASTOLIC BLOOD PRESSURE: 74 MMHG | HEIGHT: 61 IN | SYSTOLIC BLOOD PRESSURE: 122 MMHG | BODY MASS INDEX: 27.75 KG/M2 | HEART RATE: 116 BPM | RESPIRATION RATE: 20 BRPM | WEIGHT: 147 LBS | TEMPERATURE: 97.3 F | OXYGEN SATURATION: 99 %

## 2022-02-24 DIAGNOSIS — J44.9 CHRONIC OBSTRUCTIVE PULMONARY DISEASE, UNSPECIFIED COPD TYPE (HCC): Primary | ICD-10-CM

## 2022-02-24 DIAGNOSIS — Z98.890 H/O PNEUMONECTOMY: ICD-10-CM

## 2022-02-24 DIAGNOSIS — J98.4 RESTRICTIVE LUNG DISEASE: ICD-10-CM

## 2022-02-24 DIAGNOSIS — C34.91 MALIGNANT NEOPLASM OF RIGHT LUNG, UNSPECIFIED PART OF LUNG (HCC): ICD-10-CM

## 2022-02-24 DIAGNOSIS — Z90.2 H/O PNEUMONECTOMY: ICD-10-CM

## 2022-02-24 PROCEDURE — 3023F SPIROM DOC REV: CPT | Performed by: INTERNAL MEDICINE

## 2022-02-24 PROCEDURE — 4040F PNEUMOC VAC/ADMIN/RCVD: CPT | Performed by: INTERNAL MEDICINE

## 2022-02-24 PROCEDURE — 1090F PRES/ABSN URINE INCON ASSESS: CPT | Performed by: INTERNAL MEDICINE

## 2022-02-24 PROCEDURE — G8427 DOCREV CUR MEDS BY ELIG CLIN: HCPCS | Performed by: INTERNAL MEDICINE

## 2022-02-24 PROCEDURE — 1123F ACP DISCUSS/DSCN MKR DOCD: CPT | Performed by: INTERNAL MEDICINE

## 2022-02-24 PROCEDURE — G8484 FLU IMMUNIZE NO ADMIN: HCPCS | Performed by: INTERNAL MEDICINE

## 2022-02-24 PROCEDURE — 3017F COLORECTAL CA SCREEN DOC REV: CPT | Performed by: INTERNAL MEDICINE

## 2022-02-24 PROCEDURE — 1036F TOBACCO NON-USER: CPT | Performed by: INTERNAL MEDICINE

## 2022-02-24 PROCEDURE — 99214 OFFICE O/P EST MOD 30 MIN: CPT | Performed by: INTERNAL MEDICINE

## 2022-02-24 PROCEDURE — G8417 CALC BMI ABV UP PARAM F/U: HCPCS | Performed by: INTERNAL MEDICINE

## 2022-02-24 PROCEDURE — G8399 PT W/DXA RESULTS DOCUMENT: HCPCS | Performed by: INTERNAL MEDICINE

## 2022-02-24 ASSESSMENT — ENCOUNTER SYMPTOMS
SHORTNESS OF BREATH: 1
WHEEZING: 0
COUGH: 0

## 2022-02-24 NOTE — PATIENT INSTRUCTIONS
Printed AVS for patient; Pulmonary rehab at [facility] will be called to schedule and order re-faxed to them to call patient to schedule.   eugene

## 2022-02-25 NOTE — PROGRESS NOTES
REASON FOR THE CONSULTATION:  COPD, right pneumonectomy for lung cancer  HISTORY OF PRESENT ILLNESS:    Bernardo Saha is a 80y.o. year old female   Did not go for pulmonary rehab - did not receive call . Sob with exertion stable   No hemoptysis   Cannot afford inhalers other than albuterol  Last visit    was last seen in pulmonary clinic in 2017 and she did not follow-up after that since she retired patient has noticed that she has been having progressive shortness of breath over the last few years. She uses Spiriva but cannot afford other inhalers because of expense and she also since the Spiriva prescription to [facility]). She only uses albuterol once a day. CT of the chest in 2018 did not show residual malignancy or evidence of malignancy. PFT showed restrictive pattern. She does not have cough with hemoptysis or purulent sputum. She does not complain of active wheezing      Last visit        She had history of lung cancer in 1995 and a right pneumonectomy post chemo and radiation. She quit smoking in 1995. She presently works as a nurse's aide and is active all day long can walk all day only sob when goes upstairs or incline . Uses inhaler ( albuterol ) based on weather , but cannot afford spiriva . She does have a cough with clear sputum. No fever, chills, rigors, no hemoptysis, no weight loss    Had lung cancer 1995 right lung  cancer . right pneumonectomy , chemo , xrt    she had right  pneumonectomy in [place] [address] , Dr Ruthann Jacques smoking 1995 . PAST MEDICAL HISTORY:       Diagnosis Date    Asthma     Cancer of lung ([facility] [address] 1/27/2014    Diagnosed in 1995 Check annual chest x-ray    COPD (chronic obstructive pulmonary disease) (HCC)     ESBL (extended spectrum beta-lactamase) producing bacteria infection 07/24/2018    E.  Coli urine    High cholesterol 1/23/2017    Hypothyroid     Lung nodule 1/30/2018    CT scan ordered    Mild persistent asthma without complication 3/64/3461  Need for prophylactic vaccination and inoculation against influenza 10/21/2013    Osteopenia 3/28/2017    the patient's 10-year risk for major osteoporotic fracture is 11.9% and for hip fracture is 2.1%.  S/P lobectomy of lung 1/27/2014    Seizures ([address]          Family History:   No family history on file. SURGICAL HISTORY:   Past Surgical History:   Procedure Laterality Date    BREAST CYST EXCISION Bilateral     fibiritic    CHOLECYSTECTOMY      LUNG REMOVAL, TOTAL Right     OVARIAN CYST REMOVAL Bilateral     TONSILLECTOMY      TUBAL LIGATION             SOCIAL AND OCCUPATIONAL HEALTH:      There  no history of TB or TB exposure. There  no asbestos or silica dust exposure. The patient reports  no coal, foundry, quarry or [employer] exposure. Travel history reveals no. There  no history of recreational or IV drug use. There  no hot tube exposure. Pets  no    Occupational history nurses aid     TOBACCO:   reports that she quit smoking about 26 years ago. Her smoking use included cigarettes. She has a 11.50 pack-year smoking history. She has never used smokeless tobacco.  ETOH:   reports no history of alcohol use.   Immunization History   Administered Date(s) Administered    COVID-19, Moderna, Primary or Immunocompromised, PF, 100mcg/0.5mL 02/26/2021, 03/26/2021    Influenza A ([id number]) Vaccine PF IM 11/24/2009    Influenza Vaccine, unspecified formulation 10/05/2017    Influenza Virus Vaccine 10/05/2017    Influenza Whole 10/30/2015    Influenza, High Dose (Fluzone 65 yrs and older) 10/16/2018    Influenza, Intradermal, Preservative free 10/21/2013    Influenza, Quadv, adjuvanted, 65 yrs +, IM, PF (Fluad) 09/24/2020, 10/28/2021    Influenza, Triv, inactivated, subunit, adjuvanted, IM (Fluad 65 yrs and older) 10/19/2019    Pneumococcal Conjugate 13-valent ([id number]) 03/14/2017    Pneumococcal Polysaccharide ([id number]) 07/27/2020    Tdap (Boostrix, Adacel) 03/25/2017 ALLERGIES:      Allergies   Allergen Reactions    Benactyzine     Benadryl [Diphenhydramine]     Ciprofloxacin     Codeine     Dilantin  [Phenytoin Sodium Extended]     Dye [Iodides]     Lorazepam     Prochlorperazine Edisylate     Sulfa Antibiotics     Sulfamethoxazole-Trimethoprim     Zofran [Ondansetron Hcl] Nausea And Vomiting         Home Meds:   Prior to Admission medications    Medication Sig Start Date End Date Taking? Authorizing Provider   ibuprofen (ADVIL;MOTRIN) 600 MG tablet Take 1 tablet by mouth 4 times daily as needed for Pain 1/18/22  Yes SORAYA Damon CNP   EUTHYROX 125 MCG tablet Take 1 tablet by mouth once daily 12/7/21  Yes SORAYA Montgomery CNP   cefdinir (OMNICEF) 300 MG capsule  10/21/21  Yes Historical Provider, MD   tiotropium (Tania King) 18 MCG inhalation capsule Inhale 1 capsule into the lungs daily 10/28/21  Yes SORAYA Montgomery CNP   albuterol (PROVENTIL) (2.5 MG/3ML) 0.083% nebulizer solution Take 3 mLs by nebulization every 6 hours as needed for Wheezing 8/16/21  Yes SORAYA Montgomery CNP   meloxicam (MOBIC) 15 MG tablet Take 1 tablet by mouth daily as needed for Pain 10/29/20  Yes SORAYA Montgomery CNP   albuterol sulfate HFA (PROAIR HFA) 108 (90 Base) MCG/ACT inhaler Inhale 2 puffs into the lungs every 6 hours as needed for Wheezing 7/27/20  Yes SORAYA Montgomery CNP   gabapentin (NEURONTIN) 100 MG capsule Take 1 capsule by mouth nightly for 30 days. 4/28/21 5/28/21  SORAYA Lombardi CNP             REVIEW OF SYSTEMS:  Review of Systems   Constitutional: Negative. HENT: Negative. Respiratory: Positive for shortness of breath. Negative for cough and wheezing. Cardiovascular: Negative.         Vitals:  /74   Pulse 116   Temp 97.3 °F (36.3 °C)   Resp 20   Ht 5' 1\" (1.549 m)   Wt 147 lb (66.7 kg)   SpO2 99% Comment: room air  BMI 27.78 kg/m²     PHYSICAL EXAM:  Head and neck atraumatic, normocephalic    Lymph nodes-no cervical, supraclavicular lymphadenopathy    Neck-no JVP elevation    Lungs - AP diameter of chest increased. Thoracic expansion and diaphragmatic excursion diminished. BS diminished and expiratory phase prolonged. No dullness to percussion or tenderness to palpation. No bronchial breath sounds . CVS- S1, S2 regular. No S3 no S4, no murmurs    Abdomen-nontender, nondistended. Bowel sounds are present. No organomegaly    Lower extremity-no edema    Upper extremity-no edema    Neurological-grossly normal cranial nerves. No overt motor deficit          IMPRESSION:     1. Status post right pneumonectomy 2. Secondary Hyperexpansion left lung. 3. Right pleural calcification or other mineral such as talc. 4. No evidence of recurrent or metastatic malignancy. Report Electronically signed by Whit De Jesus M.D. on 4/11/2014 2:40 PM Transcribed by: [facility] on Apr 11 2014  2:42P Read by:  Matt Marr M.D.  [id number] on Apr 11 2014  2:42P Electronically Signed by:  DR. Matt Marr M.D. on:  Apr 11 2014   2:42P   Acuity: Acute   Type of Exam: Initial       FINDINGS:   Stable findings compatible with right pneumonectomy with volume loss on the   right and rightward mediastinal shift and complete opacification of right   hemithorax. Left lung remains clear without focal consolidation, pleural   effusion or pulmonary edema. No pneumothoraces. Limited evaluation of   cardiac and mediastinal silhouettes secondary to right hemithorax findings   but cardiac and mediastinal silhouettes appear grossly stable. No acute bony   abnormalities are seen. Impression   Stable exam without evidence for acute cardiopulmonary process. IMPRESSION:     Diagnosis Orders   1. Chronic obstructive pulmonary disease, unspecified COPD type (HCC)  XR CHEST (2 VW)   2. Malignant neoplasm of right lung, unspecified part of lung (HCC)  XR CHEST (2 VW)   3.  H/O pneumonectomy right   XR CHEST (2 VW)   4. Restrictive lung disease          :                PLAN:      Stable PFT since 2017 - reviewed with pt   Cannot afford spiriva   Cont albuterol   Will benefit from pulmonary rehab   Follow up 6 months with xray chest       I hope this updates you on my evaluation and clinical thinking. Thank you for allowing me to participate in his care. Sincerely,      Electronically signed by Federico Carney MD on 2/24/2022 at 7:21 PM       Please note that this chart was generated using voice recognition Dragon dictation software. Although every effort was made to ensure the accuracy of this automated transcription, some errors in transcription may have occurred.

## 2022-03-16 ENCOUNTER — OFFICE VISIT (OUTPATIENT)
Dept: PRIMARY CARE CLINIC | Age: 72
End: 2022-03-16
Payer: MEDICARE

## 2022-03-16 ENCOUNTER — HOSPITAL ENCOUNTER (OUTPATIENT)
Age: 72
Setting detail: SPECIMEN
Discharge: HOME | End: 2022-03-16

## 2022-03-16 VITALS
WEIGHT: 147 LBS | DIASTOLIC BLOOD PRESSURE: 76 MMHG | HEART RATE: 78 BPM | OXYGEN SATURATION: 93 % | SYSTOLIC BLOOD PRESSURE: 120 MMHG | BODY MASS INDEX: 27.78 KG/M2

## 2022-03-16 DIAGNOSIS — Z13.1 SCREENING FOR DIABETES MELLITUS: ICD-10-CM

## 2022-03-16 DIAGNOSIS — E03.9 HYPOTHYROIDISM, UNSPECIFIED TYPE: ICD-10-CM

## 2022-03-16 DIAGNOSIS — Z78.0 POST-MENOPAUSAL: ICD-10-CM

## 2022-03-16 DIAGNOSIS — Z13.0 SCREENING, ANEMIA, DEFICIENCY, IRON: ICD-10-CM

## 2022-03-16 DIAGNOSIS — E55.9 VITAMIN D DEFICIENCY: ICD-10-CM

## 2022-03-16 DIAGNOSIS — R35.0 URINARY FREQUENCY: ICD-10-CM

## 2022-03-16 DIAGNOSIS — M25.512 CHRONIC LEFT SHOULDER PAIN: ICD-10-CM

## 2022-03-16 DIAGNOSIS — Z13.6 SCREENING FOR CARDIOVASCULAR CONDITION: ICD-10-CM

## 2022-03-16 DIAGNOSIS — N30.00 ACUTE CYSTITIS WITHOUT HEMATURIA: Primary | ICD-10-CM

## 2022-03-16 DIAGNOSIS — G89.29 CHRONIC LEFT SHOULDER PAIN: ICD-10-CM

## 2022-03-16 DIAGNOSIS — M25.619 LIMITED RANGE OF MOTION (ROM) OF SHOULDER: ICD-10-CM

## 2022-03-16 LAB
BILIRUBIN, POC: NORMAL
BLOOD URINE, POC: NORMAL
CLARITY, POC: CLEAR
COLOR, POC: YELLOW
GLUCOSE URINE, POC: NORMAL
KETONES, POC: NORMAL
LEUKOCYTE EST, POC: NORMAL
NITRITE, POC: NORMAL
PH, POC: 5
PROTEIN, POC: NORMAL
SPECIFIC GRAVITY, POC: 1.02
UROBILINOGEN, POC: 0.2

## 2022-03-16 PROCEDURE — 1036F TOBACCO NON-USER: CPT | Performed by: NURSE PRACTITIONER

## 2022-03-16 PROCEDURE — 1090F PRES/ABSN URINE INCON ASSESS: CPT | Performed by: NURSE PRACTITIONER

## 2022-03-16 PROCEDURE — 81002 URINALYSIS NONAUTO W/O SCOPE: CPT | Performed by: NURSE PRACTITIONER

## 2022-03-16 PROCEDURE — 4040F PNEUMOC VAC/ADMIN/RCVD: CPT | Performed by: NURSE PRACTITIONER

## 2022-03-16 PROCEDURE — G8484 FLU IMMUNIZE NO ADMIN: HCPCS | Performed by: NURSE PRACTITIONER

## 2022-03-16 PROCEDURE — 1123F ACP DISCUSS/DSCN MKR DOCD: CPT | Performed by: NURSE PRACTITIONER

## 2022-03-16 PROCEDURE — G8417 CALC BMI ABV UP PARAM F/U: HCPCS | Performed by: NURSE PRACTITIONER

## 2022-03-16 PROCEDURE — 3017F COLORECTAL CA SCREEN DOC REV: CPT | Performed by: NURSE PRACTITIONER

## 2022-03-16 PROCEDURE — G8427 DOCREV CUR MEDS BY ELIG CLIN: HCPCS | Performed by: NURSE PRACTITIONER

## 2022-03-16 PROCEDURE — G8399 PT W/DXA RESULTS DOCUMENT: HCPCS | Performed by: NURSE PRACTITIONER

## 2022-03-16 PROCEDURE — 99214 OFFICE O/P EST MOD 30 MIN: CPT | Performed by: NURSE PRACTITIONER

## 2022-03-16 RX ORDER — NITROFURANTOIN 25; 75 MG/1; MG/1
100 CAPSULE ORAL 2 TIMES DAILY
Qty: 10 CAPSULE | Refills: 0 | Status: SHIPPED | OUTPATIENT
Start: 2022-03-16 | End: 2022-03-21

## 2022-03-16 RX ORDER — MELOXICAM 7.5 MG/1
7.5 TABLET ORAL DAILY
Qty: 90 TABLET | Refills: 1 | Status: SHIPPED | OUTPATIENT
Start: 2022-03-16 | End: 2022-06-13

## 2022-03-16 RX ORDER — LEVOTHYROXINE SODIUM 0.12 MG/1
125 TABLET ORAL DAILY
Qty: 90 TABLET | Refills: 1 | Status: SHIPPED | OUTPATIENT
Start: 2022-03-16 | End: 2022-04-26 | Stop reason: SDUPTHER

## 2022-03-16 ASSESSMENT — ENCOUNTER SYMPTOMS
CHEST TIGHTNESS: 0
NAUSEA: 0
RHINORRHEA: 0
SHORTNESS OF BREATH: 0
VOMITING: 0
ABDOMINAL PAIN: 0
DIARRHEA: 0
COLOR CHANGE: 0
SORE THROAT: 0

## 2022-03-16 ASSESSMENT — PATIENT HEALTH QUESTIONNAIRE - PHQ9
SUM OF ALL RESPONSES TO PHQ QUESTIONS 1-9: 0
SUM OF ALL RESPONSES TO PHQ QUESTIONS 1-9: 0
SUM OF ALL RESPONSES TO PHQ9 QUESTIONS 1 & 2: 0
2. FEELING DOWN, DEPRESSED OR HOPELESS: 0
SUM OF ALL RESPONSES TO PHQ QUESTIONS 1-9: 0
1. LITTLE INTEREST OR PLEASURE IN DOING THINGS: 0
SUM OF ALL RESPONSES TO PHQ QUESTIONS 1-9: 0

## 2022-03-16 NOTE — PROGRESS NOTES
[address] PRIMARY CARE  [facility] [address] [facility] [address]  [address] [address]  Dept: [phone]  Dept Fax: [phone]    Sarthak Mojica is a 80 y.o. female who presentstoday for her medical conditions/complaints as noted below. Sarthak Mojica is c/o of  Chief Complaint   Patient presents with    Urinary Frequency     urgency x1 wk         HPI:     Here today for acute complaints of urinary urgency     Urinary Tract Infection   This is a new problem. The current episode started in the past 7 days. The problem occurs every urination. The problem has been unchanged. The patient is experiencing no pain. There has been no fever. There is no history of pyelonephritis. Associated symptoms include frequency. Pertinent negatives include no chills, flank pain, hematuria, nausea or vomiting. She has tried nothing for the symptoms. Pt also c/o chronic left, otherwise. She was evaluated here in the walk in 22 and was given Ibuprofen 600 mg PRN along with an xray which was negative. She had reduced ROM and feels her pain is worsening. Complains of intermittent bilateral upper extremity numbness and tingling, denies back or neck pain. Has been lifting heavy garbage bags at work, feels this may have contributed to left shoulder pain, otherwise denies any known injury or trauma. Pain worse with abduction and backward rotation    Due for screening labs, no other concerns or complaints.      Hemoglobin A1C (%)   Date Value   2012 5.5             ( goal A1C is < 7)   No results found for: LABMICR  LDL Cholesterol (mg/dL)   Date Value   10/28/2020 133 (H)   2012 150 (H)       (goal LDL is <100)   AST (U/L)   Date Value   10/28/2020 17     ALT (U/L)   Date Value   10/28/2020 12     BUN (mg/dL)   Date Value   10/28/2020 21     BP Readings from Last 3 Encounters:   22 120/76   22 122/74   22 120/80          ([id number])    Past Medical History: Diagnosis Date    Asthma     [facility] lung ([facility] [address] 2014    Diagnosed in [facility]. 49 annual chest x-ray    [facility]chronic obstructive pulmonary disease) ([facility]     ESBL (extended spectrum beta-lactamase) producing bacteria infection 2018    E. Coli urine    High cholesterol 2017    Hypothyroid     Lung nodule 2018    CT scan ordered    Mild persistent asthma without complication 8798    Need for prophylactic vaccination and inoculation against influenza 10/21/2013    Osteopenia 3/28/2017    the patient's 10-year risk for major osteoporotic fracture is 11.9% and for hip fracture is 2.1%.  S/P lobectomy of lung 2014    Seizures ([facility])       Past Surgical History:   Procedure Laterality Date    BREAST CYST EXCISION Bilateral     fibiritic    CHOLECYSTECTOMY      LUNG REMOVAL, TOTAL Right     OVARIAN CYST REMOVAL Bilateral     TONSILLECTOMY      TUBAL LIGATION         No family history on file.     Social History     Tobacco Use    Smoking status: Former Smoker     Packs/day: 0.50     Years: 23.00     Pack years: 11.50     Types: Cigarettes     Quit date: 10/7/1995     Years since quittin.4    Smokeless tobacco: Never Used   Substance Use Topics    Alcohol use: No     Alcohol/week: 0.0 standard drinks      Current Outpatient Medications   Medication Sig Dispense Refill    nitrofurantoin, macrocrystal-monohydrate, (MACROBID) 100 MG capsule Take 1 capsule by mouth 2 times daily for 5 days 10 capsule 0    meloxicam (MOBIC) 7.5 MG tablet Take 1 tablet by mouth daily 90 tablet 1    levothyroxine (EUTHYROX) 125 MCG tablet Take 1 tablet by mouth Daily 90 tablet 1    ibuprofen (ADVIL;MOTRIN) 600 MG tablet Take 1 tablet by mouth 4 times daily as needed for Pain 120 tablet 0    cefdinir (OMNICEF) 300 MG capsule       tiotropium (SPIRIVA HANDIHALER) 18 MCG inhalation capsule Inhale 1 capsule into the lungs daily 90 capsule 5    albuterol (PROVENTIL) (2.5 MG/3ML) 0.083% nebulizer solution Take 3 mLs by nebulization every 6 hours as needed for Wheezing 120 each 3    albuterol sulfate HFA (PROAIR HFA) 108 (90 Base) MCG/ACT inhaler Inhale 2 puffs into the lungs every 6 hours as needed for Wheezing 1 Inhaler 3    gabapentin (NEURONTIN) 100 MG capsule Take 1 capsule by mouth nightly for 30 days. 30 capsule 1     Current Facility-Administered Medications   Medication Dose Route Frequency Provider Last Rate Last Admin    albuterol (ACCUNEB) nebulizer solution 0.63 mg  0.63 mg Nebulization Once Christina Medellin MD        methylPREDNISolone acetate (DEPO-MEDROL) injection 80 mg  80 mg IntraMUSCular Once Christina Medellin MD        methylPREDNISolone acetate (DEPO-MEDROL) injection 40 mg  40 mg IntraMUSCular Once Christina Medellin MD         Allergies   Allergen Reactions    Benactyzine     Benadryl [Diphenhydramine]     Ciprofloxacin     Codeine     Dilantin  [Phenytoin Sodium Extended]     Dye [Iodides]     Lorazepam     Prochlorperazine Edisylate     Sulfa Antibiotics     Sulfamethoxazole-Trimethoprim     Zofran [Ondansetron Hcl] Nausea And Vomiting       Health Maintenance   Topic Date Due    Shingles Vaccine (1 of 2) Never done    COVID-19 Vaccine (3 - Booster for Moderna series) 08/26/2021    Depression Screen  10/28/2022    Annual Wellness Visit (AWV)  10/29/2022    Breast cancer screen  11/17/2022    Colorectal Cancer Screen  08/01/2023    Lipid screen  10/28/2025    DTaP/Tdap/Td vaccine (2 - Td or Tdap) 03/25/2027    DEXA (modify frequency per FRAX score)  Completed    Flu vaccine  Completed    Pneumococcal 65+ years Vaccine  Completed    Hepatitis C screen  Completed    Hepatitis A vaccine  Aged Out    Hepatitis B vaccine  Aged Out    Hib vaccine  Aged Out    Meningococcal (ACWY) vaccine  Aged Out       Subjective:      Review of Systems   Constitutional: Negative for activity change, chills, fatigue and fever.    HENT: Negative for congestion, rhinorrhea and sore throat. Eyes: Negative for visual disturbance. Respiratory: Negative for chest tightness and shortness of breath. Cardiovascular: Negative for chest pain and palpitations. Gastrointestinal: Negative for abdominal pain, diarrhea, nausea and vomiting. Endocrine: Negative for polydipsia. Genitourinary: Positive for frequency. Negative for difficulty urinating, flank pain and hematuria. Musculoskeletal: Positive for arthralgias (Left shoulder pain). Negative for myalgias. Skin: Negative for color change. Neurological: Negative for weakness and headaches. Psychiatric/Behavioral: Negative for behavioral problems. The patient is not nervous/anxious. All other systems reviewed and are negative. Objective:   /76   Pulse 78   Wt 147 lb (66.7 kg)   SpO2 93%   BMI 27.78 kg/m²   Physical Exam  Vitals reviewed. Constitutional:       General: She is not in acute distress. Appearance: Normal appearance. HENT:      Head: Normocephalic. Eyes:      Pupils: Pupils are equal, round, and reactive to light. Cardiovascular:      Rate and Rhythm: Normal rate and regular rhythm. Pulses: Normal pulses. Heart sounds: Normal heart sounds. Pulmonary:      Effort: Pulmonary effort is normal.      Breath sounds: Normal breath sounds. Abdominal:      General: There is no distension. Palpations: Abdomen is soft. Tenderness: There is no right CVA tenderness or left CVA tenderness. Musculoskeletal:         General: Normal range of motion. Cervical back: Neck supple. Right lower leg: No edema. Left lower leg: No edema. Lymphadenopathy:      Cervical: No cervical adenopathy. Skin:     General: Skin is warm and dry. Capillary Refill: Capillary refill takes less than 2 seconds. Neurological:      General: No focal deficit present. Mental Status: She is alert and oriented to person, place, and time.    Psychiatric: Mood and Affect: Mood normal.         Behavior: Behavior normal.           :       Diagnosis Orders   1. Acute cystitis without hematuria  nitrofurantoin, macrocrystal-monohydrate, (MACROBID) 100 MG capsule   2. Urinary frequency  POCT Urinalysis no Micro    Culture, Urine    nitrofurantoin, macrocrystal-monohydrate, (MACROBID) 100 MG capsule   3. Chronic left shoulder pain  El De Souza MD, Orthopedic Surgery, [place]    meloxicam (MOBIC) 7.5 MG tablet   4. Limited range of motion (ROM) of shoulder  El De Souza MD, Orthopedic Surgery, [place]   5. Hypothyroidism, unspecified type  TSH with Reflex    levothyroxine (EUTHYROX) 125 MCG tablet   6. Screening for cardiovascular condition  Lipid Panel   7. Screening, anemia, deficiency, iron  CBC with Auto Differential   8. Screening for diabetes mellitus  Comprehensive Metabolic Panel   9. Post-menopausal  Vitamin D 25 Hydroxy   10. Vitamin D deficiency  Vitamin D 25 Hydroxy             :          1. Acute cystitis without hematuria  2. Urinary frequency  New, POCT UA in office shows small leuks, culture sent. Plan for Macrobid 100 mg BID for 5 days. Encouraged pt to increased water intake versus her preferred tea, aware of s/s of worsening infection.     - POCT Urinalysis no Micro  - Culture, Urine; Future  - nitrofurantoin, macrocrystal-monohydrate, (MACROBID) 100 MG capsule; Take 1 capsule by mouth 2 times daily for 5 days  Dispense: 10 capsule; Refill: 0    3. Chronic left shoulder pain  4. Limited range of motion (ROM) of shoulder  Chronic, worsening. Rx for Mobic 7.5 mg PRN and referral to ortho for further eval, discussed possibility of MRI or injections. - Mindy Pulido MD, Orthopedic Surgery, [place]  - meloxicam (MOBIC) 7.5 MG tablet; Take 1 tablet by mouth daily  Dispense: 90 tablet; Refill: 1    5. Hypothyroidism, unspecified type  Well-controlled historically on Euthyrox 125 mcg.  Screening labs and refill ordered. - TSH with Reflex; Future  - levothyroxine (EUTHYROX) 125 MCG tablet; Take 1 tablet by mouth Daily  Dispense: 90 tablet; Refill: 1    6. Screening for cardiovascular condition  - Lipid Panel; Future    7. Screening, anemia, deficiency, iron  - CBC with Auto Differential; Future    8. Screening for diabetes mellitus  - Comprehensive Metabolic Panel; Future    9. Vitamin D deficiency  10. Post-menopausal  - Vitamin D 25 Hydroxy; Future    Has 6-month follow-up scheduled on 4/28, she would like to keep this appointment for chronic conditions and to review labs  Return in about 6 weeks (around 4/28/2022) for COPD. Patient given educational materials - see patient instructions. Discussed use, benefit, and side effects of prescribed medications. All patient questions answered. Pt voiced understanding. Reviewed health maintenance. Instructed to continue current medications, diet and exercise. Patient agreed with treatment plan. Follow up as directed.        Electronicallysigned by SORAYA Castillo CNP on 3/16/2022 at 4:58 PM

## 2022-03-16 NOTE — PATIENT INSTRUCTIONS
Patient Education        Urinary Tract Infection (UTI) in Women: Care Instructions  Overview     A urinary tract infection, or UTI, is a general term for an infection anywhere between the kidneys and the urethra (where urine comes out). Most UTIs are bladder infections. They often cause pain or burning when you urinate. UTIs are caused by bacteria and can be cured with antibiotics. Be sure to complete your treatment so that the infection does not get worse. Follow-up care is a key part of your treatment and safety. Be sure to make and go to all appointments, and call your doctor if you are having problems. It's also a good idea to know your test results and keep a list of the medicines you take. How can you care for yourself at home? · Take your antibiotics as directed. Do not stop taking them just because you feel better. You need to take the full course of antibiotics. · Drink extra water and other fluids for the next day or two. This will help make the urine less concentrated and help wash out the bacteria that are causing the infection. (If you have kidney, heart, or liver disease and have to limit fluids, talk with your doctor before you increase the amount of fluids you drink.)  · Avoid drinks that are carbonated or have caffeine. They can irritate the bladder. · Urinate often. Try to empty your bladder each time. · To relieve pain, take a hot bath or lay a heating pad set on low over your lower belly or genital area. Never go to sleep with a heating pad in place. To prevent UTIs  · Drink plenty of water each day. This helps you urinate often, which clears bacteria from your system. (If you have kidney, heart, or liver disease and have to limit fluids, talk with your doctor before you increase the amount of fluids you drink.)  · Urinate when you need to. · If you are sexually active, urinate right after you have sex. · Change sanitary pads often.   · Avoid douches, bubble baths, feminine hygiene sprays, and other feminine hygiene products that have deodorants. · After going to the bathroom, wipe from front to back. When should you call for help? Call your doctor now or seek immediate medical care if:    · Symptoms such as fever, chills, nausea, or vomiting get worse or appear for the first time.     · You have new pain in your back just below your rib cage. This is called flank pain.     · There is new blood or pus in your urine.     · You have any problems with your antibiotic medicine. Watch closely for changes in your health, and be sure to contact your doctor if:    · You are not getting better after taking an antibiotic for 2 days.     · Your symptoms go away but then come back. Where can you learn more? Go to https://IncreaseCard.XMPie. org and sign in to your [employer] account. Enter Q481 in the Friday box to learn more about \"Urinary Tract Infection (UTI) in Women: Care Instructions. \"     If you do not have an account, please click on the \"Sign Up Now\" link. Current as of: February 10, 2021               Content Version: 13.1  © 0699-4056 [employer]. Care instructions adapted under license by [facility] ([facility]). If you have questions about a medical condition or this instruction, always ask your healthcare professional. [address] any warranty or liability for your use of this information.

## 2022-03-17 DIAGNOSIS — A49.1 GBS (GROUP B STREPTOCOCCUS) INFECTION: Primary | ICD-10-CM

## 2022-03-17 LAB
CULTURE: ABNORMAL
SPECIMEN DESCRIPTION: ABNORMAL

## 2022-03-17 RX ORDER — CEPHALEXIN 500 MG/1
500 CAPSULE ORAL 2 TIMES DAILY
Qty: 14 CAPSULE | Refills: 0 | Status: SHIPPED | OUTPATIENT
Start: 2022-03-17 | End: 2022-03-24

## 2022-04-13 ENCOUNTER — OFFICE VISIT (OUTPATIENT)
Dept: PRIMARY CARE CLINIC | Age: 72
End: 2022-04-13
Payer: COMMERCIAL

## 2022-04-13 VITALS
OXYGEN SATURATION: 91 % | DIASTOLIC BLOOD PRESSURE: 78 MMHG | HEART RATE: 70 BPM | BODY MASS INDEX: 27.96 KG/M2 | SYSTOLIC BLOOD PRESSURE: 118 MMHG | WEIGHT: 148 LBS

## 2022-04-13 DIAGNOSIS — J06.9 UPPER RESPIRATORY TRACT INFECTION, UNSPECIFIED TYPE: ICD-10-CM

## 2022-04-13 DIAGNOSIS — R06.02 SHORT OF BREATH ON EXERTION: ICD-10-CM

## 2022-04-13 DIAGNOSIS — J44.1 COPD WITH ACUTE EXACERBATION (HCC): Primary | ICD-10-CM

## 2022-04-13 DIAGNOSIS — J44.9 CHRONIC OBSTRUCTIVE PULMONARY DISEASE, UNSPECIFIED COPD TYPE (HCC): ICD-10-CM

## 2022-04-13 DIAGNOSIS — Z90.2 S/P LOBECTOMY OF LUNG: ICD-10-CM

## 2022-04-13 PROCEDURE — 99213 OFFICE O/P EST LOW 20 MIN: CPT | Performed by: NURSE PRACTITIONER

## 2022-04-13 PROCEDURE — 1090F PRES/ABSN URINE INCON ASSESS: CPT | Performed by: NURSE PRACTITIONER

## 2022-04-13 PROCEDURE — 4040F PNEUMOC VAC/ADMIN/RCVD: CPT | Performed by: NURSE PRACTITIONER

## 2022-04-13 PROCEDURE — G8427 DOCREV CUR MEDS BY ELIG CLIN: HCPCS | Performed by: NURSE PRACTITIONER

## 2022-04-13 PROCEDURE — 1123F ACP DISCUSS/DSCN MKR DOCD: CPT | Performed by: NURSE PRACTITIONER

## 2022-04-13 PROCEDURE — 3023F SPIROM DOC REV: CPT | Performed by: NURSE PRACTITIONER

## 2022-04-13 PROCEDURE — 3017F COLORECTAL CA SCREEN DOC REV: CPT | Performed by: NURSE PRACTITIONER

## 2022-04-13 PROCEDURE — G8399 PT W/DXA RESULTS DOCUMENT: HCPCS | Performed by: NURSE PRACTITIONER

## 2022-04-13 PROCEDURE — G8417 CALC BMI ABV UP PARAM F/U: HCPCS | Performed by: NURSE PRACTITIONER

## 2022-04-13 PROCEDURE — 1036F TOBACCO NON-USER: CPT | Performed by: NURSE PRACTITIONER

## 2022-04-13 RX ORDER — AZITHROMYCIN 250 MG/1
250 TABLET, FILM COATED ORAL SEE ADMIN INSTRUCTIONS
Qty: 6 TABLET | Refills: 0 | Status: SHIPPED | OUTPATIENT
Start: 2022-04-13 | End: 2022-04-18

## 2022-04-13 RX ORDER — PREDNISONE 20 MG/1
TABLET ORAL
Qty: 18 TABLET | Refills: 0 | Status: SHIPPED | OUTPATIENT
Start: 2022-04-13 | End: 2022-06-13

## 2022-04-13 RX ORDER — LORATADINE 10 MG/1
10 TABLET ORAL DAILY
Qty: 30 TABLET | Refills: 2 | Status: SHIPPED | OUTPATIENT
Start: 2022-04-13 | End: 2022-10-25

## 2022-04-13 RX ORDER — GUAIFENESIN 600 MG/1
600 TABLET, EXTENDED RELEASE ORAL 2 TIMES DAILY
Qty: 30 TABLET | Refills: 0 | Status: SHIPPED | OUTPATIENT
Start: 2022-04-13 | End: 2022-04-28

## 2022-04-13 ASSESSMENT — PATIENT HEALTH QUESTIONNAIRE - PHQ9
1. LITTLE INTEREST OR PLEASURE IN DOING THINGS: 0
SUM OF ALL RESPONSES TO PHQ QUESTIONS 1-9: 0
SUM OF ALL RESPONSES TO PHQ9 QUESTIONS 1 & 2: 0
SUM OF ALL RESPONSES TO PHQ QUESTIONS 1-9: 0
2. FEELING DOWN, DEPRESSED OR HOPELESS: 0
SUM OF ALL RESPONSES TO PHQ QUESTIONS 1-9: 0
SUM OF ALL RESPONSES TO PHQ QUESTIONS 1-9: 0

## 2022-04-13 NOTE — PROGRESS NOTES
[address] PRIMARY CARE  [facility] [address] [facility] [address]  [address] [address]  Dept: [phone]  Dept Fax: [phone]    Rah Frank is a 80 y.o. female who presentstoday for her medical conditions/complaints as noted below. Rah Frank is c/o of  Chief Complaint   Patient presents with    Cough     dry, loss of voice, fatigue, nausea x2 days. denies fever, body aches, GI issues         HPI:     Here with acute complaint of hoarse voice, fatigue, nausea and GI upset. She denies fever/chills, body aches or other associated symptoms of acute illness. Has been using her spiriva and albuterol PRN but has wheezing and SOB at times. There is no acute distress in office, is ill appearing. She does follow with pulm for COPD/asthma     She would like handicap placard renewed      Hemoglobin A1C (%)   Date Value   2012 5.5             ( goal A1C is < 7)   No results found for: LABMICR  LDL Cholesterol (mg/dL)   Date Value   10/28/2020 133 (H)   2012 150 (H)       (goal LDL is <100)   AST (U/L)   Date Value   10/28/2020 17     ALT (U/L)   Date Value   10/28/2020 12     BUN (mg/dL)   Date Value   10/28/2020 21     BP Readings from Last 3 Encounters:   22 118/78   22 120/76   22 122/74          ([id number])    Past Medical History:   Diagnosis Date    Asthma     Cancer of lung ([facility] [address] 2014    Diagnosed in  Check annual chest x-ray    COPD (chronic obstructive pulmonary disease) (HCC)     ESBL (extended spectrum beta-lactamase) producing bacteria infection 2018    E. Coli urine    High cholesterol 2017    Hypothyroid     Lung nodule 2018    CT scan ordered    Mild persistent asthma without complication     Need for prophylactic vaccination and inoculation against influenza 10/21/2013    Osteopenia 3/28/2017    the patient's 10-year risk for major osteoporotic fracture is 11.9% and for hip fracture is 2.1%.  S/P lobectomy of lung 2014    Seizures (HCC)       Past Surgical History:   Procedure Laterality Date    BREAST CYST EXCISION Bilateral     fibiritic    CHOLECYSTECTOMY      LUNG REMOVAL, TOTAL Right     OVARIAN CYST REMOVAL Bilateral     TONSILLECTOMY      TUBAL LIGATION         No family history on file.     Social History     Tobacco Use    Smoking status: Former Smoker     Packs/day: 0.50     Years: 23.00     Pack years: 11.50     Types: Cigarettes     Quit date: 10/7/1995     Years since quittin.5    Smokeless tobacco: Never Used   Substance Use Topics    Alcohol use: No     Alcohol/week: 0.0 standard drinks      Current Outpatient Medications   Medication Sig Dispense Refill    predniSONE (DELTASONE) 20 MG tablet Take 3 tablets x 3 days, then 2 tablets x 3 days, then 1 tablet x 3 days 18 tablet 0    guaiFENesin (MUCINEX) 600 MG extended release tablet Take 1 tablet by mouth 2 times daily for 15 days 30 tablet 0    loratadine (CLARITIN) 10 MG tablet Take 1 tablet by mouth daily 30 tablet 2    azithromycin (ZITHROMAX) 250 MG tablet Take 1 tablet by mouth See Admin Instructions for 5 days 500mg on day 1 followed by 250mg on days 2 - 5 6 tablet 0    Handicap Placard MISC by Does not apply route Expires 5 years 2 each 0    meloxicam (MOBIC) 7.5 MG tablet Take 1 tablet by mouth daily 90 tablet 1    levothyroxine (EUTHYROX) 125 MCG tablet Take 1 tablet by mouth Daily 90 tablet 1    ibuprofen (ADVIL;MOTRIN) 600 MG tablet Take 1 tablet by mouth 4 times daily as needed for Pain 120 tablet 0    cefdinir (OMNICEF) 300 MG capsule       tiotropium (SPIRIVA HANDIHALER) 18 MCG inhalation capsule Inhale 1 capsule into the lungs daily 90 capsule 5    albuterol (PROVENTIL) (2.5 MG/3ML) 0.083% nebulizer solution Take 3 mLs by nebulization every 6 hours as needed for Wheezing 120 each 3    albuterol sulfate HFA (PROAIR HFA) 108 (90 Base) MCG/ACT inhaler Inhale 2 puffs into the lungs every 6 hours as needed for Wheezing 1 Inhaler 3    gabapentin (NEURONTIN) 100 MG capsule Take 1 capsule by mouth nightly for 30 days. 30 capsule 1     Current Facility-Administered Medications   Medication Dose Route Frequency Provider Last Rate Last Admin    albuterol (ACCUNEB) nebulizer solution 0.63 mg  0.63 mg Nebulization Once Meryl Diaz MD        methylPREDNISolone acetate (DEPO-MEDROL) injection 80 mg  80 mg IntraMUSCular Once Meryl Diaz MD        methylPREDNISolone acetate (DEPO-MEDROL) injection 40 mg  40 mg IntraMUSCular Once Meryl Diaz MD         Allergies   Allergen Reactions    Benactyzine     Benadryl [Diphenhydramine]     Ciprofloxacin     Codeine     Dilantin  [Phenytoin Sodium Extended]     Dye [Iodides]     Lorazepam     Prochlorperazine Edisylate     Sulfa Antibiotics     Sulfamethoxazole-Trimethoprim     Zofran [Ondansetron Hcl] Nausea And Vomiting       Health Maintenance   Topic Date Due    Shingles Vaccine (1 of 2) Never done   [employer] Visit (AWV)  10/29/2022    Breast cancer screen  11/17/2022    Depression Screen  04/13/2023    Colorectal Cancer Screen  08/01/2023    Lipid screen  10/28/2025    DTaP/Tdap/Td vaccine (2 - Td or Tdap) 03/25/2027    DEXA (modify frequency per FRAX score)  Completed    Flu vaccine  Completed    Pneumococcal 65+ years Vaccine  Completed    COVID-19 Vaccine  Completed    Hepatitis C screen  Completed    Hepatitis A vaccine  Aged Out    Hepatitis B vaccine  Aged Out    Hib vaccine  Aged Out    Meningococcal (ACWY) vaccine  Aged Out       Subjective:      Review of Systems   Constitutional: Positive for fatigue. Negative for activity change and fever. HENT: Positive for voice change. Negative for congestion, rhinorrhea and sore throat. Eyes: Negative for visual disturbance. Respiratory: Positive for shortness of breath. Negative for chest tightness.     Cardiovascular: Negative for chest pain and palpitations. Gastrointestinal: Negative for abdominal pain, diarrhea, nausea and vomiting. Endocrine: Negative for polydipsia. Genitourinary: Negative for difficulty urinating. Musculoskeletal: Negative for arthralgias and myalgias. Skin: Negative for color change. Neurological: Negative for weakness and headaches. Psychiatric/Behavioral: Negative for behavioral problems. The patient is not nervous/anxious. All other systems reviewed and are negative. Objective:   /78   Pulse 70   Wt 148 lb (67.1 kg)   SpO2 91%   BMI 27.96 kg/m²   Physical Exam  Vitals reviewed. Constitutional:       General: She is not in acute distress. Appearance: Normal appearance. She is ill-appearing. She is not toxic-appearing or diaphoretic. HENT:      Head: Normocephalic. Eyes:      Pupils: Pupils are equal, round, and reactive to light. Cardiovascular:      Rate and Rhythm: Normal rate and regular rhythm. Pulses: Normal pulses. Heart sounds: Normal heart sounds. Pulmonary:      Effort: Pulmonary effort is normal.      Breath sounds: Wheezing present. No rhonchi. Abdominal:      General: There is no distension. Musculoskeletal:         General: Normal range of motion. Cervical back: Neck supple. Right lower leg: No edema. Left lower leg: No edema. Lymphadenopathy:      Cervical: No cervical adenopathy. Skin:     General: Skin is warm and dry. Capillary Refill: Capillary refill takes less than 2 seconds. Neurological:      General: No focal deficit present. Mental Status: She is alert and oriented to person, place, and time. Psychiatric:         Mood and Affect: Mood normal.         Behavior: Behavior normal.           :       Diagnosis Orders   1. COPD with acute exacerbation (HCC)  predniSONE (DELTASONE) 20 MG tablet    guaiFENesin (MUCINEX) 600 MG extended release tablet    loratadine (CLARITIN) 10 MG tablet   2.  Upper respiratory tract infection, unspecified type  azithromycin (ZITHROMAX) 250 MG tablet   3. S/P lobectomy of lung  Handicap Placard MISC   4. [facility]  Handicap Placard MISC   5. Short of breath on exertion  Handicap Placard MISC             :          1. COPD with acute exacerbation (HCC)  2. Upper respiratory tract infection, unspecified type  New, exacerbation may be due to recent acute illness, rx for prednisone taper, claritin, mucinex and zpack. Continue to monitor and f/u with pulm as scheduled. Discussed red flag complaints, VSS, spo2 91% on room air- if lower than this, pt instructed to go to ED. Continue nebulizer at home as needed with deep breathing.     - predniSONE (DELTASONE) 20 MG tablet; Take 3 tablets x 3 days, then 2 tablets x 3 days, then 1 tablet x 3 days  Dispense: 18 tablet; Refill: 0  - guaiFENesin (MUCINEX) 600 MG extended release tablet; Take 1 tablet by mouth 2 times daily for 15 days  Dispense: 30 tablet; Refill: 0  - loratadine (CLARITIN) 10 MG tablet; Take 1 tablet by mouth daily  Dispense: 30 tablet; Refill: 2    3. S/P lobectomy of lung  4. Chronic obstructive pulmonary disease, unspecified COPD type ([facility]  5. Short of breath on exertion  Handicap placard renewed, following with pulm     - Handicap Placard MISC; by Does not apply route Expires 5 years  Dispense: 2 each; Refill: 0    Return if symptoms worsen or fail to improve. Patient given educational materials - see patient instructions. Discussed use, benefit, and side effects of prescribed medications. All patient questions answered. Pt voiced understanding. Reviewed health maintenance. Instructed to continue current medications, diet and exercise. Patient agreed with treatment plan. Follow up as directed.      Electronicallysigned by SORAYA Ornelas CNP on 4/15/2022 at 9:36 AM

## 2022-04-13 NOTE — PROGRESS NOTES
[address] [facility] PRIMARY CARE  [facility] [address]  [address] [address]  Dept: [phone]  Dept Fax: [phone]    Elvia Wilson is a 80 y.o. female who presentstoday for her medical conditions/complaints as noted below. Elvia Wilson is c/o of  Chief Complaint   Patient presents with    Cough     dry, loss of voice, fatigue, nausea x2 days. denies fever, body aches, GI issues         HPI:     HPI    Hemoglobin A1C (%)   Date Value   01/24/2012 5.5             ( goal A1C is < 7)   No results found for: LABMICR  LDL Cholesterol (mg/dL)   Date Value   10/28/2020 133 (H)   01/24/2012 150 (H)       (goal LDL is <100)   AST (U/L)   Date Value   10/28/2020 17     ALT (U/L)   Date Value   10/28/2020 12     BUN (mg/dL)   Date Value   10/28/2020 21     BP Readings from Last 3 Encounters:   04/13/22 118/78   03/16/22 120/76   02/24/22 122/74          ([id number])    Past Medical History:   Diagnosis Date    Asthma     Cancer of lung ([facility] [address] 1/27/2014    Diagnosed in 1995 Check annual chest x-ray    COPD (chronic obstructive pulmonary disease) (HCC)     ESBL (extended spectrum beta-lactamase) producing bacteria infection 07/24/2018    E. Coli urine    High cholesterol 1/23/2017    Hypothyroid     Lung nodule 1/30/2018    CT scan ordered    Mild persistent asthma without complication 9/94/8931    Need for prophylactic vaccination and inoculation against influenza 10/21/2013    Osteopenia 3/28/2017    the patient's 10-year risk for major osteoporotic fracture is 11.9% and for hip fracture is 2.1%.  S/P lobectomy of lung 1/27/2014    Seizures (HCC)       Past Surgical History:   Procedure Laterality Date    BREAST CYST EXCISION Bilateral     fibiritic    CHOLECYSTECTOMY      LUNG REMOVAL, TOTAL Right     OVARIAN CYST REMOVAL Bilateral     TONSILLECTOMY      TUBAL LIGATION         No family history on file.     Social History     Tobacco Use    Smoking status: Former Smoker     Packs/day: 0.50     Years: 23.00     Pack years: 11.50     Types: Cigarettes     Quit date: 10/7/1995     Years since quittin.5    Smokeless tobacco: Never Used   Substance Use Topics    Alcohol use: No     Alcohol/week: 0.0 standard drinks      Current Outpatient Medications   Medication Sig Dispense Refill    predniSONE (DELTASONE) 20 MG tablet Take 3 tablets x 3 days, then 2 tablets x 3 days, then 1 tablet x 3 days 18 tablet 0    guaiFENesin (MUCINEX) 600 MG extended release tablet Take 1 tablet by mouth 2 times daily for 15 days 30 tablet 0    loratadine (CLARITIN) 10 MG tablet Take 1 tablet by mouth daily 30 tablet 2    azithromycin (ZITHROMAX) 250 MG tablet Take 1 tablet by mouth See Admin Instructions for 5 days 500mg on day 1 followed by 250mg on days 2 - 5 6 tablet 0    Handicap Placard MISC by Does not apply route Expires 5 years 2 each 0    meloxicam (MOBIC) 7.5 MG tablet Take 1 tablet by mouth daily 90 tablet 1    levothyroxine (EUTHYROX) 125 MCG tablet Take 1 tablet by mouth Daily 90 tablet 1    ibuprofen (ADVIL;MOTRIN) 600 MG tablet Take 1 tablet by mouth 4 times daily as needed for Pain 120 tablet 0    cefdinir (OMNICEF) 300 MG capsule       tiotropium (SPIRIVA HANDIHALER) 18 MCG inhalation capsule Inhale 1 capsule into the lungs daily 90 capsule 5    albuterol (PROVENTIL) (2.5 MG/3ML) 0.083% nebulizer solution Take 3 mLs by nebulization every 6 hours as needed for Wheezing 120 each 3    albuterol sulfate HFA (PROAIR HFA) 108 (90 Base) MCG/ACT inhaler Inhale 2 puffs into the lungs every 6 hours as needed for Wheezing 1 Inhaler 3    gabapentin (NEURONTIN) 100 MG capsule Take 1 capsule by mouth nightly for 30 days.  30 capsule 1     Current Facility-Administered Medications   Medication Dose Route Frequency Provider Last Rate Last Admin    albuterol (ACCUNEB) nebulizer solution 0.63 mg  0.63 mg Nebulization Once Kory Washington MD        methylPREDNISolone acetate (DEPO-MEDROL) injection 80 mg  80 mg IntraMUSCular Once Rohan Lizarraga MD        methylPREDNISolone acetate (DEPO-MEDROL) injection 40 mg  40 mg IntraMUSCular Once Rohan Lizarraga MD         Allergies   Allergen Reactions    Benactyzine     Benadryl [Diphenhydramine]     Ciprofloxacin     Codeine     Dilantin  [Phenytoin Sodium Extended]     Dye [Iodides]     Lorazepam     Prochlorperazine Edisylate     Sulfa Antibiotics     Sulfamethoxazole-Trimethoprim     Zofran [Ondansetron Hcl] Nausea And Vomiting       Health Maintenance   Topic Date Due    Shingles Vaccine (1 of 2) Never done    COVID-19 Vaccine (3 - Booster for Candice Ok series) 08/26/2021    Annual Wellness Visit (AWV)  10/29/2022    Breast cancer screen  11/17/2022    Depression Screen  03/16/2023    Colorectal Cancer Screen  08/01/2023    Lipid screen  10/28/2025    DTaP/Tdap/Td vaccine (2 - Td or Tdap) 03/25/2027    DEXA (modify frequency per FRAX score)  Completed    Flu vaccine  Completed    Pneumococcal 65+ years Vaccine  Completed    Hepatitis C screen  Completed    Hepatitis A vaccine  Aged Out    Hepatitis B vaccine  Aged Out    Hib vaccine  Aged Out    Meningococcal (ACWY) vaccine  Aged Out       Subjective:      Review of Systems    Objective:   /78   Pulse 70   Wt 148 lb (67.1 kg)   SpO2 91%   BMI 27.96 kg/m²   Physical Exam      :       Diagnosis Orders   1. COPD with acute exacerbation (HCC)  predniSONE (DELTASONE) 20 MG tablet    guaiFENesin (MUCINEX) 600 MG extended release tablet    loratadine (CLARITIN) 10 MG tablet   2. Upper respiratory tract infection, unspecified type  azithromycin (ZITHROMAX) 250 MG tablet   3. S/P lobectomy of lung  Handicap Placard MISC   4. Chronic obstructive pulmonary disease, unspecified COPD type ([facility] [address]  Handicap Placard MISC   5.  Short of breath on exertion  [facility]             :          1. COPD with acute exacerbation (HCC)  ***  - predniSONE (DELTASONE) 20 MG tablet; Take 3 tablets x 3 days, then 2 tablets x 3 days, then 1 tablet x 3 days  Dispense: 18 tablet; Refill: 0  - guaiFENesin (MUCINEX) 600 MG extended release tablet; Take 1 tablet by mouth 2 times daily for 15 days  Dispense: 30 tablet; Refill: 0  - loratadine (CLARITIN) 10 MG tablet; Take 1 tablet by mouth daily  Dispense: 30 tablet; Refill: 2    2. Upper respiratory tract infection, unspecified type  ***  - azithromycin (ZITHROMAX) 250 MG tablet; Take 1 tablet by mouth See Admin Instructions for 5 days 500mg on day 1 followed by 250mg on days 2 - 5  Dispense: 6 tablet; Refill: 0    3. S/P lobectomy of lung  ***  - Handicap Placard MISC; by Does not apply route Expires 5 years  Dispense: 2 each; Refill: 0    4. [facility]  ***  - Handicap Placard MISC; by Does not apply route Expires 5 years  Dispense: 2 each; Refill: 0    5. Short of breath on exertion  ***  - Handicap Placard MISC; by Does not apply route Expires 5 years  Dispense: 2 each; Refill: 0      Return if symptoms worsen or fail to improve. Patient given educational materials - see patient instructions. Discussed use, benefit, and side effects of prescribed medications. All patient questions answered. Pt voiced understanding. Reviewed health maintenance. Instructed to continue current medications, diet and exercise. Patient agreed with treatment plan. Follow up as directed.        Electronicallysigned by SORAYA Loving CNP on 4/13/2022 at 3:35 PM

## 2022-04-15 ASSESSMENT — ENCOUNTER SYMPTOMS
ABDOMINAL PAIN: 0
SORE THROAT: 0
NAUSEA: 0
COLOR CHANGE: 0
SHORTNESS OF BREATH: 1
VOICE CHANGE: 1
CHEST TIGHTNESS: 0
RHINORRHEA: 0
DIARRHEA: 0
VOMITING: 0

## 2022-04-22 ENCOUNTER — HOSPITAL ENCOUNTER (OUTPATIENT)
Age: 72
Setting detail: SPECIMEN
Discharge: HOME | End: 2022-04-22

## 2022-04-22 DIAGNOSIS — Z78.0 POST-MENOPAUSAL: ICD-10-CM

## 2022-04-22 DIAGNOSIS — E55.9 VITAMIN D DEFICIENCY: ICD-10-CM

## 2022-04-22 DIAGNOSIS — Z13.0 SCREENING, ANEMIA, DEFICIENCY, IRON: ICD-10-CM

## 2022-04-22 DIAGNOSIS — Z13.1 SCREENING FOR DIABETES MELLITUS: ICD-10-CM

## 2022-04-22 DIAGNOSIS — E03.9 HYPOTHYROIDISM, UNSPECIFIED TYPE: ICD-10-CM

## 2022-04-22 DIAGNOSIS — Z13.6 SCREENING FOR CARDIOVASCULAR CONDITION: ICD-10-CM

## 2022-04-22 LAB
ABSOLUTE EOS #: 0.19 K/UL (ref 0–0.44)
ABSOLUTE IMMATURE GRANULOCYTE: 0.1 K/UL (ref 0–0.3)
ABSOLUTE LYMPH #: 1.57 K/UL (ref 1.1–3.7)
ABSOLUTE MONO #: 0.83 K/UL (ref 0.1–1.2)
ALBUMIN SERPL-MCNC: 4 G/DL (ref 3.5–5.2)
ALBUMIN/GLOBULIN RATIO: 1.3 (ref 1–2.5)
ALP BLD-CCNC: 112 U/L (ref 35–104)
ALT SERPL-CCNC: 11 U/L (ref 5–33)
ANION GAP SERPL CALCULATED.3IONS-SCNC: 15 MMOL/L (ref 9–17)
AST SERPL-CCNC: 14 U/L
BASOPHILS # BLD: 1 % (ref 0–2)
BASOPHILS ABSOLUTE: 0.05 K/UL (ref 0–0.2)
BILIRUB SERPL-MCNC: 0.4 MG/DL (ref 0.3–1.2)
BUN BLDV-MCNC: 11 MG/DL (ref 8–23)
CALCIUM SERPL-MCNC: 9.9 MG/DL (ref 8.6–10.4)
CHLORIDE BLD-SCNC: 105 MMOL/L (ref 98–107)
CHOLESTEROL/HDL RATIO: 3.6
CHOLESTEROL: 193 MG/DL
CO2: 25 MMOL/L (ref 20–31)
CREAT SERPL-MCNC: 0.67 MG/DL (ref 0.5–0.9)
EOSINOPHILS RELATIVE PERCENT: 3 % (ref 1–4)
GFR AFRICAN AMERICAN: >60 ML/MIN
GFR NON-AFRICAN AMERICAN: >60 ML/MIN
GFR SERPL CREATININE-BSD FRML MDRD: ABNORMAL ML/MIN/{1.73_M2}
GLUCOSE BLD-MCNC: 92 MG/DL (ref 70–99)
HCT VFR BLD CALC: 45.8 % (ref 36.3–47.1)
HDLC SERPL-MCNC: 53 MG/DL
HEMOGLOBIN: 14 G/DL (ref 11.9–15.1)
IMMATURE GRANULOCYTES: 1 %
LDL CHOLESTEROL: 114 MG/DL (ref 0–130)
LYMPHOCYTES # BLD: 22 % (ref 24–43)
MCH RBC QN AUTO: 27.5 PG (ref 25.2–33.5)
MCHC RBC AUTO-ENTMCNC: 30.6 G/DL (ref 28.4–34.8)
MCV RBC AUTO: 89.8 FL (ref 82.6–102.9)
MONOCYTES # BLD: 12 % (ref 3–12)
NRBC AUTOMATED: 0 PER 100 WBC
PDW BLD-RTO: 13 % (ref 11.8–14.4)
PLATELET # BLD: ABNORMAL K/UL (ref 138–453)
PLATELET, FLUORESCENCE: 258 K/UL (ref 138–453)
PLATELET, IMMATURE FRACTION: 5.7 % (ref 1.1–10.3)
POTASSIUM SERPL-SCNC: 4.4 MMOL/L (ref 3.7–5.3)
RBC # BLD: 5.1 M/UL (ref 3.95–5.11)
SEG NEUTROPHILS: 62 % (ref 36–65)
SEGMENTED NEUTROPHILS ABSOLUTE COUNT: 4.42 K/UL (ref 1.5–8.1)
SODIUM BLD-SCNC: 145 MMOL/L (ref 135–144)
THYROXINE, FREE: 1.99 NG/DL (ref 0.93–1.7)
TOTAL PROTEIN: 7 G/DL (ref 6.4–8.3)
TRIGL SERPL-MCNC: 128 MG/DL
TSH SERPL DL<=0.05 MIU/L-ACNC: 0.05 UIU/ML (ref 0.3–5)
VITAMIN D 25-HYDROXY: 16.6 NG/ML
WBC # BLD: 7.2 K/UL (ref 3.5–11.3)

## 2022-04-26 DIAGNOSIS — E55.9 VITAMIN D DEFICIENCY: Primary | ICD-10-CM

## 2022-04-26 DIAGNOSIS — E03.9 HYPOTHYROIDISM, UNSPECIFIED TYPE: ICD-10-CM

## 2022-04-26 RX ORDER — ERGOCALCIFEROL 1.25 MG/1
50000 CAPSULE ORAL WEEKLY
Qty: 12 CAPSULE | Refills: 1 | Status: SHIPPED | OUTPATIENT
Start: 2022-04-26

## 2022-04-26 RX ORDER — LEVOTHYROXINE SODIUM 0.1 MG/1
100 TABLET ORAL DAILY
Qty: 90 TABLET | Refills: 1 | Status: SHIPPED | OUTPATIENT
Start: 2022-04-26 | End: 2022-06-13

## 2022-04-29 ENCOUNTER — OFFICE VISIT (OUTPATIENT)
Dept: PRIMARY CARE CLINIC | Age: 72
End: 2022-04-29
Payer: MEDICARE

## 2022-04-29 VITALS
HEART RATE: 55 BPM | WEIGHT: 146 LBS | SYSTOLIC BLOOD PRESSURE: 100 MMHG | DIASTOLIC BLOOD PRESSURE: 66 MMHG | BODY MASS INDEX: 27.59 KG/M2 | OXYGEN SATURATION: 96 %

## 2022-04-29 DIAGNOSIS — R53.83 FATIGUE, UNSPECIFIED TYPE: ICD-10-CM

## 2022-04-29 DIAGNOSIS — J44.9 CHRONIC OBSTRUCTIVE PULMONARY DISEASE, UNSPECIFIED COPD TYPE (HCC): ICD-10-CM

## 2022-04-29 DIAGNOSIS — J45.30 MILD PERSISTENT ASTHMA WITHOUT COMPLICATION: Primary | ICD-10-CM

## 2022-04-29 DIAGNOSIS — R00.1 BRADYCARDIA: ICD-10-CM

## 2022-04-29 DIAGNOSIS — M25.512 CHRONIC LEFT SHOULDER PAIN: ICD-10-CM

## 2022-04-29 DIAGNOSIS — G89.29 CHRONIC LEFT SHOULDER PAIN: ICD-10-CM

## 2022-04-29 PROCEDURE — 3023F SPIROM DOC REV: CPT | Performed by: NURSE PRACTITIONER

## 2022-04-29 PROCEDURE — G8399 PT W/DXA RESULTS DOCUMENT: HCPCS | Performed by: NURSE PRACTITIONER

## 2022-04-29 PROCEDURE — G8417 CALC BMI ABV UP PARAM F/U: HCPCS | Performed by: NURSE PRACTITIONER

## 2022-04-29 PROCEDURE — 1036F TOBACCO NON-USER: CPT | Performed by: NURSE PRACTITIONER

## 2022-04-29 PROCEDURE — 1123F ACP DISCUSS/DSCN MKR DOCD: CPT | Performed by: NURSE PRACTITIONER

## 2022-04-29 PROCEDURE — 3017F COLORECTAL CA SCREEN DOC REV: CPT | Performed by: NURSE PRACTITIONER

## 2022-04-29 PROCEDURE — 1090F PRES/ABSN URINE INCON ASSESS: CPT | Performed by: NURSE PRACTITIONER

## 2022-04-29 PROCEDURE — 99214 OFFICE O/P EST MOD 30 MIN: CPT | Performed by: NURSE PRACTITIONER

## 2022-04-29 PROCEDURE — 4040F PNEUMOC VAC/ADMIN/RCVD: CPT | Performed by: NURSE PRACTITIONER

## 2022-04-29 PROCEDURE — G8427 DOCREV CUR MEDS BY ELIG CLIN: HCPCS | Performed by: NURSE PRACTITIONER

## 2022-04-29 RX ORDER — IBUPROFEN 600 MG/1
600 TABLET ORAL 2 TIMES DAILY PRN
Qty: 90 TABLET | Refills: 1 | Status: SHIPPED | OUTPATIENT
Start: 2022-04-29

## 2022-04-29 ASSESSMENT — ENCOUNTER SYMPTOMS
NAUSEA: 0
CHEST TIGHTNESS: 0
COLOR CHANGE: 0
SHORTNESS OF BREATH: 1
ABDOMINAL PAIN: 0
DIARRHEA: 0
VOMITING: 0
RHINORRHEA: 0
SORE THROAT: 0

## 2022-04-29 ASSESSMENT — PATIENT HEALTH QUESTIONNAIRE - PHQ9
2. FEELING DOWN, DEPRESSED OR HOPELESS: 0
SUM OF ALL RESPONSES TO PHQ QUESTIONS 1-9: 0
SUM OF ALL RESPONSES TO PHQ9 QUESTIONS 1 & 2: 0
SUM OF ALL RESPONSES TO PHQ QUESTIONS 1-9: 0
1. LITTLE INTEREST OR PLEASURE IN DOING THINGS: 0
SUM OF ALL RESPONSES TO PHQ QUESTIONS 1-9: 0
SUM OF ALL RESPONSES TO PHQ QUESTIONS 1-9: 0

## 2022-04-29 NOTE — PROGRESS NOTES
[facility] PRIMARY CARE  [facility] [address]  [address] [address]  Dept: [phone]  Dept Fax: [phone]    Alejandro Goldman is a 80 y.o. female who presentstoday for her medical conditions/complaints as noted below. Alejandro Goldman is c/o of  Chief Complaint   Patient presents with    COPD     asthma f/u. improved after zpak         HPI:     Here for routine follow up for COPD/asthma and recheck after URI causing exacerbation of respiratory symptoms. She is feeling improved overall after completing zpack, did not have much improvement with just steroid course alone. SpO2 improved today. She remains on spiriva and albuterol PRN. She continues to have some fatigue. Today she is slightly bradycardic and slightly hypotensive. She denies CP, SOB that is different from her usual, leg swelling, or dizziness. This is new for her. She does admit to not drinking much water, prefers tea but we have discussed caffeine intake and dehydration in past.     She would like a refill of her ibuprofen, using intermittently for chronic left shoulder pain. Denies any new or worsening symptoms. She still plans to see Dr. Edel Davis, but is waiting due to her daughter having workup for breast cancer currently.        Hemoglobin A1C (%)   Date Value   01/24/2012 5.5             ( goal A1C is < 7)   No results found for: LABMICR  LDL Cholesterol (mg/dL)   Date Value   04/22/2022 114   10/28/2020 133 (H)   01/24/2012 150 (H)       (goal LDL is <100)   AST (U/L)   Date Value   04/22/2022 14     ALT (U/L)   Date Value   04/22/2022 11     BUN (mg/dL)   Date Value   04/22/2022 11     BP Readings from Last 3 Encounters:   04/29/22 100/66   04/13/22 118/78   03/16/22 120/76          ([id number])    Past Medical History:   Diagnosis Date    Asthma     Cancer of lung ([facility] [address] 1/27/2014    Diagnosed in [facility]. 49 annual chest x-ray    COPD (chronic obstructive pulmonary disease) (HCC)     ESBL (extended spectrum beta-lactamase) producing bacteria infection 2018    E. Coli urine    High cholesterol 2017    Hypothyroid     Lung nodule 2018    CT scan ordered    Mild persistent asthma without complication 3/13/1373    Need for prophylactic vaccination and inoculation against influenza 10/21/2013    Osteopenia 3/28/2017    the patient's 10-year risk for major osteoporotic fracture is 11.9% and for hip fracture is 2.1%.  S/P lobectomy of lung 2014    Seizures (HCC)       Past Surgical History:   Procedure Laterality Date    BREAST CYST EXCISION Bilateral     fibiritic    CHOLECYSTECTOMY      LUNG REMOVAL, TOTAL Right     OVARIAN CYST REMOVAL Bilateral     TONSILLECTOMY      TUBAL LIGATION         No family history on file.     Social History     Tobacco Use    Smoking status: Former Smoker     Packs/day: 0.50     Years: 23.00     Pack years: 11.50     Types: Cigarettes     Quit date: 10/7/1995     Years since quittin.5    Smokeless tobacco: Never Used   Substance Use Topics    Alcohol use: No     Alcohol/week: 0.0 standard drinks      Current Outpatient Medications   Medication Sig Dispense Refill    ibuprofen (ADVIL;MOTRIN) 600 MG tablet Take 1 tablet by mouth 2 times daily as needed for Pain 90 tablet 1    levothyroxine (EUTHYROX) 100 MCG tablet Take 1 tablet by mouth Daily 90 tablet 1    vitamin D (ERGOCALCIFEROL) 1.25 MG (09167 UT) CAPS capsule Take 1 capsule by mouth once a week 12 capsule 1    predniSONE (DELTASONE) 20 MG tablet Take 3 tablets x 3 days, then 2 tablets x 3 days, then 1 tablet x 3 days 18 tablet 0    loratadine (CLARITIN) 10 MG tablet Take 1 tablet by mouth daily 30 tablet 2    Handicap Placard MISC by Does not apply route Expires 5 years 2 each 0    meloxicam (MOBIC) 7.5 MG tablet Take 1 tablet by mouth daily 90 tablet 1    cefdinir (OMNICEF) 300 MG capsule       tiotropium (SPIRIVA HANDIHALER) 18 MCG inhalation capsule Inhale 1 capsule into the lungs daily 90 capsule 5    albuterol (PROVENTIL) (2.5 MG/3ML) 0.083% nebulizer solution Take 3 mLs by nebulization every 6 hours as needed for Wheezing 120 each 3    albuterol sulfate HFA (PROAIR HFA) 108 (90 Base) MCG/ACT inhaler Inhale 2 puffs into the lungs every 6 hours as needed for Wheezing 1 Inhaler 3    gabapentin (NEURONTIN) 100 MG capsule Take 1 capsule by mouth nightly for 30 days. 30 capsule 1     Current Facility-Administered Medications   Medication Dose Route Frequency Provider Last Rate Last Admin    albuterol (ACCUNEB) nebulizer solution 0.63 mg  0.63 mg Nebulization Once Mary Lou Marina MD         Allergies   Allergen Reactions    Benactyzine     Benadryl [Diphenhydramine]     Ciprofloxacin     Codeine     Dilantin  [Phenytoin Sodium Extended]     Dye [Iodides]     Lorazepam     Prochlorperazine Edisylate     Sulfa Antibiotics     Sulfamethoxazole-Trimethoprim     Zofran [Ondansetron Hcl] Nausea And Vomiting       Health Maintenance   Topic Date Due    Shingles vaccine (1 of 2) 04/29/2023 (Originally 5/16/2001)    Annual Wellness Visit (AWV)  10/29/2022    Breast cancer screen  11/17/2022    Depression Screen  04/13/2023    Colorectal Cancer Screen  08/01/2023    DTaP/Tdap/Td vaccine (2 - Td or Tdap) 03/25/2027    Lipids  04/22/2027    DEXA (modify frequency per FRAX score)  Completed    Flu vaccine  Completed    Pneumococcal 65+ years Vaccine  Completed    COVID-19 Vaccine  Completed    Hepatitis C screen  Completed    Hepatitis A vaccine  Aged Out    Hepatitis B vaccine  Aged Out    Hib vaccine  Aged Out    Meningococcal (ACWY) vaccine  Aged Out       Subjective:      Review of Systems   Constitutional: Negative for activity change, fatigue and fever. HENT: Negative for congestion, rhinorrhea and sore throat. Eyes: Negative for visual disturbance. Respiratory: Positive for shortness of breath (intermittent, at baseline ).  Negative for chest tightness. Cardiovascular: Negative for chest pain and palpitations. Gastrointestinal: Negative for abdominal pain, diarrhea, nausea and vomiting. Endocrine: Negative for polydipsia. Genitourinary: Negative for difficulty urinating. Musculoskeletal: Negative for arthralgias and myalgias. Skin: Negative for color change. Neurological: Negative for weakness and headaches. Psychiatric/Behavioral: Negative for behavioral problems. The patient is not nervous/anxious. All other systems reviewed and are negative. Objective:   /66   Pulse 55   Wt 146 lb (66.2 kg)   SpO2 96%   BMI 27.59 kg/m²   Physical Exam  Vitals reviewed. Constitutional:       General: She is not in acute distress. Appearance: Normal appearance. HENT:      Head: Normocephalic. Eyes:      Pupils: Pupils are equal, round, and reactive to light. Cardiovascular:      Rate and Rhythm: Normal rate and regular rhythm. Pulses: Normal pulses. Heart sounds: Normal heart sounds. Pulmonary:      Effort: Pulmonary effort is normal.      Breath sounds: Normal breath sounds. No wheezing. Abdominal:      General: There is no distension. Musculoskeletal:         General: Normal range of motion. Cervical back: Neck supple. Right lower leg: No edema. Left lower leg: No edema. Lymphadenopathy:      Cervical: No cervical adenopathy. Skin:     General: Skin is warm and dry. Capillary Refill: Capillary refill takes less than 2 seconds. Neurological:      General: No focal deficit present. Mental Status: She is alert and oriented to person, place, and time. Psychiatric:         Mood and Affect: Mood normal.         Behavior: Behavior normal.           :       Diagnosis Orders   1. Mild persistent asthma without complication     2. Chronic obstructive pulmonary disease, unspecified COPD type ([facility] [address]     3. Fatigue, unspecified type  EKG 12 Lead   4. Bradycardia  EKG 12 Lead   5.  Chronic left shoulder pain  ibuprofen (ADVIL;MOTRIN) 600 MG tablet             :          1. Mild persistent asthma without complication  2. Chronic obstructive pulmonary disease, unspecified COPD type ([address]  Stable, back to baseline following acute exacerbation with URI. Has f/u with pulmonology in August. Continue spiriva and albuterol PRN. 3. Fatigue, unspecified type  4. Bradycardia  New, fatigue may be related to recent acute illness but will check EKG due to bradycardia and associated fatigue.   - EKG 12 Lead; Future    5. Chronic left shoulder pain  Stable, continue ibuprofen PRN and f/u with Dr. Shun Lambert when able. - ibuprofen (ADVIL;MOTRIN) 600 MG tablet; Take 1 tablet by mouth 2 times daily as needed for Pain  Dispense: 90 tablet; Refill: 1    Return in about 6 weeks (around 6/10/2022). Patient given educational materials - see patient instructions. Discussed use, benefit, and side effects of prescribed medications. All patient questions answered. Pt voiced understanding. Reviewed health maintenance. Instructed to continue current medications, diet and exercise. Patient agreed with treatment plan. Follow up as directed.        Electronicallysigned by SORAYA Maria CNP on 4/29/2022 at 1:11 PM

## 2022-04-29 NOTE — PATIENT INSTRUCTIONS
Care Instructions. \"     If you do not have an account, please click on the \"Sign Up Now\" link. Current as of: July 1, 2021               Content Version: 13.2  © 2006-2022 Healthwise, Incorporated. Care instructions adapted under license by [facility] ([facility]). If you have questions about a medical condition or this instruction, always ask your healthcare professional. [address] any warranty or liability for your use of this information.

## 2022-06-13 ENCOUNTER — OFFICE VISIT (OUTPATIENT)
Dept: PRIMARY CARE CLINIC | Age: 72
End: 2022-06-13
Payer: MEDICARE

## 2022-06-13 VITALS
OXYGEN SATURATION: 93 % | HEART RATE: 48 BPM | DIASTOLIC BLOOD PRESSURE: 72 MMHG | SYSTOLIC BLOOD PRESSURE: 108 MMHG | BODY MASS INDEX: 28.34 KG/M2 | WEIGHT: 150 LBS

## 2022-06-13 DIAGNOSIS — M79.622 PAIN IN LEFT UPPER ARM: ICD-10-CM

## 2022-06-13 DIAGNOSIS — J44.9 CHRONIC OBSTRUCTIVE PULMONARY DISEASE, UNSPECIFIED COPD TYPE (HCC): ICD-10-CM

## 2022-06-13 DIAGNOSIS — G56.92 NEUROPATHY OF LEFT UPPER EXTREMITY: ICD-10-CM

## 2022-06-13 DIAGNOSIS — J45.30 MILD PERSISTENT ASTHMA WITHOUT COMPLICATION: ICD-10-CM

## 2022-06-13 DIAGNOSIS — M25.612 DECREASED ROM OF LEFT SHOULDER: Primary | ICD-10-CM

## 2022-06-13 DIAGNOSIS — R52 BURNING PAIN: ICD-10-CM

## 2022-06-13 PROCEDURE — 1123F ACP DISCUSS/DSCN MKR DOCD: CPT | Performed by: NURSE PRACTITIONER

## 2022-06-13 PROCEDURE — G8417 CALC BMI ABV UP PARAM F/U: HCPCS | Performed by: NURSE PRACTITIONER

## 2022-06-13 PROCEDURE — 1090F PRES/ABSN URINE INCON ASSESS: CPT | Performed by: NURSE PRACTITIONER

## 2022-06-13 PROCEDURE — 3017F COLORECTAL CA SCREEN DOC REV: CPT | Performed by: NURSE PRACTITIONER

## 2022-06-13 PROCEDURE — G8427 DOCREV CUR MEDS BY ELIG CLIN: HCPCS | Performed by: NURSE PRACTITIONER

## 2022-06-13 PROCEDURE — G8399 PT W/DXA RESULTS DOCUMENT: HCPCS | Performed by: NURSE PRACTITIONER

## 2022-06-13 PROCEDURE — 3023F SPIROM DOC REV: CPT | Performed by: NURSE PRACTITIONER

## 2022-06-13 PROCEDURE — 99214 OFFICE O/P EST MOD 30 MIN: CPT | Performed by: NURSE PRACTITIONER

## 2022-06-13 PROCEDURE — 1036F TOBACCO NON-USER: CPT | Performed by: NURSE PRACTITIONER

## 2022-06-13 RX ORDER — LEVOTHYROXINE SODIUM 0.12 MG/1
TABLET ORAL
COMMUNITY
Start: 2022-05-06

## 2022-06-13 RX ORDER — GABAPENTIN 100 MG/1
100 CAPSULE ORAL 3 TIMES DAILY
Qty: 90 CAPSULE | Refills: 1 | Status: SHIPPED | OUTPATIENT
Start: 2022-06-13 | End: 2022-07-13

## 2022-06-13 ASSESSMENT — PATIENT HEALTH QUESTIONNAIRE - PHQ9
SUM OF ALL RESPONSES TO PHQ QUESTIONS 1-9: 0
SUM OF ALL RESPONSES TO PHQ QUESTIONS 1-9: 0
SUM OF ALL RESPONSES TO PHQ9 QUESTIONS 1 & 2: 0
SUM OF ALL RESPONSES TO PHQ QUESTIONS 1-9: 0
1. LITTLE INTEREST OR PLEASURE IN DOING THINGS: 0
2. FEELING DOWN, DEPRESSED OR HOPELESS: 0
SUM OF ALL RESPONSES TO PHQ QUESTIONS 1-9: 0

## 2022-06-13 NOTE — PROGRESS NOTES
[facility] PRIMARY CARE  [address]  [address] [address]  Dept: [phone]  Dept Fax: [phone]    Wild Bell is a 71 y.o. female who presentstoday for her medical conditions/complaints as noted below. Wild Bell is c/o of  Chief Complaint   Patient presents with    Shoulder Pain     refill    COPD     imprived          HPI:     Here for 6 week recheck after COPD/asthma exacerbation after URI  She is significantly improved and feels back to her baseline, using spiriva and albuterol  Has open order for EKG for bradycardia, she is asymptomatic but did endorse fatigue at last OV  Continues to complain of chronic left shoulder pain and has associated limited ROM x 6 months with unremarkable XR. Denies neck pain. Has open order to see ortho but has put off due to caring for family. She has intermittent burning pain in left upper arm, which is worsening. No other concerns today         Hemoglobin A1C (%)   Date Value   01/24/2012 5.5             ( goal A1C is < 7)   No results found for: LABMICR  LDL Cholesterol (mg/dL)   Date Value   04/22/2022 114   10/28/2020 133 (H)   01/24/2012 150 (H)       (goal LDL is <100)   AST (U/L)   Date Value   04/22/2022 14     ALT (U/L)   Date Value   04/22/2022 11     BUN (mg/dL)   Date Value   04/22/2022 11     BP Readings from Last 3 Encounters:   06/13/22 108/72   04/29/22 100/66   04/13/22 118/78          ([id number])    Past Medical History:   Diagnosis Date    Asthma     Cancer of lung ([facility] [address] 1/27/2014    Diagnosed in [facility]. 49 annual chest x-ray    COPD (chronic obstructive pulmonary disease) (HCC)     ESBL (extended spectrum beta-lactamase) producing bacteria infection 07/24/2018    E.  Coli urine    High cholesterol 1/23/2017    Hypothyroid     Lung nodule 1/30/2018    CT scan ordered    Mild persistent asthma without complication 7/77/0893    Need for prophylactic vaccination and inoculation against influenza 10/21/2013    Osteopenia 3/28/2017    the patient's 10-year risk for major osteoporotic fracture is 11.9% and for hip fracture is 2.1%.  S/P lobectomy of lung 2014    Seizures (HCC)       Past Surgical History:   Procedure Laterality Date    BREAST CYST EXCISION Bilateral     fibiritic    CHOLECYSTECTOMY      LUNG REMOVAL, TOTAL Right     OVARIAN CYST REMOVAL Bilateral     TONSILLECTOMY      TUBAL LIGATION         No family history on file. Social History     Tobacco Use    Smoking status: Former Smoker     Packs/day: 0.50     Years: 23.00     Pack years: 11.50     Types: Cigarettes     Quit date: 10/7/1995     Years since quittin.7    Smokeless tobacco: Never Used   Substance Use Topics    Alcohol use: No     Alcohol/week: 0.0 standard drinks      Current Outpatient Medications   Medication Sig Dispense Refill    gabapentin (NEURONTIN) 100 MG capsule Take 1 capsule by mouth 3 times daily for 30 days.  90 capsule 1    ibuprofen (ADVIL;MOTRIN) 600 MG tablet Take 1 tablet by mouth 2 times daily as needed for Pain 90 tablet 1    vitamin D (ERGOCALCIFEROL) 1.25 MG (93862 UT) CAPS capsule Take 1 capsule by mouth once a week 12 capsule 1    loratadine (CLARITIN) 10 MG tablet Take 1 tablet by mouth daily 30 tablet 2    Handicap Placard MISC by Does not apply route Expires 5 years 2 each 0    tiotropium (SPIRIVA HANDIHALER) 18 MCG inhalation capsule Inhale 1 capsule into the lungs daily 90 capsule 5    albuterol (PROVENTIL) (2.5 MG/3ML) 0.083% nebulizer solution Take 3 mLs by nebulization every 6 hours as needed for Wheezing 120 each 3    albuterol sulfate HFA (PROAIR HFA) 108 (90 Base) MCG/ACT inhaler Inhale 2 puffs into the lungs every 6 hours as needed for Wheezing 1 Inhaler 3    levothyroxine (SYNTHROID) 125 MCG tablet TAKE 1 TABLET BY MOUTH ONCE DAILY       Current Facility-Administered Medications   Medication Dose Route Frequency Provider Last Rate Last Admin    albuterol (ACCUNEB) nebulizer solution 0.63 mg  0.63 mg Nebulization Once Kory Washington MD         Allergies   Allergen Reactions    Benactyzine     Benadryl [Diphenhydramine]     Ciprofloxacin     Codeine     Dilantin  [Phenytoin Sodium Extended]     Dye [Iodides]     Lorazepam     Prochlorperazine Edisylate     Sulfa Antibiotics     Sulfamethoxazole-Trimethoprim     Zofran [Ondansetron Hcl] Nausea And Vomiting       Health Maintenance   Topic Date Due    Shingles vaccine (1 of 2) 04/29/2023 (Originally 5/16/2001)    Annual Wellness Visit (AWV)  10/29/2022    Breast cancer screen  11/17/2022    Depression Screen  06/13/2023    Colorectal Cancer Screen  08/01/2023    DTaP/Tdap/Td vaccine (2 - Td or Tdap) 03/25/2027    Lipids  04/22/2027    DEXA (modify frequency per FRAX score)  Completed    Flu vaccine  Completed    Pneumococcal 65+ years Vaccine  Completed    COVID-19 Vaccine  Completed    Hepatitis C screen  Completed    Hepatitis A vaccine  Aged Out    Hepatitis B vaccine  Aged Out    Hib vaccine  Aged Out    Meningococcal (ACWY) vaccine  Aged Out       Subjective:      Review of Systems   Constitutional: Negative for activity change, fatigue and fever. HENT: Negative for congestion, rhinorrhea and sore throat. Eyes: Negative for visual disturbance. Respiratory: Negative for chest tightness and shortness of breath. Cardiovascular: Negative for chest pain and palpitations. Gastrointestinal: Negative for abdominal pain, diarrhea, nausea and vomiting. Endocrine: Negative for polydipsia. Genitourinary: Negative for difficulty urinating. Musculoskeletal: Positive for arthralgias (L shoulder). Negative for myalgias. Skin: Negative for color change. Neurological: Negative for weakness and headaches. Psychiatric/Behavioral: Negative for behavioral problems. The patient is not nervous/anxious. All other systems reviewed and are negative.       Objective:   BP 108/72   Pulse (!) 48   Wt 150 lb (68 kg)   SpO2 93%   BMI 28.34 kg/m²   Physical Exam  Vitals reviewed. Constitutional:       General: She is not in acute distress. Appearance: Normal appearance. HENT:      Head: Normocephalic. Eyes:      Pupils: Pupils are equal, round, and reactive to light. Cardiovascular:      Rate and Rhythm: Normal rate and regular rhythm. Pulses: Normal pulses. Heart sounds: Normal heart sounds. Pulmonary:      Effort: Pulmonary effort is normal.      Breath sounds: Normal breath sounds. Abdominal:      General: There is no distension. Musculoskeletal:      Right shoulder: Normal.      Left shoulder: Tenderness present. No bony tenderness. Decreased range of motion. Normal strength. Arms:       Cervical back: Neck supple. Right lower leg: No edema. Left lower leg: No edema. Lymphadenopathy:      Cervical: No cervical adenopathy. Skin:     General: Skin is warm and dry. Capillary Refill: Capillary refill takes less than 2 seconds. Neurological:      General: No focal deficit present. Mental Status: She is alert and oriented to person, place, and time. Psychiatric:         Mood and Affect: Mood normal.         Behavior: Behavior normal.           :       Diagnosis Orders   1. Decreased ROM of left shoulder  MRI SHOULDER LEFT WO CONTRAST   2. Neuropathy of left upper extremity  gabapentin (NEURONTIN) 100 MG capsule   3. Burning pain  gabapentin (NEURONTIN) 100 MG capsule    MRI SHOULDER LEFT WO CONTRAST   4. Pain in left upper arm  MRI SHOULDER LEFT WO CONTRAST   5. Chronic obstructive pulmonary disease, unspecified COPD type ([facility] [address]     6. Mild persistent asthma without complication               :          1. Decreased ROM of left shoulder  2. Neuropathy of left upper extremity  3. Burning pain  4. Pain in left upper arm  Worsening, advised ortho f/u. Will order MRI L shoulder to rule out tear or other acute issue.  Trial gabapentin 100mg TID ans f/u if no improvement. Encouraged home exercises and otc NSAIDs, RICE. - gabapentin (NEURONTIN) 100 MG capsule; Take 1 capsule by mouth 3 times daily for 30 days. Dispense: 90 capsule; Refill: 1  - MRI SHOULDER LEFT WO CONTRAST; Future    5. Asthma  6. COPD  Stable, continue spiriva and albuterol, follows with pulm     Return if symptoms worsen or fail to improve. Patient given educational materials - see patient instructions. Discussed use, benefit, and side effects of prescribed medications. All patient questions answered. Pt voiced understanding. Reviewed health maintenance. Instructed to continue current medications, diet and exercise. Patient agreed with treatment plan. Follow up as directed.        Electronicallysigned by SORAYA Mcwilliams CNP on 6/17/2022 at 9:47 AM

## 2022-06-13 NOTE — PATIENT INSTRUCTIONS
Patient Education        Shoulder Pain: Care Instructions  Your Care Instructions     You can hurt your shoulder by using it too much during an activity, such as fishing or baseball. It can also happen as part of the everyday wear and tear of getting older. Shoulder injuries can be slow to heal, but your shouldershould get better with time. Your doctor may recommend a sling to rest your shoulder. If you have injuredyour shoulder, you may need testing and treatment. Follow-up care is a key part of your treatment and safety. Be sure to make and go to all appointments, and call your doctor if you are having problems. It's also a good idea to know your test results and keep alist of the medicines you take. How can you care for yourself at home?  Take pain medicines exactly as directed. ? If the doctor gave you a prescription medicine for pain, take it as prescribed. ? If you are not taking a prescription pain medicine, ask your doctor if you can take an over-the-counter medicine. ? Do not take two or more pain medicines at the same time unless the doctor told you to. Many pain medicines contain acetaminophen, which is Tylenol. Too much acetaminophen (Tylenol) can be harmful.  If your doctor recommends that you wear a sling, use it as directed. Do not take it off before your doctor tells you to.  Put ice or a cold pack on the sore area for 10 to 20 minutes at a time. Put a thin cloth between the ice and your skin.  If there is no swelling, you can put moist heat, a heating pad, or a warm cloth on your shoulder. Some doctors suggest alternating between hot and cold.  Rest your shoulder for a few days. If your doctor recommends it, you can then begin gentle exercise of the shoulder, but do not lift anything heavy. When should you call for help? Call 911 anytime you think you may need emergency care. For example, call if:     You have chest pain or pressure. This may occur with:  ? Sweating. ?  Shortness of breath. ? Nausea or vomiting. ? Pain that spreads from the chest to the neck, jaw, or one or both shoulders or arms. ? Dizziness or lightheadedness. ? A fast or uneven pulse. After calling 911, chew 1 adult-strength aspirin. Wait for an ambulance. Do not try to drive yourself.      Your arm or hand is cool or pale or changes color. Call your doctor now or seek immediate medical care if:     You have signs of infection, such as:  ? Increased pain, swelling, warmth, or redness in your shoulder. ? Red streaks leading from a place on your shoulder. ? Pus draining from an area of your shoulder. ? Swollen lymph nodes in your neck, armpits, or groin. ? A fever. Watch closely for changes in your health, and be sure to contact your doctor if:     You cannot use your shoulder.      Your shoulder does not get better as expected. Where can you learn more? Go to https://Liligo.com.RxResults. org and sign in to your Outbox account. Enter R994 in the Reclog box to learn more about \"Shoulder Pain: Care Instructions. \"     If you do not have an account, please click on the \"Sign Up Now\" link. Current as of: July 1, 2021               Content Version: 13.2  © 2006-2022 Healthwise, Incorporated. Care instructions adapted under license by [facility] ([facility]). If you have questions about a medical condition or this instruction, always ask your healthcare professional. [place] [address] any warranty or liability for your use of this information.

## 2022-06-17 ASSESSMENT — ENCOUNTER SYMPTOMS
COLOR CHANGE: 0
DIARRHEA: 0
VOMITING: 0
SHORTNESS OF BREATH: 0
SORE THROAT: 0
ABDOMINAL PAIN: 0
NAUSEA: 0
RHINORRHEA: 0
CHEST TIGHTNESS: 0

## 2022-08-10 ENCOUNTER — OFFICE VISIT (OUTPATIENT)
Dept: FAMILY MEDICINE CLINIC | Age: 72
End: 2022-08-10
Payer: MEDICARE

## 2022-08-10 VITALS
DIASTOLIC BLOOD PRESSURE: 85 MMHG | HEART RATE: 103 BPM | TEMPERATURE: 97.8 F | BODY MASS INDEX: 27.75 KG/M2 | SYSTOLIC BLOOD PRESSURE: 131 MMHG | WEIGHT: 147 LBS | HEIGHT: 61 IN

## 2022-08-10 DIAGNOSIS — R22.0 SWELLING OF RIGHT SIDE OF FACE: ICD-10-CM

## 2022-08-10 DIAGNOSIS — K03.81 CRACKED TOOTH: ICD-10-CM

## 2022-08-10 DIAGNOSIS — K04.7 DENTAL INFECTION: Primary | ICD-10-CM

## 2022-08-10 PROCEDURE — 99214 OFFICE O/P EST MOD 30 MIN: CPT | Performed by: NURSE PRACTITIONER

## 2022-08-10 PROCEDURE — 1123F ACP DISCUSS/DSCN MKR DOCD: CPT | Performed by: NURSE PRACTITIONER

## 2022-08-10 RX ORDER — PENICILLIN V POTASSIUM 500 MG/1
500 TABLET ORAL 4 TIMES DAILY
Qty: 28 TABLET | Refills: 0 | Status: SHIPPED | OUTPATIENT
Start: 2022-08-10 | End: 2022-08-17

## 2022-08-10 ASSESSMENT — ENCOUNTER SYMPTOMS
NAUSEA: 0
COUGH: 0
TROUBLE SWALLOWING: 0
RHINORRHEA: 0
VOMITING: 0
FACIAL SWELLING: 1
EYE PAIN: 0

## 2022-08-10 NOTE — PROGRESS NOTES
[address] [facility]  [address] [facility] [address]  Dept: [phone]  Dept Fax: [phone]    Elvis Kirby is a 71 y.o. female who presents today for her medical conditions/complaints of   Chief Complaint   Patient presents with    Facial Pain     Her face is swelling on right side, she thinks she may have lost a filling in top teeth, she is looking for a dentist but concerned for infection          HPI:     /85   Pulse (!) 103   Temp 97.8 °F (36.6 °C) (Temporal)   Ht 5' 1\" (1.549 m)   Wt 147 lb (66.7 kg)   BMI 27.78 kg/m²       HPI    Patient presented to the urgent care today with c/o dental pain. This is a new problem. Pain is located right upper. The current episode started 4 days ago. The problem occurs constantly. The problem has been worsening. The pain is at a severity of 8/10. The pain is severe. Associated symptoms include facial pain, sensitivity to hot/cold liquids, facial swelling right face. Pertinent negatives include no difficulty swallowing, fever, chills, nausea, vomiting,oral bleeding, inability to open or close mouth. Treatments tried: Motrin. The treatment provided mild relief. Pt has cracked tooth and filling missing in top teeth. Patient has called her insurance company to help with finding dentist on her plan. Past Medical History:   Diagnosis Date    Asthma     Cancer of lung ([facility] [address] 1/27/2014    Diagnosed in 1995 Check annual chest x-ray    COPD (chronic obstructive pulmonary disease) (HCC)     ESBL (extended spectrum beta-lactamase) producing bacteria infection 07/24/2018    E.  Coli urine    High cholesterol 1/23/2017    Hypothyroid     Lung nodule 1/30/2018    CT scan ordered    Mild persistent asthma without complication 5/84/7013    Need for prophylactic vaccination and inoculation against influenza 10/21/2013    Osteopenia 3/28/2017    the patient's 10-year risk for major osteoporotic fracture is 11.9% and for hip fracture is 2.1%. S/P lobectomy of lung 2014    Seizures ([facility] [address]         Past Surgical History:   Procedure Laterality Date    BREAST CYST EXCISION Bilateral     fibiritic    CHOLECYSTECTOMY      LUNG REMOVAL, TOTAL Right     OVARIAN CYST REMOVAL Bilateral     TONSILLECTOMY      TUBAL LIGATION         No family history on file. Social History     Tobacco Use    Smoking status: Former     Packs/day: 0.50     Years: 23.00     Pack years: 11.50     Types: Cigarettes     Quit date: 10/7/1995     Years since quittin.8    Smokeless tobacco: Never   Substance Use Topics    Alcohol use: No     Alcohol/week: 0.0 standard drinks        Prior to Visit Medications    Medication Sig Taking? Authorizing Provider   penicillin v potassium (VEETID) 500 MG tablet Take 1 tablet by mouth in the morning and 1 tablet at noon and 1 tablet in the evening and 1 tablet before bedtime. Do all this for 7 days. Yes SORAYA Salmeron CNP   levothyroxine (SYNTHROID) 125 MCG tablet TAKE 1 TABLET BY MOUTH ONCE DAILY  Historical Provider, MD   gabapentin (NEURONTIN) 100 MG capsule Take 1 capsule by mouth 3 times daily for 30 days.   SORAYA Barajas CNP   ibuprofen (ADVIL;MOTRIN) 600 MG tablet Take 1 tablet by mouth 2 times daily as needed for Pain  SORAYA Montgomery CNP   vitamin D (ERGOCALCIFEROL) 1.25 MG (04748 UT) CAPS capsule Take 1 capsule by mouth once a week  SORAYA Montgomery CNP   loratadine (CLARITIN) 10 MG tablet Take 1 tablet by mouth daily  SORAYA Montgomery CNP   Handicap Placard MISC by Does not apply route Expires 5 years  SORAYA Barajas CNP   tiotropium (SPIRIVA HANDIHALER) 18 MCG inhalation capsule Inhale 1 capsule into the lungs daily  SORAYA Montgomery CNP   albuterol (PROVENTIL) (2.5 MG/3ML) 0.083% nebulizer solution Take 3 mLs by nebulization every 6 hours as needed for Wheezing  SORAYA Barajas CNP albuterol sulfate HFA (PROAIR HFA) 108 (90 Base) MCG/ACT inhaler Inhale 2 puffs into the lungs every 6 hours as needed for Wheezing  SORAYA Montgomery - CNP       Allergies   Allergen Reactions    Benactyzine     Benadryl [Diphenhydramine]     Ciprofloxacin     Codeine     Dilantin  [Phenytoin Sodium Extended]     Dye [Iodides]     Lorazepam     Prochlorperazine Edisylate     Sulfa Antibiotics     Sulfamethoxazole-Trimethoprim     Zofran [Ondansetron Hcl] Nausea And Vomiting         Subjective:      Review of Systems   Constitutional:  Negative for chills and fever. HENT:  Positive for dental problem and facial swelling. Negative for congestion, rhinorrhea and trouble swallowing. Eyes:  Negative for pain and visual disturbance. Respiratory:  Negative for cough. Cardiovascular:  Negative for chest pain. Gastrointestinal:  Negative for nausea and vomiting. Genitourinary:  Negative for decreased urine volume and difficulty urinating. Musculoskeletal:  Negative for gait problem and neck pain. Skin:  Negative for pallor. Neurological:  Negative for light-headedness and headaches. Psychiatric/Behavioral:  Positive for sleep disturbance (due to pain). Objective:     Physical Exam  Vitals and nursing note reviewed. Constitutional:       General: She is not in acute distress. Appearance: Normal appearance. HENT:      Head: Normocephalic and atraumatic. Jaw: No trismus. Comments: Facial swelling and tenderness     Right Ear: Ear canal normal.      Left Ear: Ear canal normal.      Nose: Nose normal.      Mouth/Throat:      Lips: Pink. Mouth: Mucous membranes are moist.      Dentition: Abnormal dentition. Dental tenderness present. Pharynx: Oropharynx is clear. Uvula midline. Comments: Cracked tooth. Dental tenderness with erythema. No obvious areas of fluctuance. Eyes:      Extraocular Movements: Extraocular movements intact.       Conjunctiva/sclera: Conjunctivae normal.   Cardiovascular:      Rate and Rhythm: Regular rhythm. Tachycardia present. Pulses: Normal pulses. Pulmonary:      Effort: Pulmonary effort is normal.      Breath sounds: Normal breath sounds. Abdominal:      General: Bowel sounds are normal.      Palpations: Abdomen is soft. Musculoskeletal:         General: Normal range of motion. Cervical back: Normal range of motion and neck supple. Lymphadenopathy:      Cervical: No cervical adenopathy. Skin:     General: Skin is warm and dry. Capillary Refill: Capillary refill takes less than 2 seconds. Coloration: Skin is not pale. Neurological:      Mental Status: She is alert and oriented to person, place, and time. Coordination: Coordination normal.      Gait: Gait normal.   Psychiatric:         Mood and Affect: Mood normal.         Thought Content: Thought content normal.         MEDICAL DECISION MAKING Assessment/Plan:     Hamilton King was seen today for facial pain. Diagnoses and all orders for this visit:    Dental infection  -     penicillin v potassium (VEETID) 500 MG tablet; Take 1 tablet by mouth in the morning and 1 tablet at noon and 1 tablet in the evening and 1 tablet before bedtime. Do all this for 7 days.  -     External Referral To Dentistry    Swelling of right side of face  -     penicillin v potassium (VEETID) 500 MG tablet; Take 1 tablet by mouth in the morning and 1 tablet at noon and 1 tablet in the evening and 1 tablet before bedtime. Do all this for 7 days.  -     External Referral To Dentistry    Cracked tooth  -     penicillin v potassium (VEETID) 500 MG tablet; Take 1 tablet by mouth in the morning and 1 tablet at noon and 1 tablet in the evening and 1 tablet before bedtime.  Do all this for 7 days.  -     External Referral To Dentistry      Results for orders placed or performed during the hospital encounter of 04/22/22   CBC with Auto Differential   Result Value Ref Range    WBC 7.2 3.5 - 11.3 k/uL    RBC 5.10 3.95 - 5.11 m/uL    Hemoglobin 14.0 11.9 - 15.1 g/dL    Hematocrit 45.8 36.3 - 47.1 %    MCV 89.8 82.6 - 102.9 fL    MCH 27.5 25.2 - 33.5 pg    MCHC 30.6 28.4 - 34.8 g/dL    RDW 13.0 11.8 - 14.4 %    Platelets See Reflexed IPF Result 138 - 453 k/uL    NRBC Automated 0.0 0.0 per 100 WBC    Seg Neutrophils 62 36 - 65 %    Lymphocytes 22 (L) 24 - 43 %    Monocytes 12 3 - 12 %    Eosinophils % 3 1 - 4 %    Basophils 1 0 - 2 %    Immature Granulocytes 1 (H) 0 %    Segs Absolute 4.42 1.50 - 8.10 k/uL    Absolute Lymph # 1.57 1.10 - 3.70 k/uL    Absolute Mono # 0.83 0.10 - 1.20 k/uL    Absolute Eos # 0.19 0.00 - 0.44 k/uL    Basophils Absolute 0.05 0.00 - 0.20 k/uL    Absolute Immature Granulocyte 0.10 0.00 - 0.30 k/uL   Comprehensive Metabolic Panel   Result Value Ref Range    Glucose 92 70 - 99 mg/dL    BUN 11 8 - 23 mg/dL    Creatinine 0.67 0.50 - 0.90 mg/dL    Calcium 9.9 8.6 - 10.4 mg/dL    Sodium 145 (H) 135 - 144 mmol/L    Potassium 4.4 3.7 - 5.3 mmol/L    Chloride 105 98 - 107 mmol/L    CO2 25 20 - 31 mmol/L    Anion Gap 15 9 - 17 mmol/L    Alkaline Phosphatase 112 (H) 35 - 104 U/L    ALT 11 5 - 33 U/L    AST 14 <32 U/L    Total Bilirubin 0.40 0.3 - 1.2 mg/dL    Total Protein 7.0 6.4 - 8.3 g/dL    Albumin 4.0 3.5 - 5.2 g/dL    Albumin/Globulin Ratio 1.3 1.0 - 2.5    GFR Non-African American >60 >60 mL/min    GFR African American >60 >60 mL/min    GFR Comment         Lipid Panel   Result Value Ref Range    Cholesterol 193 <200 mg/dL    HDL 53 >40 mg/dL    LDL Cholesterol 114 0 - 130 mg/dL    Chol/HDL Ratio 3.6 <5    Triglycerides 128 <150 mg/dL   TSH with Reflex   Result Value Ref Range    TSH 0.05 (L) 0.30 - 5.00 uIU/mL   Vitamin D 25 Hydroxy   Result Value Ref Range    Vit D, 25-Hydroxy 16.6 (L) >29.9 ng/mL   Immature Platelet Fraction   Result Value Ref Range    Platelet, Immature Fraction 5.7 1.1 - 10.3 %    Platelet, Fluorescence 258 138 - 453 k/uL   T4, Free   Result Value Ref Range Thyroxine, Free 1.99 (H) 0.93 - 1.70 ng/dL     Based on history and exam will treat as dental infection. Please fill, take and complete the antibiotics prescribed. You may take Ibuprofen or Tylenol for pain as directed on the bottle. You may purchase Orajel from the pharmacy and apply as directed on the label as needed for tooth and gum pain. Soft diet for comfort. Rinse your mouth with water after eating. Follow up with your dentist. Referral placed and pt waiting on call from insurance company for dentist that accepts her insurance. Go to the ER for concerning symptoms such as severe nausea, vomiting, fevers, chills, uncontrollable pain, difficulty opening your mouth or difficulty handling secretions, difficulty swallowing or turning your neck, chest pain, shortness of breath or for any other issue that you feel warrants immediate attention. Patient given educational materials - see patientinstructions. Discussed use, benefit, and side effects of prescribed medications. All patient questions answered. Pt verbalized understanding. Instructed to continue current medications, diet and exercise. Patient agreed with treatment plan. Follow up as directed.      Electronically signed by SORAYA Alonzo CNP on 8/10/2022 at 5:50 PM

## 2022-09-06 DIAGNOSIS — M25.512 CHRONIC LEFT SHOULDER PAIN: ICD-10-CM

## 2022-09-06 DIAGNOSIS — G89.29 CHRONIC LEFT SHOULDER PAIN: ICD-10-CM

## 2022-09-06 RX ORDER — MELOXICAM 7.5 MG/1
TABLET ORAL
Qty: 90 TABLET | Refills: 1 | Status: SHIPPED | OUTPATIENT
Start: 2022-09-06

## 2022-09-20 ENCOUNTER — OFFICE VISIT (OUTPATIENT)
Dept: PULMONOLOGY | Age: 72
End: 2022-09-20
Payer: MEDICARE

## 2022-09-20 VITALS
HEART RATE: 82 BPM | TEMPERATURE: 97.7 F | WEIGHT: 154 LBS | DIASTOLIC BLOOD PRESSURE: 73 MMHG | SYSTOLIC BLOOD PRESSURE: 134 MMHG | HEIGHT: 62 IN | BODY MASS INDEX: 28.34 KG/M2 | RESPIRATION RATE: 16 BRPM | OXYGEN SATURATION: 96 %

## 2022-09-20 DIAGNOSIS — Z90.2 H/O PNEUMONECTOMY: ICD-10-CM

## 2022-09-20 DIAGNOSIS — C34.91 MALIGNANT NEOPLASM OF RIGHT LUNG, UNSPECIFIED PART OF LUNG (HCC): ICD-10-CM

## 2022-09-20 DIAGNOSIS — Z98.890 H/O PNEUMONECTOMY: ICD-10-CM

## 2022-09-20 DIAGNOSIS — J44.9 CHRONIC OBSTRUCTIVE PULMONARY DISEASE, UNSPECIFIED COPD TYPE (HCC): Primary | ICD-10-CM

## 2022-09-20 DIAGNOSIS — J98.4 RESTRICTIVE LUNG DISEASE: ICD-10-CM

## 2022-09-20 PROCEDURE — 99214 OFFICE O/P EST MOD 30 MIN: CPT | Performed by: INTERNAL MEDICINE

## 2022-09-20 PROCEDURE — 1123F ACP DISCUSS/DSCN MKR DOCD: CPT | Performed by: INTERNAL MEDICINE

## 2022-09-20 RX ORDER — TIOTROPIUM BROMIDE 18 UG/1
18 CAPSULE ORAL; RESPIRATORY (INHALATION) DAILY
Qty: 90 CAPSULE | Refills: 5 | Status: SHIPPED | OUTPATIENT
Start: 2022-09-20

## 2022-09-20 ASSESSMENT — ENCOUNTER SYMPTOMS
SHORTNESS OF BREATH: 1
COUGH: 0
WHEEZING: 0

## 2022-09-20 NOTE — PROGRESS NOTES
REASON FOR THE CONSULTATION:  COPD, right pneumonectomy for lung cancer  HISTORY OF PRESENT ILLNESS:    Velia Ortiz is a 71y.o. year old female   Patient is doing well shortness of breath is stable with exertion she did not have any hospital visits for COPD exacerbation or need steroids. Sputum is nonpurulent no hemoptysis she is compliant with albuterol    Did not get chest xray   Not using spiriva due to cost   Last visit   Did not go for pulmonary rehab - did not receive call . Sob with exertion stable   No hemoptysis   Cannot afford inhalers other than albuterol  Last visit    was last seen in pulmonary clinic in 2017 and she did not follow-up after that since she retired patient has noticed that she has been having progressive shortness of breath over the last few years. She uses Spiriva but cannot afford other inhalers because of expense and she also since the Spiriva prescription to [facility]). She only uses albuterol once a day. CT of the chest in 2018 did not show residual malignancy or evidence of malignancy. PFT showed restrictive pattern. She does not have cough with hemoptysis or purulent sputum. She does not complain of active wheezing      Last visit        She had history of lung cancer in 1995 and a right pneumonectomy post chemo and radiation. She quit smoking in 1995. She presently works as a nurse's aide and is active all day long can walk all day only sob when goes upstairs or incline . Uses inhaler ( albuterol ) based on weather , but cannot afford spiriva . She does have a cough with clear sputum. No fever, chills, rigors, no hemoptysis, no weight loss    Had lung cancer 1995 right lung  cancer . right pneumonectomy , chemo , xrt    she had right  pneumonectomy in [place] [address] , Dr Lucila Rasheed smoking 1995 .   PAST MEDICAL HISTORY:       Diagnosis Date    Asthma     Cancer of lung ([facility] [address] 1/27/2014    Diagnosed in 1995 Check annual chest x-ray    COPD (chronic obstructive pulmonary disease) (HCC)     ESBL (extended spectrum beta-lactamase) producing bacteria infection 07/24/2018    E. Coli urine    High cholesterol 1/23/2017    Hypothyroid     Lung nodule 1/30/2018    CT scan ordered    Mild persistent asthma without complication 6/25/0969    Need for prophylactic vaccination and inoculation against influenza 10/21/2013    Osteopenia 3/28/2017    the patient's 10-year risk for major osteoporotic fracture is 11.9% and for hip fracture is 2.1%. S/P lobectomy of lung 1/27/2014    Seizures ([address]          Family History:   No family history on file. SURGICAL HISTORY:   Past Surgical History:   Procedure Laterality Date    BREAST CYST EXCISION Bilateral     fibiritic    CHOLECYSTECTOMY      LUNG REMOVAL, TOTAL Right     OVARIAN CYST REMOVAL Bilateral     TONSILLECTOMY      TUBAL LIGATION             SOCIAL AND OCCUPATIONAL HEALTH:      There  no history of TB or TB exposure. There  no asbestos or silica dust exposure. The patient reports  no coal, foundry, quarry or [employer] exposure. Travel history reveals no. There  no history of recreational or IV drug use. There  no hot tube exposure. Pets  no    Occupational history nurses aid     TOBACCO:   reports that she quit smoking about 26 years ago. Her smoking use included cigarettes. She has a 11.50 pack-year smoking history. She has never used smokeless tobacco.  ETOH:   reports no history of alcohol use.   Immunization History   Administered Date(s) Administered    COVID-19, MODERNA BLUE border, Primary or Immunocompromised, (age 12y+), IM, 100 mcg/0.5mL 02/26/2021, 03/26/2021, 11/26/2021    Influenza A ([id number]) Vaccine PF IM 11/24/2009    Influenza Vaccine, unspecified formulation 10/05/2017    Influenza Virus Vaccine 10/05/2017    Influenza Whole 10/30/2015    Influenza, FLUAD, (age 72 y+), Adjuvanted, 0.5mL 09/24/2020, 10/28/2021    Influenza, High Dose (Fluzone 65 yrs and older) 10/16/2018 Influenza, Intradermal, Preservative free 10/21/2013    Influenza, Triv, inactivated, subunit, adjuvanted, IM (Fluad 65 yrs and older) 10/19/2019    Pneumococcal Conjugate 13-valent ([id number]) 03/14/2017    Pneumococcal Polysaccharide ([id number]) 07/27/2020    Tdap (Boostrix, Adacel) 03/25/2017        ALLERGIES:      Allergies   Allergen Reactions    Benactyzine     Benadryl [Diphenhydramine]     Ciprofloxacin     Codeine     Dilantin  [Phenytoin Sodium Extended]     Dye [Iodides]     Lorazepam     Prochlorperazine Edisylate     Sulfa Antibiotics     Sulfamethoxazole-Trimethoprim     Zofran [Ondansetron Hcl] Nausea And Vomiting         Home Meds:   Prior to Admission medications    Medication Sig Start Date End Date Taking?  Authorizing Provider   tiotropium (SPIRIVA HANDIHALER) 18 MCG inhalation capsule Inhale 1 capsule into the lungs daily 9/20/22  Yes Steffany Robbins MD   meloxicam (MOBIC) 7.5 MG tablet Take 1 tablet by mouth once daily 9/6/22  Yes SORAYA Montgomery CNP   levothyroxine (SYNTHROID) 125 MCG tablet TAKE 1 TABLET BY MOUTH ONCE DAILY 5/6/22  Yes Historical Provider, MD   ibuprofen (ADVIL;MOTRIN) 600 MG tablet Take 1 tablet by mouth 2 times daily as needed for Pain 4/29/22  Yes Lodema Render, APRN - CNP   vitamin D (ERGOCALCIFEROL) 1.25 MG (88497 UT) CAPS capsule Take 1 capsule by mouth once a week 4/26/22  Yes Lodema Render, APRN - CNP   loratadine (CLARITIN) 10 MG tablet Take 1 tablet by mouth daily 4/13/22  Yes SORAYA Montgomery CNP   Handicap Placard MISC by Does not apply route Expires 5 years 4/13/22  Yes SORAYA Montgomery CNP   albuterol (PROVENTIL) (2.5 MG/3ML) 0.083% nebulizer solution Take 3 mLs by nebulization every 6 hours as needed for Wheezing 8/16/21  Yes SORAYA Montgomery CNP   albuterol sulfate HFA (PROAIR HFA) 108 (90 Base) MCG/ACT inhaler Inhale 2 puffs into the lungs every 6 hours as needed for Wheezing 7/27/20  Yes Lodema Render, APRN - CNP   gabapentin (NEURONTIN) 100 MG capsule Take 1 capsule by mouth 3 times daily for 30 days. 6/13/22 7/13/22  SORAYA Palumbo - JENN             REVIEW OF SYSTEMS:  Review of Systems   Constitutional: Negative. HENT: Negative. Respiratory:  Positive for shortness of breath. Negative for cough and wheezing. Cardiovascular: Negative. Vitals:  /73 (Site: Right Upper Arm)   Pulse 82   Temp 97.7 °F (36.5 °C)   Resp 16   Ht 5' 2\" (1.575 m)   Wt 154 lb (69.9 kg)   SpO2 96% Comment: room air at rest  BMI 28.17 kg/m²     PHYSICAL EXAM:  Head and neck atraumatic, normocephalic    Lymph nodes-no cervical, supraclavicular lymphadenopathy    Neck-no JVP elevation    Lungs - AP diameter of chest increased. Thoracic expansion and diaphragmatic excursion diminished. BS diminished and expiratory phase prolonged. No dullness to percussion or tenderness to palpation. No bronchial breath sounds . CVS- S1, S2 regular. No S3 no S4, no murmurs    Abdomen-nontender, nondistended. Bowel sounds are present. No organomegaly    Lower extremity-no edema    Upper extremity-no edema    Neurological-grossly normal cranial nerves. No overt motor deficit          IMPRESSION:     1. Status post right pneumonectomy 2. Secondary Hyperexpansion left lung. 3. Right pleural calcification or other mineral such as talc. 4. No evidence of recurrent or metastatic malignancy. Report Electronically signed by Amanda Padilla M.D. on 4/11/2014 2:40 PM Transcribed by: [facility] on Apr 11 2014  2:42P Read by:  Tito Barron M.D.  [id number] on Apr 11 2014  2:42P Electronically Signed by:  DR. Tito Barron M.D. on:  Apr 11 2014   2:42P   Acuity: Acute   Type of Exam: Initial       FINDINGS:   Stable findings compatible with right pneumonectomy with volume loss on the   right and rightward mediastinal shift and complete opacification of right   hemithorax.   Left lung remains clear without focal consolidation, pleural effusion or pulmonary edema. No pneumothoraces. Limited evaluation of   cardiac and mediastinal silhouettes secondary to right hemithorax findings   but cardiac and mediastinal silhouettes appear grossly stable. No acute bony   abnormalities are seen. Impression   Stable exam without evidence for acute cardiopulmonary process. IMPRESSION:     Diagnosis Orders   1. Chronic obstructive pulmonary disease, unspecified COPD type ([address]  tiotropium (SPIRIVA HANDIHALER) 18 MCG inhalation capsule      2. Malignant neoplasm of right lung, unspecified part of lung ([address]        3. H/O pneumonectomy right         4. Restrictive lung disease             :                PLAN:      Advised patient to get chest x-ray. Continue albuterol as needed. She wants prescription for Spiriva because will have new insurance and prescription will be cheaper. Follow-up in 6 months    I hope this updates you on my evaluation and clinical thinking. Thank you for allowing me to participate in his care. Sincerely,      Electronically signed by Angelika Davila MD on 9/20/2022 at 10:34 AM       Please note that this chart was generated using voice recognition Dragon dictation software. Although every effort was made to ensure the accuracy of this automated transcription, some errors in transcription may have occurred.

## 2022-10-25 ENCOUNTER — TELEPHONE (OUTPATIENT)
Dept: PRIMARY CARE CLINIC | Age: 72
End: 2022-10-25

## 2022-10-25 DIAGNOSIS — J44.1 COPD WITH ACUTE EXACERBATION (HCC): ICD-10-CM

## 2022-10-25 DIAGNOSIS — J44.9 CHRONIC OBSTRUCTIVE PULMONARY DISEASE, UNSPECIFIED COPD TYPE (HCC): Primary | ICD-10-CM

## 2022-10-25 DIAGNOSIS — Z90.2 S/P LOBECTOMY OF LUNG: ICD-10-CM

## 2022-10-25 RX ORDER — BENZOYL PEROXIDE
KIT TOPICAL
Qty: 30 TABLET | Refills: 2 | Status: SHIPPED | OUTPATIENT
Start: 2022-10-25

## 2022-10-25 NOTE — TELEPHONE ENCOUNTER
Pt informed, Shruthi Hodges will bring th placard over from [facility] office tomorrow

## 2022-10-25 NOTE — TELEPHONE ENCOUNTER
Pt asked if she could get a new handicap placard made? The one she has was made on 2022 but she never took it in so it  and the [facility] wont take it since its .  Please advice

## 2023-03-14 ENCOUNTER — TELEPHONE (OUTPATIENT)
Dept: PRIMARY CARE CLINIC | Age: 73
End: 2023-03-14

## 2023-03-14 NOTE — TELEPHONE ENCOUNTER
Writer called patient to schedule a routine appointment as it appears she is due per [facility] G[facility] report.  Patient states she was driving and will contact office tomorrow to schedule an appointment after looking at her calendar

## 2023-03-22 RX ORDER — LEVOTHYROXINE SODIUM 0.12 MG/1
TABLET ORAL
Qty: 30 TABLET | Refills: 1 | Status: SHIPPED | OUTPATIENT
Start: 2023-03-22

## 2023-04-04 ENCOUNTER — OFFICE VISIT (OUTPATIENT)
Dept: PULMONOLOGY | Age: 73
End: 2023-04-04
Payer: MEDICARE

## 2023-04-04 VITALS
SYSTOLIC BLOOD PRESSURE: 134 MMHG | TEMPERATURE: 97.3 F | OXYGEN SATURATION: 98 % | BODY MASS INDEX: 27.6 KG/M2 | DIASTOLIC BLOOD PRESSURE: 79 MMHG | WEIGHT: 150 LBS | HEIGHT: 62 IN | RESPIRATION RATE: 14 BRPM | HEART RATE: 97 BPM

## 2023-04-04 DIAGNOSIS — C34.91 MALIGNANT NEOPLASM OF RIGHT LUNG, UNSPECIFIED PART OF LUNG (HCC): ICD-10-CM

## 2023-04-04 DIAGNOSIS — J98.4 RESTRICTIVE LUNG DISEASE: ICD-10-CM

## 2023-04-04 DIAGNOSIS — J44.9 CHRONIC OBSTRUCTIVE PULMONARY DISEASE, UNSPECIFIED COPD TYPE (HCC): Primary | ICD-10-CM

## 2023-04-04 DIAGNOSIS — Z90.2 H/O PNEUMONECTOMY: ICD-10-CM

## 2023-04-04 DIAGNOSIS — Z98.890 H/O PNEUMONECTOMY: ICD-10-CM

## 2023-04-04 PROCEDURE — 1123F ACP DISCUSS/DSCN MKR DOCD: CPT | Performed by: INTERNAL MEDICINE

## 2023-04-04 PROCEDURE — 99214 OFFICE O/P EST MOD 30 MIN: CPT | Performed by: INTERNAL MEDICINE

## 2023-04-04 RX ORDER — ALBUTEROL SULFATE 90 UG/1
2 AEROSOL, METERED RESPIRATORY (INHALATION) EVERY 6 HOURS PRN
Qty: 1 EACH | Refills: 11 | Status: SHIPPED | OUTPATIENT
Start: 2023-04-04

## 2023-04-04 ASSESSMENT — ENCOUNTER SYMPTOMS
WHEEZING: 0
COUGH: 0
SHORTNESS OF BREATH: 1

## 2023-04-04 NOTE — PATIENT INSTRUCTIONS
Will send DME order to [facility]- ph: [phone]  4/5/23mm: DME order was faxed to [facility]- (f) [phone]

## 2023-04-04 NOTE — PROGRESS NOTES
daily as needed for Pain 4/29/22  Yes SORAYA Craig CNP   vitamin D (ERGOCALCIFEROL) 1.25 MG (30533 UT) CAPS capsule Take 1 capsule by mouth once a week 4/26/22  Yes SORAYA Montgomery CNP   albuterol (PROVENTIL) (2.5 MG/3ML) 0.083% nebulizer solution Take 3 mLs by nebulization every 6 hours as needed for Wheezing 8/16/21  Yes SORAYA Montgomery CNP   gabapentin (NEURONTIN) 100 MG capsule Take 1 capsule by mouth 3 times daily for 30 days. 6/13/22 7/13/22  SORAYA Craig CNP             REVIEW OF SYSTEMS:  Review of Systems   Constitutional: Negative. HENT: Negative. Respiratory:  Positive for shortness of breath. Negative for cough and wheezing. Cardiovascular: Negative. Vitals:  /79 (Site: Right Lower Arm, Position: Sitting, Cuff Size: Large Adult)   Pulse 97   Temp 97.3 °F (36.3 °C) (Temporal)   Resp 14   Ht 5' 2\" (1.575 m)   Wt 150 lb (68 kg)   SpO2 98%   BMI 27.44 kg/m²     PHYSICAL EXAM:  Head and neck atraumatic, normocephalic    Lymph nodes-no cervical, supraclavicular lymphadenopathy    Neck-no JVP elevation    Lungs - AP diameter of chest increased. Thoracic expansion and diaphragmatic excursion diminished. BS diminished and expiratory phase prolonged. No dullness to percussion or tenderness to palpation. No bronchial breath sounds . CVS- S1, S2 regular. No S3 no S4, no murmurs    Abdomen-nontender, nondistended. Bowel sounds are present. No organomegaly    Lower extremity-no edema    Upper extremity-no edema    Neurological-grossly normal cranial nerves. No overt motor deficit          IMPRESSION:     1. Status post right pneumonectomy 2. Secondary Hyperexpansion left lung. 3. Right pleural calcification or other mineral such as talc. 4. No evidence of recurrent or metastatic malignancy.    Report Electronically signed by Kaz Neff M.D. on 4/11/2014 2:40 PM Transcribed by: [facility] on Apr 11 2014  2:42P Read by:  Jason Singer M.D.

## 2023-06-23 ENCOUNTER — HOSPITAL ENCOUNTER (OUTPATIENT)
Age: 73
Setting detail: SPECIMEN
Discharge: HOME | End: 2023-06-23

## 2023-06-23 DIAGNOSIS — E03.9 HYPOTHYROIDISM, UNSPECIFIED TYPE: ICD-10-CM

## 2023-06-23 LAB
T4 FREE SERPL-MCNC: 1.5 NG/DL (ref 0.9–1.7)
TSH SERPL DL<=0.05 MIU/L-ACNC: 0.9 UIU/ML (ref 0.3–5)

## 2023-07-28 ENCOUNTER — OFFICE VISIT (OUTPATIENT)
Dept: PRIMARY CARE CLINIC | Age: 73
End: 2023-07-28

## 2023-07-28 VITALS
RESPIRATION RATE: 14 BRPM | WEIGHT: 154.2 LBS | OXYGEN SATURATION: 98 % | DIASTOLIC BLOOD PRESSURE: 84 MMHG | HEIGHT: 62 IN | BODY MASS INDEX: 28.37 KG/M2 | SYSTOLIC BLOOD PRESSURE: 136 MMHG | HEART RATE: 97 BPM

## 2023-07-28 DIAGNOSIS — G89.29 CHRONIC LEFT SHOULDER PAIN: ICD-10-CM

## 2023-07-28 DIAGNOSIS — Z12.31 ENCOUNTER FOR SCREENING MAMMOGRAM FOR BREAST CANCER: ICD-10-CM

## 2023-07-28 DIAGNOSIS — Z00.00 MEDICARE ANNUAL WELLNESS VISIT, SUBSEQUENT: Primary | ICD-10-CM

## 2023-07-28 DIAGNOSIS — G89.29 CHRONIC PAIN OF RIGHT KNEE: ICD-10-CM

## 2023-07-28 DIAGNOSIS — M25.512 CHRONIC LEFT SHOULDER PAIN: ICD-10-CM

## 2023-07-28 DIAGNOSIS — M25.561 CHRONIC PAIN OF RIGHT KNEE: ICD-10-CM

## 2023-07-28 DIAGNOSIS — J30.2 SEASONAL ALLERGIC RHINITIS, UNSPECIFIED TRIGGER: ICD-10-CM

## 2023-07-28 RX ORDER — MELOXICAM 7.5 MG/1
7.5 TABLET ORAL DAILY
Qty: 90 TABLET | Refills: 1 | Status: SHIPPED | OUTPATIENT
Start: 2023-07-28

## 2023-07-28 RX ORDER — FLUTICASONE PROPIONATE 50 MCG
2 SPRAY, SUSPENSION (ML) NASAL DAILY
Qty: 16 G | Refills: 2 | Status: SHIPPED | OUTPATIENT
Start: 2023-07-28

## 2023-07-28 ASSESSMENT — PATIENT HEALTH QUESTIONNAIRE - PHQ9
SUM OF ALL RESPONSES TO PHQ QUESTIONS 1-9: 0
SUM OF ALL RESPONSES TO PHQ9 QUESTIONS 1 & 2: 0
1. LITTLE INTEREST OR PLEASURE IN DOING THINGS: 0
2. FEELING DOWN, DEPRESSED OR HOPELESS: 0

## 2023-07-28 ASSESSMENT — LIFESTYLE VARIABLES
HOW MANY STANDARD DRINKS CONTAINING ALCOHOL DO YOU HAVE ON A TYPICAL DAY: 1 OR 2
HOW OFTEN DO YOU HAVE A DRINK CONTAINING ALCOHOL: MONTHLY OR LESS

## 2023-07-28 NOTE — PROGRESS NOTES
Medicare Annual Wellness Visit    Kristen Gage is here for Medicare AWV    Assessment & Plan   Medicare annual wellness visit, subsequent  Chronic left shoulder pain  Chronic pain of right knee  Waxing and waning, resume mobic   -     meloxicam (MOBIC) 7.5 MG tablet; Take 1 tablet by mouth daily, Disp-90 tablet, R-1Normal    Encounter for screening mammogram for breast cancer  -     ADRIEN Digital Screen Bilateral; Future    Seasonal allergic rhinitis, unspecified trigger  Waxing and waning, add flonase to claritin     -     fluticasone (FLONASE) 50 MCG/ACT nasal spray; 2 sprays by Each Nostril route daily, Disp-16 g, R-2Normal    Recommendations for Preventive Services Due: see orders and patient instructions/AVS.  Recommended screening schedule for the next 5-10 years is provided to the patient in written form: see Patient Instructions/AVS.     Return in about 3 weeks (around 8/18/2023) for as scheduled, will switch to routine f/.u . Subjective   The following acute and/or chronic problems were also addressed today:  OA- bilateral knees, will resume mobic. Left shoulder pain improved     Patient's complete Health Risk Assessment and screening values have been reviewed and are found in Flowsheets. The following problems were reviewed today and where indicated follow up appointments were made and/or referrals ordered. Positive Risk Factor Screenings with Interventions:               General HRA Questions:  Select all that apply: (!) New or Increased Pain    Pain Interventions:  Medication adjusted: resume mobic daily           Vision Screen:  Do you have difficulty driving, watching TV, or doing any of your daily activities because of your eyesight?: No  Have you had an eye exam within the past year?: (!) No  No results found.     Interventions:   Patient encouraged to make appointment with their eye specialist Plans for cataract removal       Advanced Directives:  Do you have a Living Will?: (!)

## 2023-07-28 NOTE — PATIENT INSTRUCTIONS
Learning About Vision Tests  What are vision tests? The four most common vision tests are visual acuity tests, refraction, visual field tests, and color vision tests. Visual acuity (sharpness) tests  These tests are used: To see if you need glasses or contact lenses. To monitor an eye problem. To check an eye injury. Visual acuity tests are done as part of routine exams. You may also have this test when you get your 's license or apply for some types of jobs. Visual field tests  These tests are used: To check for vision loss in any area of your range of vision. To screen for certain eye diseases. To look for nerve damage after a stroke, head injury, or other problem that could reduce blood flow to the brain. Refraction and color tests  A refraction test is done to find the right prescription for glasses and contact lenses. A color vision test is done to check for color blindness. Color vision is often tested as part of a routine exam. You may also have this test when you apply for a job where recognizing different colors is important, such as , electronics, or the [employer]. How are vision tests done? Visual acuity test   You cover one eye at a time. You read aloud from a wall chart across the room. You read aloud from a small card that you hold in your hand. Refraction   You look into a special device. The device puts lenses of different strengths in front of each eye to see how strong your glasses or contact lenses need to be. Visual field tests   Your doctor may have you look through special machines. Or your doctor may simply have you stare straight ahead while they move a finger into and out of your field of vision. Color vision test   You look at pieces of printed test patterns in various colors. You say what number or symbol you see. Your doctor may have you trace the number or symbol using a pointer. How do these tests feel?   There is very little chance of

## 2023-10-29 DIAGNOSIS — E03.9 HYPOTHYROIDISM, UNSPECIFIED TYPE: ICD-10-CM

## 2023-10-30 RX ORDER — LEVOTHYROXINE SODIUM 0.1 MG/1
100 TABLET ORAL DAILY
Qty: 30 TABLET | Refills: 2 | Status: SHIPPED | OUTPATIENT
Start: 2023-10-30

## 2023-11-30 DIAGNOSIS — J44.9 CHRONIC OBSTRUCTIVE PULMONARY DISEASE, UNSPECIFIED COPD TYPE (HCC): ICD-10-CM

## 2023-11-30 RX ORDER — LORATADINE 10 MG/1
10 TABLET ORAL DAILY
Qty: 90 TABLET | Refills: 0 | Status: SHIPPED | OUTPATIENT
Start: 2023-11-30

## 2023-12-26 ENCOUNTER — OFFICE VISIT (OUTPATIENT)
Dept: FAMILY MEDICINE CLINIC | Age: 73
End: 2023-12-26
Payer: MEDICARE

## 2023-12-26 ENCOUNTER — TELEPHONE (OUTPATIENT)
Dept: PRIMARY CARE CLINIC | Age: 73
End: 2023-12-26

## 2023-12-26 VITALS
OXYGEN SATURATION: 97 % | SYSTOLIC BLOOD PRESSURE: 126 MMHG | WEIGHT: 152.8 LBS | HEART RATE: 96 BPM | RESPIRATION RATE: 14 BRPM | TEMPERATURE: 97.4 F | HEIGHT: 62 IN | DIASTOLIC BLOOD PRESSURE: 84 MMHG | BODY MASS INDEX: 28.12 KG/M2

## 2023-12-26 DIAGNOSIS — K08.89 TOOTH PAIN: Primary | ICD-10-CM

## 2023-12-26 PROCEDURE — 99213 OFFICE O/P EST LOW 20 MIN: CPT | Performed by: NURSE PRACTITIONER

## 2023-12-26 PROCEDURE — 1123F ACP DISCUSS/DSCN MKR DOCD: CPT | Performed by: NURSE PRACTITIONER

## 2023-12-26 RX ORDER — AMOXICILLIN AND CLAVULANATE POTASSIUM 875; 125 MG/1; MG/1
1 TABLET, FILM COATED ORAL 2 TIMES DAILY
Qty: 20 TABLET | Refills: 0 | Status: SHIPPED | OUTPATIENT
Start: 2023-12-26 | End: 2024-01-05

## 2023-12-26 RX ORDER — IBUPROFEN 800 MG/1
800 TABLET ORAL EVERY 8 HOURS PRN
COMMUNITY
Start: 2023-12-07

## 2023-12-26 RX ORDER — ACETAMINOPHEN AND CODEINE PHOSPHATE 300; 30 MG/1; MG/1
1 TABLET ORAL EVERY 4 HOURS PRN
COMMUNITY
Start: 2023-12-07

## 2023-12-26 NOTE — TELEPHONE ENCOUNTER
Patient called into office stating she would like an appointment with PCP tomorrow. Patient states she had two molars taken out at the dentist on Thursday. Patient stated her face began to swell up on Friday and the antibiotic prescribed is not helping with the swelling. Writer informed patient of soonest appointment and informed patient of walk-in clinic and hours. Patient stated she is going to come to the walk-in clinic tomorrow morning to be seen.

## 2023-12-28 NOTE — PROGRESS NOTES
today for dental pain. Diagnoses and all orders for this visit:    Tooth pain    Other orders  -     amoxicillin-clavulanate (AUGMENTIN) 875-125 MG per tablet; Take 1 tablet by mouth 2 times daily for 10 days        Results for orders placed or performed during the hospital encounter of 06/23/23   T4, Free   Result Value Ref Range    Thyroxine, Free 1.5 0.9 - 1.7 ng/dL   TSH   Result Value Ref Range    TSH 0.90 0.30 - 5.00 uIU/mL       Patient counseled: Begin Augmentin twice daily for ten days. Keep follow up with Dentist as scheduled for tomorrow. If you developed symptoms that are red flags such as uncontrolled fevers or worsening pain, present to the ER for further evaluation     Patient given educational materials, see patient instructions. Discussed use, benefit, and side effects of prescribed medications. All patient questions answered. Pt verbalized understanding. Instructed to continue current medications, diet and exercise. Patient agreed with treatment plan. Follow up as directed.      Electronically signed by SORAYA Scott CNP on 12/27/2023 at 11:54 PM

## 2024-01-21 DIAGNOSIS — M25.512 CHRONIC LEFT SHOULDER PAIN: ICD-10-CM

## 2024-01-21 DIAGNOSIS — E03.9 HYPOTHYROIDISM, UNSPECIFIED TYPE: ICD-10-CM

## 2024-01-21 DIAGNOSIS — M25.561 CHRONIC PAIN OF RIGHT KNEE: ICD-10-CM

## 2024-01-21 DIAGNOSIS — G89.29 CHRONIC PAIN OF RIGHT KNEE: ICD-10-CM

## 2024-01-21 DIAGNOSIS — G89.29 CHRONIC LEFT SHOULDER PAIN: ICD-10-CM

## 2024-01-22 RX ORDER — MELOXICAM 7.5 MG/1
7.5 TABLET ORAL DAILY
Qty: 90 TABLET | Refills: 0 | Status: SHIPPED | OUTPATIENT
Start: 2024-01-22

## 2024-01-22 RX ORDER — LEVOTHYROXINE SODIUM 0.1 MG/1
100 TABLET ORAL DAILY
Qty: 30 TABLET | Refills: 1 | Status: SHIPPED | OUTPATIENT
Start: 2024-01-22

## 2024-02-25 DIAGNOSIS — J44.9 CHRONIC OBSTRUCTIVE PULMONARY DISEASE, UNSPECIFIED COPD TYPE (HCC): ICD-10-CM

## 2024-02-26 RX ORDER — LORATADINE 10 MG/1
10 TABLET ORAL DAILY
Qty: 90 TABLET | Refills: 1 | Status: SHIPPED | OUTPATIENT
Start: 2024-02-26

## 2024-03-18 ENCOUNTER — HOSPITAL ENCOUNTER (OUTPATIENT)
Dept: MAMMOGRAPHY | Age: 74
Discharge: HOME | End: 2024-03-20
Payer: MEDICARE

## 2024-03-18 DIAGNOSIS — Z12.31 ENCOUNTER FOR SCREENING MAMMOGRAM FOR BREAST CANCER: ICD-10-CM

## 2024-03-18 PROCEDURE — 77063 BREAST TOMOSYNTHESIS BI: CPT

## 2024-03-22 DIAGNOSIS — E03.9 HYPOTHYROIDISM, UNSPECIFIED TYPE: ICD-10-CM

## 2024-03-22 RX ORDER — LEVOTHYROXINE SODIUM 0.1 MG/1
100 TABLET ORAL DAILY
Qty: 30 TABLET | Refills: 2 | Status: SHIPPED | OUTPATIENT
Start: 2024-03-22

## 2024-03-25 ENCOUNTER — OFFICE VISIT (OUTPATIENT)
Dept: PRIMARY CARE CLINIC | Age: 74
End: 2024-03-25
Payer: MEDICARE

## 2024-03-25 VITALS
RESPIRATION RATE: 16 BRPM | BODY MASS INDEX: 27.11 KG/M2 | DIASTOLIC BLOOD PRESSURE: 64 MMHG | OXYGEN SATURATION: 99 % | SYSTOLIC BLOOD PRESSURE: 122 MMHG | HEART RATE: 101 BPM | WEIGHT: 148.2 LBS

## 2024-03-25 DIAGNOSIS — E03.9 HYPOTHYROIDISM, UNSPECIFIED TYPE: Primary | ICD-10-CM

## 2024-03-25 DIAGNOSIS — J44.9 CHRONIC OBSTRUCTIVE PULMONARY DISEASE, UNSPECIFIED COPD TYPE (HCC): ICD-10-CM

## 2024-03-25 DIAGNOSIS — Z13.6 SCREENING FOR CARDIOVASCULAR CONDITION: ICD-10-CM

## 2024-03-25 DIAGNOSIS — Z13.1 SCREENING FOR DIABETES MELLITUS: ICD-10-CM

## 2024-03-25 DIAGNOSIS — J45.30 MILD PERSISTENT ASTHMA WITHOUT COMPLICATION: ICD-10-CM

## 2024-03-25 DIAGNOSIS — Z85.118 HISTORY OF LUNG CANCER: ICD-10-CM

## 2024-03-25 DIAGNOSIS — Z12.11 COLON CANCER SCREENING: ICD-10-CM

## 2024-03-25 DIAGNOSIS — Z13.0 SCREENING, IRON DEFICIENCY ANEMIA: ICD-10-CM

## 2024-03-25 PROCEDURE — 99214 OFFICE O/P EST MOD 30 MIN: CPT | Performed by: NURSE PRACTITIONER

## 2024-03-25 PROCEDURE — 1123F ACP DISCUSS/DSCN MKR DOCD: CPT | Performed by: NURSE PRACTITIONER

## 2024-03-25 ASSESSMENT — PATIENT HEALTH QUESTIONNAIRE - PHQ9
SUM OF ALL RESPONSES TO PHQ9 QUESTIONS 1 & 2: 0
2. FEELING DOWN, DEPRESSED OR HOPELESS: NOT AT ALL
SUM OF ALL RESPONSES TO PHQ QUESTIONS 1-9: 0

## 2024-03-25 ASSESSMENT — ENCOUNTER SYMPTOMS
VOMITING: 0
SORE THROAT: 0
NAUSEA: 0
RHINORRHEA: 0
DIARRHEA: 0
CHEST TIGHTNESS: 0
ABDOMINAL PAIN: 0
COLOR CHANGE: 0
SHORTNESS OF BREATH: 0

## 2024-03-25 NOTE — PROGRESS NOTES
[facility]  [facility] PR[facility]  [address] [place] [address]  [address]  [facility] [address]  Dept: [phone]  Dept Fax: [phone]    Aliya Morgan is a 73 y.o. female who presentstoday for her medical conditions/complaints as noted below.  Aliya Morgan is c/o of  Chief Complaint   Patient presents with    Follow-up         HPI:     Presents for a follow up of hypothyroidism  Currently managed on synthroid 100 mcg daily  Denies any adverse effects  Due for annual labs, is agreeable to obtaining with repeat TSH/T4   She is agreeable to completing Cologuard  Asthma/ COPD stable with spiriva and albuterol PRN        Hemoglobin A1C (%)   Date Value   01/24/2012 5.5             ( goal A1C is < 7)   No components found for: \"LABMICR\"  LDL Cholesterol (mg/dL)   Date Value   04/22/2022 114   10/28/2020 133 (H)   01/24/2012 150 (H)       (goal LDL is <100)   AST (U/L)   Date Value   04/22/2022 14     ALT (U/L)   Date Value   04/22/2022 11     BUN (mg/dL)   Date Value   04/22/2022 11     BP Readings from Last 3 Encounters:   03/25/24 122/64   12/26/23 126/84   07/28/23 136/84          ([id number])    Past Medical History:   Diagnosis Date    Asthma     Cancer of lung (HCC) 1/27/2014    Diagnosed in 1995 Check annual chest x-ray    COPD (chronic obstructive pulmonary disease) (HCC)     ESBL (extended spectrum beta-lactamase) producing bacteria infection 07/24/2018    E. Coli urine    High cholesterol 1/23/2017    Hypothyroid     Lung nodule 1/30/2018    CT scan ordered    Mild persistent asthma without complication 7/27/2020    Need for prophylactic vaccination and inoculation against influenza 10/21/2013    Osteopenia 3/28/2017    the patient's 10-year risk for major osteoporotic fracture is 11.9% and for hip fracture is 2.1%.    S/P lobectomy of lung 1/27/2014    Seizures (HCC)       Past Surgical History:   Procedure Laterality Date    BREAST CYST EXCISION Bilateral     fibiritic

## 2024-04-22 DIAGNOSIS — M25.512 CHRONIC LEFT SHOULDER PAIN: ICD-10-CM

## 2024-04-22 DIAGNOSIS — G89.29 CHRONIC LEFT SHOULDER PAIN: ICD-10-CM

## 2024-04-22 DIAGNOSIS — G89.29 CHRONIC PAIN OF RIGHT KNEE: ICD-10-CM

## 2024-04-22 DIAGNOSIS — M25.561 CHRONIC PAIN OF RIGHT KNEE: ICD-10-CM

## 2024-04-22 RX ORDER — MELOXICAM 7.5 MG/1
7.5 TABLET ORAL DAILY
Qty: 90 TABLET | Refills: 0 | OUTPATIENT
Start: 2024-04-22

## 2024-05-15 DIAGNOSIS — E55.9 VITAMIN D DEFICIENCY: ICD-10-CM

## 2024-05-16 ENCOUNTER — TELEPHONE (OUTPATIENT)
Dept: PRIMARY CARE CLINIC | Age: 74
End: 2024-05-16

## 2024-05-16 DIAGNOSIS — J30.2 SEASONAL ALLERGIES: Primary | ICD-10-CM

## 2024-05-16 DIAGNOSIS — J41.0 SIMPLE CHRONIC BRONCHITIS (HCC): ICD-10-CM

## 2024-05-16 DIAGNOSIS — J45.30 MILD PERSISTENT ASTHMA WITHOUT COMPLICATION: ICD-10-CM

## 2024-05-16 RX ORDER — LORATADINE 10 MG/1
10 TABLET ORAL DAILY
Qty: 30 TABLET | Refills: 1 | Status: SHIPPED | OUTPATIENT
Start: 2024-05-16

## 2024-05-16 RX ORDER — ERGOCALCIFEROL 1.25 MG/1
50000 CAPSULE ORAL WEEKLY
Qty: 12 CAPSULE | Refills: 1 | Status: SHIPPED | OUTPATIENT
Start: 2024-05-16

## 2024-05-16 NOTE — TELEPHONE ENCOUNTER
Last Visit Date: 3/25/2024   Next Visit Date: 9/26/2024   Pharmacy: [facility] in [place]    Pt called into office, states she is going to be camping this weekend and would like to know if PCP can send in Zyrtec or Claritin to the pharmacy for her. Pt states she has a lot of allergies and there are a lot of trees where she will be camping this weekend. Please advise.

## 2024-05-28 ENCOUNTER — OFFICE VISIT (OUTPATIENT)
Dept: PULMONOLOGY | Age: 74
End: 2024-05-28
Payer: MEDICARE

## 2024-05-28 VITALS
OXYGEN SATURATION: 100 % | RESPIRATION RATE: 14 BRPM | HEART RATE: 76 BPM | HEIGHT: 62 IN | BODY MASS INDEX: 26.68 KG/M2 | WEIGHT: 145 LBS | SYSTOLIC BLOOD PRESSURE: 112 MMHG | TEMPERATURE: 97.2 F | DIASTOLIC BLOOD PRESSURE: 83 MMHG

## 2024-05-28 DIAGNOSIS — C34.91 MALIGNANT NEOPLASM OF RIGHT LUNG, UNSPECIFIED PART OF LUNG (HCC): ICD-10-CM

## 2024-05-28 DIAGNOSIS — Z90.2 H/O PNEUMONECTOMY: ICD-10-CM

## 2024-05-28 DIAGNOSIS — Z98.890 H/O PNEUMONECTOMY: ICD-10-CM

## 2024-05-28 DIAGNOSIS — J44.9 CHRONIC OBSTRUCTIVE PULMONARY DISEASE, UNSPECIFIED COPD TYPE (HCC): Primary | ICD-10-CM

## 2024-05-28 DIAGNOSIS — J98.4 RESTRICTIVE LUNG DISEASE: ICD-10-CM

## 2024-05-28 PROCEDURE — 1123F ACP DISCUSS/DSCN MKR DOCD: CPT | Performed by: INTERNAL MEDICINE

## 2024-05-28 PROCEDURE — 99214 OFFICE O/P EST MOD 30 MIN: CPT | Performed by: INTERNAL MEDICINE

## 2024-05-28 ASSESSMENT — ENCOUNTER SYMPTOMS
WHEEZING: 0
COUGH: 0
SHORTNESS OF BREATH: 1

## 2024-05-28 NOTE — PROGRESS NOTES
REASON FOR THE CONSULTATION:  COPD, right pneumonectomy for lung cancer  HISTORY OF PRESENT ILLNESS:    Aliya Morgan is a 74 y.o. year old female   Following up after 1 year.  This is because of insurance issues.  She has not been smoking.  No exacerbation in the last year.  She is not coughing up purulent sputum or hemoptysis.  She does have chronic sputum which is small amount.  Has grade 2 dyspnea.  Agreeable to chest x-ray.  She has albuterol and albuterol nebulized but does not use the albuterol inhaler on a daily basis.  Ran out of her Spiriva.    Last visit  Is now using Spiriva because has changed insurance and it is affordable.  Doing well with Spiriva.  No purulent sputum or hemoptysis.  Not smoking.  Dyspnea with exertion is grade 2.  Agreeable to get chest x-ray  Last visit  Patient is doing well shortness of breath is stable with exertion she did not have any hospital visits for COPD exacerbation or need steroids.  Sputum is nonpurulent no hemoptysis she is compliant with albuterol    Did not get chest xray   Not using spiriva due to cost   Last visit   Did not go for pulmonary rehab - did not receive call .   Sob with exertion stable   No hemoptysis   Cannot afford inhalers other than albuterol  Last visit    was last seen in pulmonary clinic in 2017 and she did not follow-up after that since she retired patient has noticed that she has been having progressive shortness of breath over the last few years.  She uses Spiriva but cannot afford other inhalers because of expense and she also since the Spiriva prescription to [facility].  She only uses albuterol once a day.    CT of the chest in 2018 did not show residual malignancy or evidence of malignancy.  PFT showed restrictive pattern.    She does not have cough with hemoptysis or purulent sputum.    She does not complain of active wheezing      Last visit        She had history of lung cancer in 1995 and a right pneumonectomy post chemo and radiation.

## 2024-06-28 DIAGNOSIS — E03.9 HYPOTHYROIDISM, UNSPECIFIED TYPE: ICD-10-CM

## 2024-06-28 RX ORDER — LEVOTHYROXINE SODIUM 0.1 MG/1
100 TABLET ORAL DAILY
Qty: 30 TABLET | Refills: 2 | Status: SHIPPED | OUTPATIENT
Start: 2024-06-28

## 2024-07-12 DIAGNOSIS — J30.2 SEASONAL ALLERGIES: ICD-10-CM

## 2024-07-12 RX ORDER — LORATADINE 10 MG/1
10 TABLET ORAL DAILY
Qty: 30 TABLET | Refills: 3 | Status: SHIPPED | OUTPATIENT
Start: 2024-07-12

## 2024-09-24 DIAGNOSIS — E03.9 HYPOTHYROIDISM, UNSPECIFIED TYPE: ICD-10-CM

## 2024-09-24 RX ORDER — LEVOTHYROXINE SODIUM 100 UG/1
100 TABLET ORAL DAILY
Qty: 30 TABLET | Refills: 2 | Status: SHIPPED | OUTPATIENT
Start: 2024-09-24

## 2024-09-25 ENCOUNTER — TELEPHONE (OUTPATIENT)
Dept: PRIMARY CARE CLINIC | Age: 74
End: 2024-09-25

## 2024-10-02 ENCOUNTER — OFFICE VISIT (OUTPATIENT)
Dept: PRIMARY CARE CLINIC | Age: 74
End: 2024-10-02
Payer: MEDICARE

## 2024-10-02 VITALS
RESPIRATION RATE: 15 BRPM | HEIGHT: 62 IN | HEART RATE: 101 BPM | DIASTOLIC BLOOD PRESSURE: 78 MMHG | SYSTOLIC BLOOD PRESSURE: 124 MMHG | OXYGEN SATURATION: 99 % | WEIGHT: 142.8 LBS | BODY MASS INDEX: 26.28 KG/M2

## 2024-10-02 DIAGNOSIS — F32.A MILD DEPRESSION: ICD-10-CM

## 2024-10-02 DIAGNOSIS — Z00.00 MEDICARE ANNUAL WELLNESS VISIT, SUBSEQUENT: Primary | ICD-10-CM

## 2024-10-02 PROCEDURE — G0439 PPPS, SUBSEQ VISIT: HCPCS | Performed by: NURSE PRACTITIONER

## 2024-10-02 PROCEDURE — 1123F ACP DISCUSS/DSCN MKR DOCD: CPT | Performed by: NURSE PRACTITIONER

## 2024-10-02 RX ORDER — NORTRIPTYLINE HCL 10 MG
10 CAPSULE ORAL NIGHTLY
Qty: 30 CAPSULE | Refills: 3 | Status: SHIPPED | OUTPATIENT
Start: 2024-10-02

## 2024-10-02 SDOH — ECONOMIC STABILITY: FOOD INSECURITY: WITHIN THE PAST 12 MONTHS, THE FOOD YOU BOUGHT JUST DIDN'T LAST AND YOU DIDN'T HAVE MONEY TO GET MORE.: NEVER TRUE

## 2024-10-02 SDOH — ECONOMIC STABILITY: FOOD INSECURITY: WITHIN THE PAST 12 MONTHS, YOU WORRIED THAT YOUR FOOD WOULD RUN OUT BEFORE YOU GOT MONEY TO BUY MORE.: NEVER TRUE

## 2024-10-02 SDOH — ECONOMIC STABILITY: INCOME INSECURITY: HOW HARD IS IT FOR YOU TO PAY FOR THE VERY BASICS LIKE FOOD, HOUSING, MEDICAL CARE, AND HEATING?: NOT HARD AT ALL

## 2024-10-02 ASSESSMENT — PATIENT HEALTH QUESTIONNAIRE - PHQ9
SUM OF ALL RESPONSES TO PHQ QUESTIONS 1-9: 2
1. LITTLE INTEREST OR PLEASURE IN DOING THINGS: SEVERAL DAYS
SUM OF ALL RESPONSES TO PHQ9 QUESTIONS 1 & 2: 2
2. FEELING DOWN, DEPRESSED OR HOPELESS: SEVERAL DAYS
SUM OF ALL RESPONSES TO PHQ QUESTIONS 1-9: 2

## 2024-10-02 ASSESSMENT — LIFESTYLE VARIABLES
HOW MANY STANDARD DRINKS CONTAINING ALCOHOL DO YOU HAVE ON A TYPICAL DAY: PATIENT DOES NOT DRINK
HOW OFTEN DO YOU HAVE A DRINK CONTAINING ALCOHOL: NEVER

## 2024-10-02 NOTE — PROGRESS NOTES
Medicare Annual Wellness Visit    Aliya Morgan is here for Medicare AWV and Health Maintenance (Has Cologuard box at home)    Assessment & Plan   Chronic conditions stable   Worsening depression symptoms     Mild depression  Recurrent, initiate pamelor nightly, recheck 6 weeks. No red flag complaints   -     nortriptyline (PAMELOR) 10 MG capsule; Take 1 capsule by mouth nightly, Disp-30 capsule, R-3Normal    Recommendations for Preventive Services Due: see orders and patient instructions/AVS.  Recommended screening schedule for the next 5-10 years is provided to the patient in written form: see Patient Instructions/AVS.     Return in about 6 weeks (around 11/13/2024) for Depression/Anxiety.     Subjective   The following acute and/or chronic problems were also addressed today:  Presents for AWV   Feels low mood related to sick sister and unable to eat many things due to poorly fitting dentures   Has had low energy and lack of motivation which is unlike her usually   Is interested in medication for low mood, was on pamelor in past and worked well for her   Denies SI/HI, has fatigue and poor sleep   Feels they do not break up meat efficiently   Levothyroxine stable with levothyroxine 100mcg   Asthma/COPD stable, just using albuterol PRN. Previously on spiriva, no longer using   On vitamin D supplement     Patient's complete Health Risk Assessment and screening values have been reviewed and are found in Flowsheets. The following problems were reviewed today and where indicated follow up appointments were made and/or referrals ordered.    Positive Risk Factor Screenings with Interventions:                Inactivity:  On average, how many days per week do you engage in moderate to strenuous exercise (like a brisk walk)?: 2 days (!) Abnormal  On average, how many minutes do you engage in exercise at this level?: 30 min  Interventions:  Recommendations: patient agrees to exercise for at least 150 minutes/week    Poor

## 2024-10-03 NOTE — PATIENT INSTRUCTIONS
Learning About Being Active as an Older Adult  Why is being active important as you get older?     Being active is one of the best things you can do for your health. And it's never too late to start. Being active--or getting active, if you aren't already--has definite benefits. It can:  Give you more energy,  Keep your mind sharp.  Improve balance to reduce your risk of falls.  Help you manage chronic illness with fewer medicines.  No matter how old you are, how fit you are, or what health problems you have, there is a form of activity that will work for you. And the more physical activity you can do, the better your overall health will be.  What kinds of activity can help you stay healthy?  Being more active will make your daily activities easier. Physical activity includes planned exercise and things you do in daily life. There are four types of activity:  Aerobic.  Doing aerobic activity makes your heart and lungs strong.  Includes walking, dancing, and gardening.  Aim for at least 2½ hours spread throughout the week.  It improves your energy and can help you sleep better.  Muscle-strengthening.  This type of activity can help maintain muscle and strengthen bones.  Includes climbing stairs, using resistance bands, and lifting or carrying heavy loads.  Aim for at least twice a week.  It can help protect the knees and other joints.  Stretching.  Stretching gives you better range of motion in joints and muscles.  Includes upper arm stretches, calf stretches, and gentle yoga.  Aim for at least twice a week, preferably after your muscles are warmed up from other activities.  It can help you function better in daily life.  Balancing.  This helps you stay coordinated and have good posture.  Includes heel-to-toe walking, candida chi, and certain types of yoga.  Aim for at least 3 days a week.  It can reduce your risk of falling.  Even if you have a hard time meeting the recommendations, it's better to be more active

## 2024-11-01 DIAGNOSIS — E55.9 VITAMIN D DEFICIENCY: ICD-10-CM

## 2024-11-01 RX ORDER — ERGOCALCIFEROL 1.25 MG/1
50000 CAPSULE, LIQUID FILLED ORAL WEEKLY
Qty: 12 CAPSULE | Refills: 1 | Status: SHIPPED | OUTPATIENT
Start: 2024-11-01

## 2024-11-06 ENCOUNTER — HOSPITAL ENCOUNTER (OUTPATIENT)
Age: 74
Discharge: HOME | End: 2024-11-06
Payer: MEDICARE

## 2024-11-06 DIAGNOSIS — Z13.6 SCREENING FOR CARDIOVASCULAR CONDITION: ICD-10-CM

## 2024-11-06 DIAGNOSIS — E03.9 HYPOTHYROIDISM, UNSPECIFIED TYPE: ICD-10-CM

## 2024-11-06 DIAGNOSIS — Z13.0 SCREENING, IRON DEFICIENCY ANEMIA: ICD-10-CM

## 2024-11-06 DIAGNOSIS — Z13.1 SCREENING FOR DIABETES MELLITUS: ICD-10-CM

## 2024-11-06 LAB
ALBUMIN SERPL-MCNC: 4.4 G/DL (ref 3.5–5.2)
ALBUMIN/GLOB SERPL: 1.5 {RATIO} (ref 1–2.5)
ALP SERPL-CCNC: 83 U/L (ref 35–104)
ALT SERPL-CCNC: 10 U/L (ref 10–35)
ANION GAP SERPL CALCULATED.3IONS-SCNC: 11 MMOL/L (ref 9–16)
AST SERPL-CCNC: 24 U/L (ref 10–35)
BASOPHILS # BLD: 0.05 K/UL (ref 0–0.2)
BASOPHILS NFR BLD: 1 % (ref 0–2)
BILIRUB SERPL-MCNC: 0.4 MG/DL (ref 0–1.2)
BUN SERPL-MCNC: 22 MG/DL (ref 8–23)
CALCIUM SERPL-MCNC: 10 MG/DL (ref 8.6–10.4)
CHLORIDE SERPL-SCNC: 104 MMOL/L (ref 98–107)
CHOLEST SERPL-MCNC: 226 MG/DL (ref 0–199)
CHOLESTEROL/HDL RATIO: 3.5
CO2 SERPL-SCNC: 26 MMOL/L (ref 20–31)
CREAT SERPL-MCNC: 1 MG/DL (ref 0.6–0.9)
EOSINOPHIL # BLD: 0.1 K/UL (ref 0–0.44)
EOSINOPHILS RELATIVE PERCENT: 1 % (ref 1–4)
ERYTHROCYTE [DISTWIDTH] IN BLOOD BY AUTOMATED COUNT: 13 % (ref 11.8–14.4)
GFR, ESTIMATED: 59 ML/MIN/1.73M2
GLUCOSE SERPL-MCNC: 89 MG/DL (ref 74–99)
HCT VFR BLD AUTO: 44.3 % (ref 36.3–47.1)
HDLC SERPL-MCNC: 64 MG/DL
HGB BLD-MCNC: 14.2 G/DL (ref 11.9–15.1)
IMM GRANULOCYTES # BLD AUTO: 0.03 K/UL (ref 0–0.3)
IMM GRANULOCYTES NFR BLD: 0 %
LDLC SERPL CALC-MCNC: 146 MG/DL (ref 0–100)
LYMPHOCYTES NFR BLD: 1.54 K/UL (ref 1.1–3.7)
LYMPHOCYTES RELATIVE PERCENT: 22 % (ref 24–43)
MCH RBC QN AUTO: 28.4 PG (ref 25.2–33.5)
MCHC RBC AUTO-ENTMCNC: 32.1 G/DL (ref 28.4–34.8)
MCV RBC AUTO: 88.6 FL (ref 82.6–102.9)
MONOCYTES NFR BLD: 0.64 K/UL (ref 0.1–1.2)
MONOCYTES NFR BLD: 9 % (ref 3–12)
NEUTROPHILS NFR BLD: 67 % (ref 36–65)
NEUTS SEG NFR BLD: 4.74 K/UL (ref 1.5–8.1)
NRBC BLD-RTO: 0 PER 100 WBC
PLATELET # BLD AUTO: 236 K/UL (ref 138–453)
PMV BLD AUTO: 12.9 FL (ref 8.1–13.5)
POTASSIUM SERPL-SCNC: 5 MMOL/L (ref 3.7–5.3)
PROT SERPL-MCNC: 7.4 G/DL (ref 6.6–8.7)
RBC # BLD AUTO: 5 M/UL (ref 3.95–5.11)
SODIUM SERPL-SCNC: 141 MMOL/L (ref 136–145)
T4 FREE SERPL-MCNC: 1.4 NG/DL (ref 0.9–1.7)
TRIGL SERPL-MCNC: 78 MG/DL
TSH SERPL DL<=0.05 MIU/L-ACNC: 10.7 UIU/ML (ref 0.27–4.2)
VLDLC SERPL CALC-MCNC: 16 MG/DL (ref 1–30)
WBC OTHER # BLD: 7.1 K/UL (ref 3.5–11.3)

## 2024-11-06 PROCEDURE — 84443 ASSAY THYROID STIM HORMONE: CPT

## 2024-11-06 PROCEDURE — 80061 LIPID PANEL: CPT

## 2024-11-06 PROCEDURE — 85025 COMPLETE CBC W/AUTO DIFF WBC: CPT

## 2024-11-06 PROCEDURE — 36415 COLL VENOUS BLD VENIPUNCTURE: CPT

## 2024-11-06 PROCEDURE — 84439 ASSAY OF FREE THYROXINE: CPT

## 2024-11-06 PROCEDURE — 80053 COMPREHEN METABOLIC PANEL: CPT

## 2024-11-08 DIAGNOSIS — E03.9 HYPOTHYROIDISM, UNSPECIFIED TYPE: ICD-10-CM

## 2024-11-08 DIAGNOSIS — E78.00 ELEVATED LDL CHOLESTEROL LEVEL: Primary | ICD-10-CM

## 2024-11-08 RX ORDER — LEVOTHYROXINE SODIUM 125 UG/1
125 TABLET ORAL DAILY
Qty: 90 TABLET | Refills: 1 | Status: SHIPPED | OUTPATIENT
Start: 2024-11-08

## 2024-11-08 RX ORDER — ROSUVASTATIN CALCIUM 5 MG/1
5 TABLET, COATED ORAL NIGHTLY
Qty: 30 TABLET | Refills: 3 | Status: SHIPPED | OUTPATIENT
Start: 2024-11-08

## 2024-11-13 ENCOUNTER — OFFICE VISIT (OUTPATIENT)
Dept: PRIMARY CARE CLINIC | Age: 74
End: 2024-11-13

## 2024-11-13 VITALS — SYSTOLIC BLOOD PRESSURE: 108 MMHG | DIASTOLIC BLOOD PRESSURE: 68 MMHG | BODY MASS INDEX: 25.92 KG/M2 | WEIGHT: 140.6 LBS

## 2024-11-13 DIAGNOSIS — K08.89 DENTURES COMPLICATING CHEWING: ICD-10-CM

## 2024-11-13 DIAGNOSIS — Z97.2 DENTURES COMPLICATING CHEWING: ICD-10-CM

## 2024-11-13 DIAGNOSIS — F32.A MILD DEPRESSION: Primary | ICD-10-CM

## 2024-11-13 DIAGNOSIS — E03.9 HYPOTHYROIDISM, UNSPECIFIED TYPE: ICD-10-CM

## 2024-11-13 RX ORDER — NORTRIPTYLINE HYDROCHLORIDE 25 MG/1
25 CAPSULE ORAL NIGHTLY
Qty: 30 CAPSULE | Refills: 3 | Status: SHIPPED | OUTPATIENT
Start: 2024-11-13

## 2024-11-13 ASSESSMENT — PATIENT HEALTH QUESTIONNAIRE - PHQ9
8. MOVING OR SPEAKING SO SLOWLY THAT OTHER PEOPLE COULD HAVE NOTICED. OR THE OPPOSITE, BEING SO FIGETY OR RESTLESS THAT YOU HAVE BEEN MOVING AROUND A LOT MORE THAN USUAL: NOT AT ALL
SUM OF ALL RESPONSES TO PHQ QUESTIONS 1-9: 1
4. FEELING TIRED OR HAVING LITTLE ENERGY: NOT AT ALL
SUM OF ALL RESPONSES TO PHQ QUESTIONS 1-9: 1
2. FEELING DOWN, DEPRESSED OR HOPELESS: NOT AT ALL
5. POOR APPETITE OR OVEREATING: SEVERAL DAYS
SUM OF ALL RESPONSES TO PHQ QUESTIONS 1-9: 1
7. TROUBLE CONCENTRATING ON THINGS, SUCH AS READING THE NEWSPAPER OR WATCHING TELEVISION: NOT AT ALL
3. TROUBLE FALLING OR STAYING ASLEEP: NOT AT ALL
SUM OF ALL RESPONSES TO PHQ QUESTIONS 1-9: 1
10. IF YOU CHECKED OFF ANY PROBLEMS, HOW DIFFICULT HAVE THESE PROBLEMS MADE IT FOR YOU TO DO YOUR WORK, TAKE CARE OF THINGS AT HOME, OR GET ALONG WITH OTHER PEOPLE: NOT DIFFICULT AT ALL
1. LITTLE INTEREST OR PLEASURE IN DOING THINGS: NOT AT ALL
6. FEELING BAD ABOUT YOURSELF - OR THAT YOU ARE A FAILURE OR HAVE LET YOURSELF OR YOUR FAMILY DOWN: NOT AT ALL
SUM OF ALL RESPONSES TO PHQ9 QUESTIONS 1 & 2: 0
9. THOUGHTS THAT YOU WOULD BE BETTER OFF DEAD, OR OF HURTING YOURSELF: NOT AT ALL

## 2024-11-13 ASSESSMENT — ENCOUNTER SYMPTOMS
SORE THROAT: 0
CHEST TIGHTNESS: 0
DIARRHEA: 0
ABDOMINAL PAIN: 0
SHORTNESS OF BREATH: 0
NAUSEA: 0
RHINORRHEA: 0
VOMITING: 0
COLOR CHANGE: 0

## 2024-11-13 NOTE — PROGRESS NOTES
[facility]  [facility] PRIMARY [facility]ARE  [address] [place] [address]  [address]  [facility] [address]  Dept: [phone]  Dept Fax: [phone]    Aliya Morgan is a 74 y.o. female who presentstoday for her medical conditions/complaints as noted below.  Aliya Morgan is c/o of  Chief Complaint   Patient presents with    Depression         HPI:     Presents for recheck of depression after resuming pamelor   Has worsening symptoms related to stress. Her sister passed away recently and her brother is faced with testicular cancer   Son also recently struggling with health issues and she is having difficulty with her dentures fitting and not being able to eat like usual   She denies SI/HI but endorses stress   She is feeling more active and energetic than she was. Previously, she was putting off many tasks and ignoring things that needed done due to low mood. This is improved  Reviewed recent labs, hypothyroid uncontrolled, she admits to poor medication compliance  Would like to stay on levothyroxine 100mcg daily instead of increasing to 125mcg daily and will recheck and adjust as indicated         Hemoglobin A1C (%)   Date Value   01/24/2012 5.5             ( goal A1C is < 7)   No components found for: \"LABMICR\"  No components found for: \"LDLCHOLESTEROL\", \"LDLCALC\"    (goal LDL is <100)   AST (U/L)   Date Value   11/06/2024 24     ALT (U/L)   Date Value   11/06/2024 10     BUN (mg/dL)   Date Value   11/06/2024 22     BP Readings from Last 3 Encounters:   11/13/24 108/68   10/02/24 124/78   05/28/24 112/83          ([id number])    Past Medical History:   Diagnosis Date    Asthma     Cancer of lung (HCC) 1/27/2014    Diagnosed in 1995 Check annual chest x-ray    COPD (chronic obstructive pulmonary disease) ([facility])     ESBL (extended spectrum beta-lactamase) producing bacteria infection 07/24/2018    E. Coli urine    High cholesterol 1/23/2017    Hypothyroid     Lung nodule 1/30/2018    CT scan ordered

## 2024-12-23 ENCOUNTER — TELEPHONE (OUTPATIENT)
Dept: PRIMARY CARE CLINIC | Age: 74
End: 2024-12-23

## 2024-12-23 NOTE — TELEPHONE ENCOUNTER
Called and spoke with patient. Patient scheduled for 1/16/24. Pt was offered walk in and declined.

## 2024-12-23 NOTE — TELEPHONE ENCOUNTER
----- Message from Luis Armando MEHTA sent at 12/23/2024  2:26 PM EST -----  Regarding: ECC Escalation To Practice  ECC Escalation To Practice      Type of Escalation: Acute Care Symptom  --------------------------------------------------------------------------------------------------------------------------    Information for Provider:  Patient is looking for appointment for: Symptom Other  Reasons for Message: Patient disconnected     Additional Information The patient not feeling well (she want to schedule at December 30 2024 Monday at 12:00 in the afternoon)  --------------------------------------------------------------------------------------------------------------------------    Relationship to Patient: Self     Call Back Info: OK to leave message on voicemail  Preferred Call Back Number: Phone [phone]

## 2025-01-16 ENCOUNTER — OFFICE VISIT (OUTPATIENT)
Dept: PRIMARY CARE CLINIC | Age: 75
End: 2025-01-16
Payer: MEDICARE

## 2025-01-16 VITALS
DIASTOLIC BLOOD PRESSURE: 70 MMHG | SYSTOLIC BLOOD PRESSURE: 110 MMHG | RESPIRATION RATE: 15 BRPM | WEIGHT: 140.6 LBS | OXYGEN SATURATION: 97 % | BODY MASS INDEX: 25.92 KG/M2 | HEART RATE: 65 BPM

## 2025-01-16 DIAGNOSIS — J45.30 MILD PERSISTENT ASTHMA WITHOUT COMPLICATION: ICD-10-CM

## 2025-01-16 DIAGNOSIS — C34.91 MALIGNANT NEOPLASM OF RIGHT LUNG, UNSPECIFIED PART OF LUNG (HCC): ICD-10-CM

## 2025-01-16 DIAGNOSIS — J41.0 SIMPLE CHRONIC BRONCHITIS (HCC): ICD-10-CM

## 2025-01-16 DIAGNOSIS — E78.00 ELEVATED CHOLESTEROL: ICD-10-CM

## 2025-01-16 DIAGNOSIS — R42 DIZZINESS: ICD-10-CM

## 2025-01-16 DIAGNOSIS — R06.09 EXERTIONAL DYSPNEA: Primary | ICD-10-CM

## 2025-01-16 DIAGNOSIS — H66.92 ACUTE OTITIS MEDIA, LEFT: ICD-10-CM

## 2025-01-16 PROCEDURE — 1159F MED LIST DOCD IN RCRD: CPT | Performed by: NURSE PRACTITIONER

## 2025-01-16 PROCEDURE — 1123F ACP DISCUSS/DSCN MKR DOCD: CPT | Performed by: NURSE PRACTITIONER

## 2025-01-16 PROCEDURE — 99215 OFFICE O/P EST HI 40 MIN: CPT | Performed by: NURSE PRACTITIONER

## 2025-01-16 RX ORDER — NEBULIZER ACCESSORIES
1 KIT MISCELLANEOUS DAILY PRN
Qty: 1 KIT | Refills: 0 | Status: SHIPPED | OUTPATIENT
Start: 2025-01-16

## 2025-01-16 RX ORDER — ALBUTEROL SULFATE 0.83 MG/ML
2.5 SOLUTION RESPIRATORY (INHALATION) EVERY 6 HOURS PRN
Qty: 120 EACH | Refills: 3 | Status: SHIPPED | OUTPATIENT
Start: 2025-01-16

## 2025-01-16 RX ORDER — LEVOTHYROXINE SODIUM 100 UG/1
100 TABLET ORAL DAILY
COMMUNITY

## 2025-01-16 RX ORDER — DOXYCYCLINE HYCLATE 100 MG
100 TABLET ORAL 2 TIMES DAILY
Qty: 20 TABLET | Refills: 0 | Status: SHIPPED | OUTPATIENT
Start: 2025-01-16 | End: 2025-01-26

## 2025-01-16 SDOH — ECONOMIC STABILITY: FOOD INSECURITY: WITHIN THE PAST 12 MONTHS, YOU WORRIED THAT YOUR FOOD WOULD RUN OUT BEFORE YOU GOT MONEY TO BUY MORE.: NEVER TRUE

## 2025-01-16 SDOH — ECONOMIC STABILITY: FOOD INSECURITY: WITHIN THE PAST 12 MONTHS, THE FOOD YOU BOUGHT JUST DIDN'T LAST AND YOU DIDN'T HAVE MONEY TO GET MORE.: NEVER TRUE

## 2025-01-16 ASSESSMENT — ENCOUNTER SYMPTOMS
CHEST TIGHTNESS: 0
DIARRHEA: 0
ABDOMINAL PAIN: 0
SHORTNESS OF BREATH: 0
RHINORRHEA: 0
COLOR CHANGE: 0
NAUSEA: 0
SORE THROAT: 0
VOMITING: 0

## 2025-01-16 ASSESSMENT — PATIENT HEALTH QUESTIONNAIRE - PHQ9
4. FEELING TIRED OR HAVING LITTLE ENERGY: NOT AT ALL
1. LITTLE INTEREST OR PLEASURE IN DOING THINGS: NOT AT ALL
SUM OF ALL RESPONSES TO PHQ QUESTIONS 1-9: 0
8. MOVING OR SPEAKING SO SLOWLY THAT OTHER PEOPLE COULD HAVE NOTICED. OR THE OPPOSITE, BEING SO FIGETY OR RESTLESS THAT YOU HAVE BEEN MOVING AROUND A LOT MORE THAN USUAL: NOT AT ALL
6. FEELING BAD ABOUT YOURSELF - OR THAT YOU ARE A FAILURE OR HAVE LET YOURSELF OR YOUR FAMILY DOWN: NOT AT ALL
5. POOR APPETITE OR OVEREATING: NOT AT ALL
3. TROUBLE FALLING OR STAYING ASLEEP: NOT AT ALL
10. IF YOU CHECKED OFF ANY PROBLEMS, HOW DIFFICULT HAVE THESE PROBLEMS MADE IT FOR YOU TO DO YOUR WORK, TAKE CARE OF THINGS AT HOME, OR GET ALONG WITH OTHER PEOPLE: NOT DIFFICULT AT ALL
7. TROUBLE CONCENTRATING ON THINGS, SUCH AS READING THE NEWSPAPER OR WATCHING TELEVISION: NOT AT ALL
SUM OF ALL RESPONSES TO PHQ QUESTIONS 1-9: 0
SUM OF ALL RESPONSES TO PHQ QUESTIONS 1-9: 0
9. THOUGHTS THAT YOU WOULD BE BETTER OFF DEAD, OR OF HURTING YOURSELF: NOT AT ALL
2. FEELING DOWN, DEPRESSED OR HOPELESS: NOT AT ALL
SUM OF ALL RESPONSES TO PHQ QUESTIONS 1-9: 0
SUM OF ALL RESPONSES TO PHQ9 QUESTIONS 1 & 2: 0

## 2025-01-16 NOTE — PROGRESS NOTES
asthma without complication  albuterol (PROVENTIL) (2.5 MG/3ML) 0.083% nebulizer solution    Respiratory Therapy Supplies (NEBULIZER/TUBING/MOUTHPIECE) KIT      6. Malignant neoplasm of right lung, unspecified part of lung (HCC)        7. Elevated cholesterol  Lipid, Fasting                :   Assessment & Plan     Assessment & Plan  1. Dyspnea on exertion/ dizziness  Her symptoms of dyspnea, diaphoresis, and nausea during physical activity raise concerns about potential coronary artery blockages. A stress test will be ordered to evaluate her cardiac function. If she is unable to achieve the target heart rate on the treadmill, a chemical stress test will be considered.  She denies chest pain    2. Left otitis media.  She presents with lymphadenopathy, erythema, and effusion in the left ear, suggestive of an infection. An antibiotic regimen will be initiated for the left ear infection. She is advised to take Tylenol and ibuprofen as needed for pain and fever management. If there is no improvement in her symptoms after several weeks, an ultrasound of the cervical lymph nodes may be considered.    3. Chronic obstructive pulmonary disease (COPD).  4. Asthma.  She is scheduled for a follow-up appointment with pulm on 07/28/2024. A prescription for albuterol solution and a nebulizer kit will be provided     5. Hypothyroidism.  Her TSH levels were previously elevated, leading to an increase in her levothyroxine dosage. A recheck of her thyroid function will be conducted today.    6. Hypercholesterolemia.  Her cholesterol levels were elevated, and her 10-year risk score for cardiovascular events is 12 percent, which is above the desired threshold of 7 percent. She is advised to start taking the prescribed cholesterol medication at night. Her cholesterol levels will be rechecked in a 3 months.      Return if symptoms worsen or fail to improve.     Patient given educational materials - see patient instructions.  Discussed

## 2025-01-17 ENCOUNTER — HOSPITAL ENCOUNTER (OUTPATIENT)
Age: 75
Setting detail: SPECIMEN
Discharge: HOME | End: 2025-01-17

## 2025-01-17 ENCOUNTER — TELEPHONE (OUTPATIENT)
Dept: PRIMARY CARE CLINIC | Age: 75
End: 2025-01-17

## 2025-01-17 DIAGNOSIS — E03.9 HYPOTHYROIDISM, UNSPECIFIED TYPE: Primary | ICD-10-CM

## 2025-01-17 DIAGNOSIS — E03.9 HYPOTHYROIDISM, UNSPECIFIED TYPE: ICD-10-CM

## 2025-01-17 LAB
T4 FREE SERPL-MCNC: 1.8 NG/DL (ref 0.9–1.7)
TSH SERPL DL<=0.05 MIU/L-ACNC: 0.2 UIU/ML (ref 0.27–4.2)

## 2025-01-17 NOTE — TELEPHONE ENCOUNTER
Writer called and told patient that PCP would like her to get her lipid panel drawn on 4/16/25 and that it is a fasting lab.    She verbalized understanding

## 2025-01-23 ENCOUNTER — TELEPHONE (OUTPATIENT)
Age: 75
End: 2025-01-23

## 2025-01-23 NOTE — TELEPHONE ENCOUNTER
Pre procedure call completed.  Location and time confirmed.  Nothing to eat or drink after midnight.  No caffeine. Verbal understanding per patient.

## 2025-01-24 ENCOUNTER — HOSPITAL ENCOUNTER (EMERGENCY)
Age: 75
Discharge: HOME | End: 2025-01-24
Attending: EMERGENCY MEDICINE
Payer: MEDICARE

## 2025-01-24 ENCOUNTER — HOSPITAL ENCOUNTER (OUTPATIENT)
Age: 75
Discharge: HOME | End: 2025-01-26
Payer: MEDICARE

## 2025-01-24 ENCOUNTER — HOSPITAL ENCOUNTER (OUTPATIENT)
Dept: GENERAL RADIOLOGY | Age: 75
Discharge: HOME | End: 2025-01-26
Payer: MEDICARE

## 2025-01-24 VITALS
SYSTOLIC BLOOD PRESSURE: 156 MMHG | HEART RATE: 108 BPM | HEIGHT: 61 IN | WEIGHT: 140 LBS | TEMPERATURE: 98.2 F | DIASTOLIC BLOOD PRESSURE: 71 MMHG | RESPIRATION RATE: 16 BRPM | BODY MASS INDEX: 26.43 KG/M2 | OXYGEN SATURATION: 100 %

## 2025-01-24 DIAGNOSIS — C34.91 MALIGNANT NEOPLASM OF RIGHT LUNG, UNSPECIFIED PART OF LUNG (HCC): ICD-10-CM

## 2025-01-24 DIAGNOSIS — R42 DIZZINESS: ICD-10-CM

## 2025-01-24 DIAGNOSIS — H65.02 ACUTE SEROUS OTITIS MEDIA OF LEFT EAR, RECURRENCE NOT SPECIFIED: Primary | ICD-10-CM

## 2025-01-24 DIAGNOSIS — Z98.890 H/O PNEUMONECTOMY: ICD-10-CM

## 2025-01-24 DIAGNOSIS — H69.92 DYSFUNCTION OF LEFT EUSTACHIAN TUBE: ICD-10-CM

## 2025-01-24 DIAGNOSIS — R06.09 EXERTIONAL DYSPNEA: ICD-10-CM

## 2025-01-24 DIAGNOSIS — Z90.2 H/O PNEUMONECTOMY: ICD-10-CM

## 2025-01-24 DIAGNOSIS — J44.9 CHRONIC OBSTRUCTIVE PULMONARY DISEASE, UNSPECIFIED COPD TYPE (HCC): ICD-10-CM

## 2025-01-24 LAB
STRESS BASELINE DIAS BP: 72 MMHG
STRESS BASELINE HR: 106 BPM
STRESS BASELINE SYS BP: 158 MMHG
STRESS ESTIMATED WORKLOAD: 7 METS
STRESS EXERCISE DUR MIN: 5 MIN
STRESS EXERCISE DUR SEC: 7 SEC
STRESS PEAK DIAS BP: 80 MMHG
STRESS PEAK SYS BP: 197 MMHG
STRESS PERCENT HR ACHIEVED: 101 %
STRESS POST PEAK HR: 148 BPM
STRESS RATE PRESSURE PRODUCT: NORMAL BPM*MMHG
STRESS TARGET HR: 147 BPM

## 2025-01-24 PROCEDURE — 93018 CV STRESS TEST I&R ONLY: CPT | Performed by: INTERNAL MEDICINE

## 2025-01-24 PROCEDURE — 99283 EMERGENCY DEPT VISIT LOW MDM: CPT | Performed by: EMERGENCY MEDICINE

## 2025-01-24 PROCEDURE — 93017 CV STRESS TEST TRACING ONLY: CPT

## 2025-01-24 PROCEDURE — 93016 CV STRESS TEST SUPVJ ONLY: CPT | Performed by: INTERNAL MEDICINE

## 2025-01-24 PROCEDURE — 71046 X-RAY EXAM CHEST 2 VIEWS: CPT

## 2025-01-24 RX ORDER — ATROPINE SULFATE 0.1 MG/ML
0.5 INJECTION INTRAVENOUS EVERY 5 MIN PRN
Status: DISCONTINUED | OUTPATIENT
Start: 2025-01-24 | End: 2025-01-24

## 2025-01-24 RX ORDER — SODIUM CHLORIDE 0.9 % (FLUSH) 0.9 %
5-40 SYRINGE (ML) INJECTION PRN
Status: DISCONTINUED | OUTPATIENT
Start: 2025-01-24 | End: 2025-01-24

## 2025-01-24 RX ORDER — NITROGLYCERIN 0.4 MG/1
0.4 TABLET SUBLINGUAL EVERY 5 MIN PRN
Status: DISCONTINUED | OUTPATIENT
Start: 2025-01-24 | End: 2025-01-24

## 2025-01-24 RX ORDER — METOPROLOL TARTRATE 1 MG/ML
5 INJECTION, SOLUTION INTRAVENOUS EVERY 5 MIN PRN
Status: DISCONTINUED | OUTPATIENT
Start: 2025-01-24 | End: 2025-01-24

## 2025-01-24 RX ORDER — PREDNISONE 50 MG/1
50 TABLET ORAL DAILY
Qty: 5 TABLET | Refills: 0 | Status: SHIPPED | OUTPATIENT
Start: 2025-01-24

## 2025-01-24 RX ORDER — SODIUM CHLORIDE 9 MG/ML
500 INJECTION, SOLUTION INTRAVENOUS CONTINUOUS PRN
Status: DISCONTINUED | OUTPATIENT
Start: 2025-01-24 | End: 2025-01-24

## 2025-01-24 RX ORDER — ALBUTEROL SULFATE 90 UG/1
2 INHALANT RESPIRATORY (INHALATION) PRN
Status: DISCONTINUED | OUTPATIENT
Start: 2025-01-24 | End: 2025-01-24

## 2025-01-24 ASSESSMENT — PAIN SCALES - GENERAL: PAINLEVEL_OUTOF10: 2

## 2025-01-24 ASSESSMENT — PAIN - FUNCTIONAL ASSESSMENT: PAIN_FUNCTIONAL_ASSESSMENT: 0-10

## 2025-01-24 NOTE — ED PROVIDER NOTES
[facility] Emergency Department  [address]  [facility] [address]  Phone: [phone]  Fax: [phone]  EMERGENCY DEPARTMENT ENCOUNTER      Pt Name: Aliya Morgan  MRN: [id number]  Birthdate 1950  Date of evaluation: 1/24/2025    CHIEF COMPLAINT       Chief Complaint   Patient presents with    Ear Fullness     Decreased hearing in L ear, dx ear infection and abx started on Friday, states has 3 more days left, wears hearing aids bilat but only R today       HISTORY OF PRESENT ILLNESS    Aliya Morgan is a 74 y.o. female who presents to the emergency room complaining of decreased hearing to left ear.  She does wear hearing aids.  She was seen about a week ago and started on doxycycline which she says is not helping.  She denies any fevers or chills no cough.  She has had sinus trouble for the past month.  She sometimes feels dizzy.  She denies any chest pain or shortness of breath.  Complaining only of left earache.    REVIEW OF SYSTEMS       Constitutional: No fevers or chills   HENT: No sore throat, rhinorrhea, positive left earache   Eyes: No blurry vision or double vision no drainage   Musculoskeletal: No facial swelling or pain   Skin: No facial rash   Neurological: No headache    PAST MEDICAL HISTORY    has a past medical history of Asthma, Cancer of lung ([facility]), COPD (chronic obstructive pulmonary disease) ([facility]), ESBL (extended spectrum beta-lactamase) producing bacteria infection, High cholesterol, Hypothyroid, Lung nodule, Mild persistent asthma without complication, Need for prophylactic vaccination and inoculation against influenza, Osteopenia, S/P lobectomy of lung, and Seizures ([facility]).    SURGICAL HISTORY      has a past surgical history that includes Cholecystectomy; Tonsillectomy; Tubal ligation; Breast cyst excision (Bilateral); ovarian cyst removal (Bilateral); and Lung removal, total (Right).    CURRENT MEDICATIONS       Previous Medications    ALBUTEROL (PROVENTIL) (2.5

## 2025-01-24 NOTE — DISCHARGE INSTRUCTIONS
Take medications as prescribed  May take decongestants over-the-counter as discussed  Follow-up with ear nose and throat specialist.  Return immediately if any worsening symptoms or any other concerns    Please understand that early in the process of an illness or injury, an emergency department workup can be falsely reassuring.      Tell us how we did visit: [email or web address]   and let us know about your experience

## 2025-01-24 NOTE — PATIENT INSTRUCTIONS
Please call [facility] Scheduling, p) [phone], to schedule any tests/ imaging that has been ordered for you.      Pt was given CT order and referral for ENT. She wanted to check insurance. She will schedule both.    Referral was faxed to Dr. Cantu's office at (f) [phone]    Nebulizer order and the phone number to [facility] walk in on [address] was given to the pt for her nebulizer supplies.

## 2025-01-27 ENCOUNTER — TELEPHONE (OUTPATIENT)
Dept: PRIMARY CARE CLINIC | Age: 75
End: 2025-01-27

## 2025-01-27 DIAGNOSIS — J45.30 MILD PERSISTENT ASTHMA WITHOUT COMPLICATION: ICD-10-CM

## 2025-01-27 DIAGNOSIS — J41.0 SIMPLE CHRONIC BRONCHITIS (HCC): ICD-10-CM

## 2025-01-27 RX ORDER — NEBULIZER ACCESSORIES
1 KIT MISCELLANEOUS DAILY PRN
Qty: 1 KIT | Refills: 0 | Status: SHIPPED | OUTPATIENT
Start: 2025-01-27

## 2025-01-27 NOTE — TELEPHONE ENCOUNTER
[facility] faxed communication that patient only needs tubing for nebulizer ,  they are requesting a script for only the tubing.    Writer unable to pend script for only tubing.    Please advise

## 2025-01-28 ENCOUNTER — OFFICE VISIT (OUTPATIENT)
Dept: PULMONOLOGY | Age: 75
End: 2025-01-28
Payer: MEDICARE

## 2025-01-28 VITALS
BODY MASS INDEX: 26.47 KG/M2 | OXYGEN SATURATION: 99 % | WEIGHT: 140.2 LBS | HEART RATE: 104 BPM | SYSTOLIC BLOOD PRESSURE: 118 MMHG | RESPIRATION RATE: 12 BRPM | HEIGHT: 61 IN | DIASTOLIC BLOOD PRESSURE: 83 MMHG

## 2025-01-28 DIAGNOSIS — J44.9 CHRONIC OBSTRUCTIVE PULMONARY DISEASE, UNSPECIFIED COPD TYPE (HCC): Primary | ICD-10-CM

## 2025-01-28 DIAGNOSIS — Z98.890 H/O PNEUMONECTOMY: ICD-10-CM

## 2025-01-28 DIAGNOSIS — C34.91 MALIGNANT NEOPLASM OF RIGHT LUNG, UNSPECIFIED PART OF LUNG (HCC): ICD-10-CM

## 2025-01-28 DIAGNOSIS — Z90.2 H/O PNEUMONECTOMY: ICD-10-CM

## 2025-01-28 DIAGNOSIS — R59.0 ENLARGED LYMPH NODE IN NECK: ICD-10-CM

## 2025-01-28 DIAGNOSIS — H65.92 LEFT SEROUS OTITIS MEDIA, UNSPECIFIED CHRONICITY: ICD-10-CM

## 2025-01-28 DIAGNOSIS — J98.4 RESTRICTIVE LUNG DISEASE: ICD-10-CM

## 2025-01-28 PROCEDURE — 1123F ACP DISCUSS/DSCN MKR DOCD: CPT | Performed by: INTERNAL MEDICINE

## 2025-01-28 PROCEDURE — 99214 OFFICE O/P EST MOD 30 MIN: CPT | Performed by: INTERNAL MEDICINE

## 2025-01-28 PROCEDURE — 1159F MED LIST DOCD IN RCRD: CPT | Performed by: INTERNAL MEDICINE

## 2025-01-28 RX ORDER — TIOTROPIUM BROMIDE 18 UG/1
18 CAPSULE ORAL; RESPIRATORY (INHALATION) DAILY
Qty: 30 CAPSULE | Refills: 11 | Status: SHIPPED | OUTPATIENT
Start: 2025-01-28

## 2025-01-28 NOTE — PROGRESS NOTES
Aliya Morgan  1/28/2025  Chief Complaint   Patient presents with    COPD     Follow up         Aliya Morgan  74 y.o. female   History of Present Illness  The patient presents for evaluation of chronic obstructive pulmonary disease.    She reports no significant changes in her health status since her last visit in May 2024. She has been abstaining from smoking and continues to use Spiriva as part of her treatment regimen. However, she has discontinued the use of her emergency inhaler due to an excess supply at home. Consequently, she was not provided with her Spiriva. She is requesting a new prescription for Spiriva but does not require a monthly supply of the emergency inhaler.    She recently completed a 10-day course of doxycycline, administered twice daily, for an ear infection. Despite this treatment, her ear condition did not improve, It was discovered that her ear was still filled with fluid, leading to a 5-day course of prednisone. She reports persistent soreness in her ear .She also mentions an episode of waking up with blood in her ear, the source of which remains unidentified. She was informed that her ear canal was swollen, impeding drainage and causing lymph node swelling.    She also reports swollen lymph nodes, which she was informed would take some time to resolve. She believes the lymph node swelling is related to her ear condition. She has observed a reduction in the size of her lymph nodes compared to their previous state.                 Review of Systems -  General ROS: negative for - chills, fatigue, fever or weight loss  ENT ROS: negative for - headaches, oral lesions or sore throat .  Cardiovascular ROS: no chest pain , orthopnea or pnd   Gastrointestinal ROS: no abdominal pain, change in bowel habits, or black or bloody stools  Skin - no rash   Neuro - no blurry vision , no loc . No focal weakness   msk - no jt tenderness or swelling    Vascular - no claudication , rest completed

## 2025-02-10 ENCOUNTER — HOSPITAL ENCOUNTER (OUTPATIENT)
Dept: CT IMAGING | Age: 75
Discharge: HOME | End: 2025-02-12
Attending: INTERNAL MEDICINE
Payer: MEDICARE

## 2025-02-10 DIAGNOSIS — R59.0 ENLARGED LYMPH NODE IN NECK: ICD-10-CM

## 2025-02-10 DIAGNOSIS — H65.92 LEFT SEROUS OTITIS MEDIA, UNSPECIFIED CHRONICITY: ICD-10-CM

## 2025-02-10 PROCEDURE — 70490 CT SOFT TISSUE NECK W/O DYE: CPT

## 2025-02-11 ENCOUNTER — TELEPHONE (OUTPATIENT)
Dept: PRIMARY CARE CLINIC | Age: 75
End: 2025-02-11

## 2025-02-11 DIAGNOSIS — E78.00 ELEVATED LDL CHOLESTEROL LEVEL: ICD-10-CM

## 2025-02-11 NOTE — TELEPHONE ENCOUNTER
[facility] in [place] states that patient would like a 90 day supply of rosuvastatin     Last Visit Date: 1/16/2025   Next Visit Date: 2/13/2025   Pharmacy:       [facility] [address] [place] [address] [address]  [place] [phone] - F [phone] [address]  [facility] [address]  Phone: [phone] Fax: [phone]

## 2025-02-11 NOTE — TELEPHONE ENCOUNTER
Last Visit Date: 1/16/2025   Next Visit Date: 2/13/2025   Ph[facility]   Pt called reporting she has had diarrhea since Friday, pt reports she has not taken OTC medication for her symptoms stating the last time she took Imodium she became very constipated.     Please advise

## 2025-02-12 RX ORDER — ROSUVASTATIN CALCIUM 5 MG/1
5 TABLET, COATED ORAL NIGHTLY
Qty: 90 TABLET | Refills: 1 | Status: SHIPPED | OUTPATIENT
Start: 2025-02-12

## 2025-02-12 NOTE — TELEPHONE ENCOUNTER
Likely viral, increase fluids and electrolytes. If it does not improve by 7-10 days, recommend appt.

## 2025-02-13 ENCOUNTER — OFFICE VISIT (OUTPATIENT)
Dept: PRIMARY CARE CLINIC | Age: 75
End: 2025-02-13
Payer: MEDICARE

## 2025-02-13 VITALS
WEIGHT: 140.6 LBS | HEART RATE: 61 BPM | SYSTOLIC BLOOD PRESSURE: 120 MMHG | RESPIRATION RATE: 16 BRPM | DIASTOLIC BLOOD PRESSURE: 78 MMHG | BODY MASS INDEX: 26.57 KG/M2

## 2025-02-13 DIAGNOSIS — J32.9 CHRONIC CONGESTION OF PARANASAL SINUS: Primary | ICD-10-CM

## 2025-02-13 DIAGNOSIS — R53.83 FATIGUE, UNSPECIFIED TYPE: ICD-10-CM

## 2025-02-13 DIAGNOSIS — A08.4 VIRAL DIARRHEA: ICD-10-CM

## 2025-02-13 DIAGNOSIS — R59.0 CERVICAL LYMPHADENOPATHY: ICD-10-CM

## 2025-02-13 PROCEDURE — 99214 OFFICE O/P EST MOD 30 MIN: CPT | Performed by: NURSE PRACTITIONER

## 2025-02-13 PROCEDURE — 1159F MED LIST DOCD IN RCRD: CPT | Performed by: NURSE PRACTITIONER

## 2025-02-13 PROCEDURE — 1123F ACP DISCUSS/DSCN MKR DOCD: CPT | Performed by: NURSE PRACTITIONER

## 2025-02-13 RX ORDER — FLUTICASONE PROPIONATE 50 MCG
1 SPRAY, SUSPENSION (ML) NASAL DAILY
Qty: 16 G | Refills: 2 | Status: SHIPPED | OUTPATIENT
Start: 2025-02-13

## 2025-02-13 ASSESSMENT — ENCOUNTER SYMPTOMS
COLOR CHANGE: 0
SHORTNESS OF BREATH: 0
CHEST TIGHTNESS: 0
RHINORRHEA: 0
ABDOMINAL PAIN: 0
NAUSEA: 0
DIARRHEA: 0
SORE THROAT: 0
VOMITING: 0

## 2025-02-13 ASSESSMENT — PATIENT HEALTH QUESTIONNAIRE - PHQ9
8. MOVING OR SPEAKING SO SLOWLY THAT OTHER PEOPLE COULD HAVE NOTICED. OR THE OPPOSITE, BEING SO FIGETY OR RESTLESS THAT YOU HAVE BEEN MOVING AROUND A LOT MORE THAN USUAL: NOT AT ALL
SUM OF ALL RESPONSES TO PHQ QUESTIONS 1-9: 1
SUM OF ALL RESPONSES TO PHQ QUESTIONS 1-9: 1
5. POOR APPETITE OR OVEREATING: NOT AT ALL
SUM OF ALL RESPONSES TO PHQ9 QUESTIONS 1 & 2: 0
9. THOUGHTS THAT YOU WOULD BE BETTER OFF DEAD, OR OF HURTING YOURSELF: NOT AT ALL
10. IF YOU CHECKED OFF ANY PROBLEMS, HOW DIFFICULT HAVE THESE PROBLEMS MADE IT FOR YOU TO DO YOUR WORK, TAKE CARE OF THINGS AT HOME, OR GET ALONG WITH OTHER PEOPLE: NOT DIFFICULT AT ALL
3. TROUBLE FALLING OR STAYING ASLEEP: NOT AT ALL
6. FEELING BAD ABOUT YOURSELF - OR THAT YOU ARE A FAILURE OR HAVE LET YOURSELF OR YOUR FAMILY DOWN: NOT AT ALL
1. LITTLE INTEREST OR PLEASURE IN DOING THINGS: NOT AT ALL
2. FEELING DOWN, DEPRESSED OR HOPELESS: NOT AT ALL
4. FEELING TIRED OR HAVING LITTLE ENERGY: SEVERAL DAYS
SUM OF ALL RESPONSES TO PHQ QUESTIONS 1-9: 1
SUM OF ALL RESPONSES TO PHQ QUESTIONS 1-9: 1
7. TROUBLE CONCENTRATING ON THINGS, SUCH AS READING THE NEWSPAPER OR WATCHING TELEVISION: NOT AT ALL

## 2025-02-13 NOTE — PROGRESS NOTES
Effort: Pulmonary effort is normal.      Breath sounds: Normal breath sounds.   Abdominal:      General: There is no distension.   Musculoskeletal:         General: Normal range of motion.      Cervical back: Neck supple.      Right lower leg: No edema.      Left lower leg: No edema.   Lymphadenopathy:      Cervical: No cervical adenopathy.   Skin:     General: Skin is warm and dry.      Capillary Refill: Capillary refill takes less than 2 seconds.   Neurological:      General: No focal deficit present.      Mental Status: She is alert and oriented to person, place, and time.   Psychiatric:         Mood and Affect: Mood normal.         Behavior: Behavior normal.           :       Diagnosis Orders   1. Chronic congestion of paranasal sinus  fluticasone (FLONASE) 50 MCG/ACT nasal spray      2. Cervical lymphadenopathy        3. Viral diarrhea        4. Fatigue, unspecified type                  :   Assessment & Plan     Assessment & Plan  1. Chronic sinus congestion.  The chronic sinus congestion may be due to improper drainage of the eustachian tube, leading to fluid accumulation. A referral to an ENT specialist has been made for further evaluation. She will continue her current regimen of Claritin, supplemented with Flonase nasal spray. The addition of a humidifier in her bedroom has been recommended to maintain optimal air moisture levels and facilitate mucus drainage.    2. Left ear infection.  The previously swollen lymph nodes have subsided, likely due to a reactive response to the left-sided ear infection. She was previously treated with antibiotics for this condition.    3. Diarrhea.  The recent episode of diarrhea, which lasted for five days, has resolved. It is suspected to have been caused by a viral infection, possibly norovirus. She was advised to maintain adequate hydration during the episode.    4. Fatigue.  Her thyroid function has been slightly abnormal, which could be contributing to her

## 2025-02-14 ENCOUNTER — TELEPHONE (OUTPATIENT)
Dept: PRIMARY CARE CLINIC | Age: 75
End: 2025-02-14

## 2025-02-14 DIAGNOSIS — J32.9 CHRONIC CONGESTION OF PARANASAL SINUS: Primary | ICD-10-CM

## 2025-02-14 NOTE — TELEPHONE ENCOUNTER
Patient called and requested a new referral to Lisy Espinosa MD    [id number], [place] [id number] [phone]

## 2025-03-10 DIAGNOSIS — F32.A MILD DEPRESSION: ICD-10-CM

## 2025-03-10 RX ORDER — NORTRIPTYLINE HYDROCHLORIDE 25 MG/1
25 CAPSULE ORAL NIGHTLY
Qty: 30 CAPSULE | Refills: 2 | Status: SHIPPED | OUTPATIENT
Start: 2025-03-10

## 2025-04-27 DIAGNOSIS — E55.9 VITAMIN D DEFICIENCY: ICD-10-CM

## 2025-04-28 RX ORDER — ERGOCALCIFEROL 1.25 MG/1
50000 CAPSULE, LIQUID FILLED ORAL WEEKLY
Qty: 12 CAPSULE | Refills: 1 | Status: SHIPPED | OUTPATIENT
Start: 2025-04-28

## 2025-05-22 ENCOUNTER — OFFICE VISIT (OUTPATIENT)
Age: 75
End: 2025-05-22
Payer: MEDICARE

## 2025-05-22 DIAGNOSIS — S42.401A CLOSED FRACTURE OF DISTAL END OF RIGHT HUMERUS, UNSPECIFIED FRACTURE MORPHOLOGY, INITIAL ENCOUNTER: Primary | ICD-10-CM

## 2025-05-22 PROCEDURE — 1123F ACP DISCUSS/DSCN MKR DOCD: CPT | Performed by: NURSE PRACTITIONER

## 2025-05-22 PROCEDURE — 99204 OFFICE O/P NEW MOD 45 MIN: CPT | Performed by: NURSE PRACTITIONER

## 2025-05-22 NOTE — PROGRESS NOTES
Sling on at all times except for hygiene and gentle motion. Ice, elevate, NSAIDs/Tylenol for pain and swelling. Plan to re-evaluate in 2 weeks with repeat xrays.          Past History:    Current Outpatient Medications:     vitamin D (ERGOCALCIFEROL) 1.25 MG (97483 UT) CAPS capsule, Take 1 capsule by mouth once a week, Disp: 12 capsule, Rfl: 1    nortriptyline (PAMELOR) 25 MG capsule, TAKE 1 CAPSULE BY MOUTH NIGHTLY, Disp: 30 capsule, Rfl: 2    fluticasone (FLONASE) 50 MCG/ACT nasal spray, 1 spray by Each Nostril route daily, Disp: 16 g, Rfl: 2    rosuvastatin (CRESTOR) 5 MG tablet, Take 1 tablet by mouth nightly, Disp: 90 tablet, Rfl: 1    tiotropium (SPIRIVA HANDIHALER) 18 MCG inhalation capsule, Inhale 1 capsule into the lungs daily, Disp: 30 capsule, Rfl: 11    Respiratory Therapy Supplies (NEBULIZER/TUBING/MOUTHPIECE) KIT, 1 kit by Does not apply route daily as needed (wheezing, SOB), Disp: 1 kit, Rfl: 0    albuterol (PROVENTIL) (2.5 MG/3ML) 0.083% nebulizer solution, Take 3 mLs by nebulization every 6 hours as needed for Wheezing, Disp: 120 each, Rfl: 3    levothyroxine (SYNTHROID) 100 MCG tablet, Take 1 tablet by mouth Daily, Disp: , Rfl:     loratadine (CLARITIN) 10 MG tablet, Take 1 tablet by mouth once daily (Patient taking differently: Take 1 tablet by mouth daily Indications: PRN), Disp: 30 tablet, Rfl: 3    albuterol sulfate HFA (PROAIR HFA) 108 (90 Base) MCG/ACT inhaler, Inhale 2 puffs into the lungs every 6 hours as needed for Wheezing, Disp: 1 each, Rfl: 11    Handicap Placard MISC, by Does not apply route Expires 5 years, Disp: 1 each, Rfl: 0  Allergies   Allergen Reactions    Benactyzine     Benadryl [Diphenhydramine]     Ciprofloxacin     Codeine     Dilantin  [Phenytoin Sodium Extended]     Dye [Iodides]     Lorazepam     Prochlorperazine Edisylate     Sulfa Antibiotics     Sulfamethoxazole-Trimethoprim     Zofran [Ondansetron Hcl] Nausea And Vomiting     Social History     Socioeconomic History

## 2025-05-28 DIAGNOSIS — S42.401A CLOSED FRACTURE OF DISTAL END OF RIGHT HUMERUS, UNSPECIFIED FRACTURE MORPHOLOGY, INITIAL ENCOUNTER: Primary | ICD-10-CM

## 2025-05-30 ENCOUNTER — TELEPHONE (OUTPATIENT)
Age: 75
End: 2025-05-30

## 2025-05-30 NOTE — TELEPHONE ENCOUNTER
This message is in regards to your appointment at our office, [facility]. Our x-ray machine is temporarily out of service in office. Your provider would like you to obtain an x-ray prior to your appointment - We will place an order in the [employer] system for you to get this done. You can go to any [facility] outpatient facility, as the x-ray order is now in place in your chart. If you choose to wait until the day of your appointment, we ask that you go to: [address] [place] [address] an hour prior to appointment time at [facility], our X-ray tech is there in this office to facilitate your x-ray appointment until our machine is done being repaired. If you have any questions, please give the office a call at [phone]. Thank you for your understanding.

## 2025-06-05 ENCOUNTER — OFFICE VISIT (OUTPATIENT)
Age: 75
End: 2025-06-05
Payer: MEDICARE

## 2025-06-05 ENCOUNTER — HOSPITAL ENCOUNTER (OUTPATIENT)
Age: 75
Discharge: HOME | End: 2025-06-07
Payer: MEDICARE

## 2025-06-05 VITALS — HEIGHT: 61 IN | WEIGHT: 140 LBS | BODY MASS INDEX: 26.43 KG/M2

## 2025-06-05 DIAGNOSIS — S42.401A CLOSED FRACTURE OF DISTAL END OF RIGHT HUMERUS, UNSPECIFIED FRACTURE MORPHOLOGY, INITIAL ENCOUNTER: Primary | ICD-10-CM

## 2025-06-05 PROCEDURE — 1123F ACP DISCUSS/DSCN MKR DOCD: CPT | Performed by: NURSE PRACTITIONER

## 2025-06-05 PROCEDURE — 1126F AMNT PAIN NOTED NONE PRSNT: CPT | Performed by: NURSE PRACTITIONER

## 2025-06-05 PROCEDURE — 73080 X-RAY EXAM OF ELBOW: CPT

## 2025-06-05 PROCEDURE — 99213 OFFICE O/P EST LOW 20 MIN: CPT | Performed by: NURSE PRACTITIONER

## 2025-06-06 DIAGNOSIS — F32.A MILD DEPRESSION: ICD-10-CM

## 2025-06-06 RX ORDER — NORTRIPTYLINE HYDROCHLORIDE 25 MG/1
25 CAPSULE ORAL NIGHTLY
Qty: 30 CAPSULE | Refills: 2 | Status: SHIPPED | OUTPATIENT
Start: 2025-06-06

## 2025-06-06 NOTE — PROGRESS NOTES
[facility]      [facility]  [address] [place] [address] [address]  [place] [address]  Dept: [phone]  Dept Fax: [phone]    Chief Compliant:  Chief Complaint   Patient presents with    Elbow Pain     R Humerus Fx         History of Present Illness:  6/6/25:This is a pleasant 75 y.o. female who is here for follow up of her right distal humerus fracture. This occurred two weeks ago. She has been intermittently wearing a sling and trying to bend the right elbow. States her pain is tolerable unless she bumps her elbow on something. She states she is just taking Tylenol as needed for pain. No new injuries or falls.     Physical Exam: Right elbow: Skin intact. Swelling and ecchymosis improved from previous exam. She does have TTP about the distal humerus. Approximately 40-85 degrees of range of motion, extension ROM is near baseline per patient.     Imaging: Independently reviewed xrays of the right elbow obtained today at outside facility which re-demonstrates a minimally displaced supracondylar distal humerus fracture.       Assessment and Plan:    This is a pleasant 75 y.o. female who has a right distal humerus fracture. Reviewed imaging with the patient. Recommend continuing non-op management. Sling as needed for comfort. Range of motion of the elbow as tolerated but no lifting/pulling/pushing more than 5 lbs with the right arm. Plan to follow up in 4 weeks with repeat xrays of the right elbow.          Past History:    Current Outpatient Medications:     vitamin D (ERGOCALCIFEROL) 1.25 MG (42590 UT) CAPS capsule, Take 1 capsule by mouth once a week, Disp: 12 capsule, Rfl: 1    nortriptyline (PAMELOR) 25 MG capsule, TAKE 1 CAPSULE BY MOUTH NIGHTLY, Disp: 30 capsule, Rfl: 2    fluticasone (FLONASE) 50 MCG/ACT nasal spray, 1 spray by Each Nostril route daily, Disp: 16 g, Rfl: 2    rosuvastatin (CRESTOR) 5

## 2025-06-06 NOTE — TELEPHONE ENCOUNTER
Last OV:  2/13/2025    Next OV: 6/13/2025    Pharmacy:   [facility] [address] [place] [phone] - F [phone]  [address]  [facility] [address]  Phone: [phone] Fax: [phone]         Confirmed? Yes

## 2025-06-23 DIAGNOSIS — S42.401A CLOSED FRACTURE OF DISTAL END OF RIGHT HUMERUS, UNSPECIFIED FRACTURE MORPHOLOGY, INITIAL ENCOUNTER: ICD-10-CM

## 2025-06-30 ENCOUNTER — TELEPHONE (OUTPATIENT)
Age: 75
End: 2025-06-30

## 2025-06-30 NOTE — TELEPHONE ENCOUNTER
Left message for Pt stating XRs are needed for their next scheduled appt. [id number] (x-ray location) and our office number for any questions ([phone])

## 2025-07-03 ENCOUNTER — OFFICE VISIT (OUTPATIENT)
Age: 75
End: 2025-07-03
Payer: MEDICARE

## 2025-07-03 ENCOUNTER — HOSPITAL ENCOUNTER (OUTPATIENT)
Age: 75
Discharge: HOME | End: 2025-07-05
Payer: MEDICARE

## 2025-07-03 VITALS — BODY MASS INDEX: 26.43 KG/M2 | WEIGHT: 140 LBS | HEIGHT: 61 IN

## 2025-07-03 DIAGNOSIS — S42.401A CLOSED FRACTURE OF DISTAL END OF RIGHT HUMERUS, UNSPECIFIED FRACTURE MORPHOLOGY, INITIAL ENCOUNTER: Primary | ICD-10-CM

## 2025-07-03 PROCEDURE — 1123F ACP DISCUSS/DSCN MKR DOCD: CPT | Performed by: NURSE PRACTITIONER

## 2025-07-03 PROCEDURE — 1126F AMNT PAIN NOTED NONE PRSNT: CPT | Performed by: NURSE PRACTITIONER

## 2025-07-03 PROCEDURE — 1159F MED LIST DOCD IN RCRD: CPT | Performed by: NURSE PRACTITIONER

## 2025-07-03 PROCEDURE — 73080 X-RAY EXAM OF ELBOW: CPT

## 2025-07-03 PROCEDURE — 99213 OFFICE O/P EST LOW 20 MIN: CPT | Performed by: NURSE PRACTITIONER

## 2025-07-03 NOTE — PROGRESS NOTES
50 MCG/ACT nasal spray, 1 spray by Each Nostril route daily, Disp: 16 g, Rfl: 2    rosuvastatin (CRESTOR) 5 MG tablet, Take 1 tablet by mouth nightly, Disp: 90 tablet, Rfl: 1    tiotropium (SPIRIVA HANDIHALER) 18 MCG inhalation capsule, Inhale 1 capsule into the lungs daily, Disp: 30 capsule, Rfl: 11    Respiratory Therapy Supplies (NEBULIZER/TUBING/MOUTHPIECE) KIT, 1 kit by Does not apply route daily as needed (wheezing, SOB), Disp: 1 kit, Rfl: 0    albuterol (PROVENTIL) (2.5 MG/3ML) 0.083% nebulizer solution, Take 3 mLs by nebulization every 6 hours as needed for Wheezing, Disp: 120 each, Rfl: 3    levothyroxine (SYNTHROID) 100 MCG tablet, Take 1 tablet by mouth Daily, Disp: , Rfl:     loratadine (CLARITIN) 10 MG tablet, Take 1 tablet by mouth once daily (Patient taking differently: Take 1 tablet by mouth daily Indications: PRN), Disp: 30 tablet, Rfl: 3    albuterol sulfate HFA (PROAIR HFA) 108 (90 Base) MCG/ACT inhaler, Inhale 2 puffs into the lungs every 6 hours as needed for Wheezing, Disp: 1 each, Rfl: 11    Handicap Placard MISC, by Does not apply route Expires 5 years, Disp: 1 each, Rfl: 0  Allergies   Allergen Reactions    Benactyzine     Benadryl [Diphenhydramine]     Ciprofloxacin     Codeine     Dilantin  [Phenytoin Sodium Extended]     Dye [Iodides]     Lorazepam     Prochlorperazine Edisylate     Sulfa Antibiotics     Sulfamethoxazole-Trimethoprim     Zofran [Ondansetron Hcl] Nausea And Vomiting     Social History     Socioeconomic History    Marital status:      Spouse name: Not on file    Number of children: Not on file    Years of education: Not on file    Highest education level: Not on file   Occupational History    Not on file   Tobacco Use    Smoking status: Former     Current packs/day: 0.00     Average packs/day: 0.5 packs/day for 23.0 years (11.5 ttl pk-yrs)     Types: Cigarettes     Start date: 10/7/1972     Quit date: 10/7/1995     Years since quittin.7    Smokeless tobacco:

## 2025-09-02 ENCOUNTER — OFFICE VISIT (OUTPATIENT)
Dept: PULMONOLOGY | Age: 75
End: 2025-09-02
Payer: MEDICARE

## 2025-09-02 VITALS
OXYGEN SATURATION: 99 % | RESPIRATION RATE: 18 BRPM | DIASTOLIC BLOOD PRESSURE: 72 MMHG | HEART RATE: 97 BPM | WEIGHT: 145 LBS | SYSTOLIC BLOOD PRESSURE: 106 MMHG | HEIGHT: 61 IN | BODY MASS INDEX: 27.38 KG/M2

## 2025-09-02 DIAGNOSIS — F32.A MILD DEPRESSION: ICD-10-CM

## 2025-09-02 DIAGNOSIS — Z90.2 H/O PNEUMONECTOMY: ICD-10-CM

## 2025-09-02 DIAGNOSIS — C34.91 MALIGNANT NEOPLASM OF RIGHT LUNG, UNSPECIFIED PART OF LUNG (HCC): ICD-10-CM

## 2025-09-02 DIAGNOSIS — J44.9 CHRONIC OBSTRUCTIVE PULMONARY DISEASE, UNSPECIFIED COPD TYPE (HCC): Primary | ICD-10-CM

## 2025-09-02 DIAGNOSIS — Z98.890 H/O PNEUMONECTOMY: ICD-10-CM

## 2025-09-02 PROCEDURE — 1123F ACP DISCUSS/DSCN MKR DOCD: CPT | Performed by: INTERNAL MEDICINE

## 2025-09-02 PROCEDURE — 1159F MED LIST DOCD IN RCRD: CPT | Performed by: INTERNAL MEDICINE

## 2025-09-02 PROCEDURE — 99213 OFFICE O/P EST LOW 20 MIN: CPT | Performed by: INTERNAL MEDICINE

## 2025-09-02 RX ORDER — TRAMADOL HYDROCHLORIDE 25 MG/1
TABLET, COATED ORAL
COMMUNITY
Start: 2025-05-19

## 2025-09-02 RX ORDER — NORTRIPTYLINE HYDROCHLORIDE 25 MG/1
25 CAPSULE ORAL NIGHTLY
Qty: 30 CAPSULE | Refills: 2 | Status: SHIPPED | OUTPATIENT
Start: 2025-09-02

## 2025-09-03 DIAGNOSIS — E78.00 ELEVATED LDL CHOLESTEROL LEVEL: ICD-10-CM

## 2025-09-03 RX ORDER — ROSUVASTATIN CALCIUM 5 MG/1
5 TABLET, COATED ORAL NIGHTLY
Qty: 90 TABLET | Refills: 1 | Status: SHIPPED | OUTPATIENT
Start: 2025-09-03

## 2025-09-03 RX ORDER — LEVOTHYROXINE SODIUM 100 UG/1
100 TABLET ORAL DAILY
Qty: 30 TABLET | Refills: 0 | Status: SHIPPED | OUTPATIENT
Start: 2025-09-03